# Patient Record
Sex: FEMALE | Race: WHITE | NOT HISPANIC OR LATINO | Employment: OTHER | URBAN - METROPOLITAN AREA
[De-identification: names, ages, dates, MRNs, and addresses within clinical notes are randomized per-mention and may not be internally consistent; named-entity substitution may affect disease eponyms.]

---

## 2017-04-06 ENCOUNTER — OFFICE VISIT (OUTPATIENT)
Dept: OBSTETRICS AND GYNECOLOGY | Age: 62
End: 2017-04-06

## 2017-04-06 VITALS
DIASTOLIC BLOOD PRESSURE: 80 MMHG | WEIGHT: 161 LBS | HEIGHT: 68 IN | BODY MASS INDEX: 24.4 KG/M2 | SYSTOLIC BLOOD PRESSURE: 118 MMHG

## 2017-04-06 DIAGNOSIS — R35.0 URINE FREQUENCY: ICD-10-CM

## 2017-04-06 DIAGNOSIS — N76.2 ACUTE VULVITIS: ICD-10-CM

## 2017-04-06 DIAGNOSIS — Z11.51 SCREENING FOR HUMAN PAPILLOMAVIRUS: ICD-10-CM

## 2017-04-06 DIAGNOSIS — Z01.419 WELL WOMAN EXAM WITH ROUTINE GYNECOLOGICAL EXAM: Primary | ICD-10-CM

## 2017-04-06 DIAGNOSIS — Z78.0 MENOPAUSE: ICD-10-CM

## 2017-04-06 DIAGNOSIS — Z79.890 POST-MENOPAUSE ON HRT (HORMONE REPLACEMENT THERAPY): ICD-10-CM

## 2017-04-06 PROBLEM — I10 HYPERTENSION: Status: ACTIVE | Noted: 2017-04-06

## 2017-04-06 PROBLEM — I42.9 CARDIOMYOPATHY (HCC): Status: ACTIVE | Noted: 2017-04-06

## 2017-04-06 LAB
BILIRUB BLD-MCNC: ABNORMAL MG/DL
GLUCOSE UR STRIP-MCNC: NEGATIVE MG/DL
KETONES UR QL: NEGATIVE
LEUKOCYTE EST, POC: NEGATIVE
NITRITE UR-MCNC: NEGATIVE MG/ML
PH UR: 5 [PH] (ref 5–8)
PROT UR STRIP-MCNC: NEGATIVE MG/DL
RBC # UR STRIP: NEGATIVE /UL
SP GR UR: 1.02 (ref 1–1.03)
UROBILINOGEN UR QL: NORMAL

## 2017-04-06 PROCEDURE — 81003 URINALYSIS AUTO W/O SCOPE: CPT | Performed by: OBSTETRICS & GYNECOLOGY

## 2017-04-06 PROCEDURE — 99396 PREV VISIT EST AGE 40-64: CPT | Performed by: OBSTETRICS & GYNECOLOGY

## 2017-04-06 RX ORDER — LIOTHYRONINE SODIUM 5 UG/1
TABLET ORAL
Refills: 1 | COMMUNITY
Start: 2017-03-15

## 2017-04-06 RX ORDER — ALBUTEROL SULFATE 90 UG/1
2 AEROSOL, METERED RESPIRATORY (INHALATION) EVERY 4 HOURS PRN
COMMUNITY
Start: 2016-10-07 | End: 2019-12-30

## 2017-04-06 RX ORDER — ATORVASTATIN CALCIUM 10 MG/1
TABLET, FILM COATED ORAL
Refills: 3 | COMMUNITY
Start: 2017-03-15 | End: 2019-01-11

## 2017-04-06 RX ORDER — HYDROCODONE BITARTRATE AND ACETAMINOPHEN 7.5; 325 MG/1; MG/1
1 TABLET ORAL EVERY 6 HOURS
COMMUNITY
End: 2017-04-06 | Stop reason: SDUPTHER

## 2017-04-06 RX ORDER — IRBESARTAN 300 MG/1
TABLET ORAL
Refills: 3 | COMMUNITY
Start: 2017-01-06 | End: 2017-04-06 | Stop reason: DRUGHIGH
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
## 2017-04-09 RX ORDER — ESTRADIOL 0.07 MG/D
1 FILM, EXTENDED RELEASE TRANSDERMAL 2 TIMES WEEKLY
Qty: 8 PATCH | Refills: 11 | Status: SHIPPED | OUTPATIENT
Start: 2017-04-10 | End: 2017-09-28 | Stop reason: DRUGHIGH

## 2017-04-10 NOTE — PROGRESS NOTES
Notify Allyn that her urinalysis is small amount of bilirubin, this normal should be negative.  With her alcohol abuse history we need to check her liver enzymes.  She should come in for a CBC and CMP.

## 2017-04-11 DIAGNOSIS — F10.10 ALCOHOL ABUSE: ICD-10-CM

## 2017-04-11 DIAGNOSIS — R17 ELEVATED BILIRUBIN: Primary | ICD-10-CM

## 2017-04-12 LAB
CYTOLOGIST CVX/VAG CYTO: NORMAL
CYTOLOGY CVX/VAG DOC THIN PREP: NORMAL
DX ICD CODE: NORMAL
HIV 1 & 2 AB SER-IMP: NORMAL
HPV I/H RISK 1 DNA CVX QL PROBE+SIG AMP: NEGATIVE
OTHER STN SPEC: NORMAL
PATH REPORT.FINAL DX SPEC: NORMAL
STAT OF ADQ CVX/VAG CYTO-IMP: NORMAL

## 2017-04-13 LAB
A VAGINAE DNA VAG QL NAA+PROBE: NORMAL SCORE
BVAB2 DNA VAG QL NAA+PROBE: NORMAL SCORE
C ALBICANS DNA VAG QL NAA+PROBE: NEGATIVE
C GLABRATA DNA VAG QL NAA+PROBE: NEGATIVE
MEGA1 DNA VAG QL NAA+PROBE: NORMAL SCORE
T VAGINALIS RRNA SPEC QL NAA+PROBE: NEGATIVE

## 2017-04-25 ENCOUNTER — TELEPHONE (OUTPATIENT)
Dept: OBSTETRICS AND GYNECOLOGY | Age: 62
End: 2017-04-25

## 2017-05-01 ENCOUNTER — RESULTS ENCOUNTER (OUTPATIENT)
Dept: OBSTETRICS AND GYNECOLOGY | Age: 62
End: 2017-05-01

## 2017-05-01 DIAGNOSIS — F10.10 ALCOHOL ABUSE: ICD-10-CM

## 2017-05-01 DIAGNOSIS — R17 ELEVATED BILIRUBIN: ICD-10-CM

## 2017-05-08 ENCOUNTER — TELEPHONE (OUTPATIENT)
Dept: OBSTETRICS AND GYNECOLOGY | Age: 62
End: 2017-05-08

## 2017-09-28 RX ORDER — ESTRADIOL 0.1 MG/D
FILM, EXTENDED RELEASE TRANSDERMAL
Qty: 8 PATCH | Refills: 10 | Status: SHIPPED | OUTPATIENT
Start: 2017-09-28 | End: 2019-01-11 | Stop reason: DRUGHIGH

## 2018-04-09 DIAGNOSIS — Z79.890 POST-MENOPAUSE ON HRT (HORMONE REPLACEMENT THERAPY): ICD-10-CM

## 2018-05-25 DIAGNOSIS — Z79.890 POST-MENOPAUSE ON HRT (HORMONE REPLACEMENT THERAPY): ICD-10-CM

## 2018-07-09 DIAGNOSIS — Z79.890 POST-MENOPAUSE ON HRT (HORMONE REPLACEMENT THERAPY): ICD-10-CM

## 2018-08-13 DIAGNOSIS — Z79.890 POST-MENOPAUSE ON HRT (HORMONE REPLACEMENT THERAPY): ICD-10-CM

## 2018-09-27 DIAGNOSIS — Z79.890 POST-MENOPAUSE ON HRT (HORMONE REPLACEMENT THERAPY): ICD-10-CM

## 2018-11-05 DIAGNOSIS — Z79.890 POST-MENOPAUSE ON HRT (HORMONE REPLACEMENT THERAPY): ICD-10-CM

## 2018-11-05 RX ORDER — ESTRADIOL 0.1 MG/D
FILM, EXTENDED RELEASE TRANSDERMAL
Qty: 8 PATCH | Refills: 0 | OUTPATIENT
Start: 2018-11-05

## 2018-11-05 NOTE — TELEPHONE ENCOUNTER
I will not refill these medications until patient is seen November 7.  She was due for an annual in April and has canceled multiple times.

## 2018-11-09 ENCOUNTER — TELEPHONE (OUTPATIENT)
Dept: OBSTETRICS AND GYNECOLOGY | Age: 63
End: 2018-11-09

## 2018-11-09 NOTE — TELEPHONE ENCOUNTER
I left Matilde message that she can go cold turkey off HRT.  She'll decide hot flashes and night sweats like crazy.  We can look to see how soon we can get her in at the end of the day.

## 2018-11-09 NOTE — TELEPHONE ENCOUNTER
Pt said she called her pharmacy to get a refill on her HRT and it was denied,  Can pt just go cold turkey until her next appt in a month? Or can we call in one more refill?    Pt has cancelled multiple appts (please see her appt history)

## 2018-12-11 ENCOUNTER — TELEPHONE (OUTPATIENT)
Dept: OBSTETRICS AND GYNECOLOGY | Age: 63
End: 2018-12-11

## 2019-01-11 ENCOUNTER — OFFICE VISIT (OUTPATIENT)
Dept: OBSTETRICS AND GYNECOLOGY | Age: 64
End: 2019-01-11

## 2019-01-11 VITALS
WEIGHT: 161 LBS | HEIGHT: 68 IN | BODY MASS INDEX: 24.4 KG/M2 | DIASTOLIC BLOOD PRESSURE: 72 MMHG | SYSTOLIC BLOOD PRESSURE: 112 MMHG

## 2019-01-11 DIAGNOSIS — N32.81 OAB (OVERACTIVE BLADDER): ICD-10-CM

## 2019-01-11 DIAGNOSIS — Z79.890 POST-MENOPAUSE ON HRT (HORMONE REPLACEMENT THERAPY): Primary | ICD-10-CM

## 2019-01-11 DIAGNOSIS — Z01.419 WELL WOMAN EXAM WITH ROUTINE GYNECOLOGICAL EXAM: ICD-10-CM

## 2019-01-11 DIAGNOSIS — N95.2 VAGINAL ATROPHY: ICD-10-CM

## 2019-01-11 DIAGNOSIS — Z78.0 MENOPAUSE: ICD-10-CM

## 2019-01-11 DIAGNOSIS — Z13.89 SCREENING FOR HEMATURIA OR PROTEINURIA: ICD-10-CM

## 2019-01-11 DIAGNOSIS — Z11.51 SCREENING FOR HUMAN PAPILLOMAVIRUS: ICD-10-CM

## 2019-01-11 LAB
BILIRUB BLD-MCNC: NEGATIVE MG/DL
CLARITY, POC: CLEAR
COLOR UR: YELLOW
GLUCOSE UR STRIP-MCNC: NEGATIVE MG/DL
KETONES UR QL: NEGATIVE
LEUKOCYTE EST, POC: NEGATIVE
NITRITE UR-MCNC: NEGATIVE MG/ML
PH UR: 5 [PH] (ref 5–8)
PROT UR STRIP-MCNC: NEGATIVE MG/DL
RBC # UR STRIP: NEGATIVE /UL
SP GR UR: 1.02 (ref 1–1.03)
UROBILINOGEN UR QL: NORMAL

## 2019-01-11 PROCEDURE — 81002 URINALYSIS NONAUTO W/O SCOPE: CPT | Performed by: OBSTETRICS & GYNECOLOGY

## 2019-01-11 PROCEDURE — 99396 PREV VISIT EST AGE 40-64: CPT | Performed by: OBSTETRICS & GYNECOLOGY

## 2019-01-11 RX ORDER — TRAMADOL HYDROCHLORIDE 50 MG/1
TABLET ORAL
COMMUNITY
End: 2019-01-11

## 2019-01-11 RX ORDER — OMEPRAZOLE 40 MG/1
CAPSULE, DELAYED RELEASE ORAL
Refills: 5 | COMMUNITY
Start: 2018-11-15 | End: 2019-01-11 | Stop reason: SDUPTHER

## 2019-01-11 RX ORDER — ESTRADIOL 0.07 MG/D
1 FILM, EXTENDED RELEASE TRANSDERMAL 2 TIMES WEEKLY
Qty: 8 PATCH | Refills: 12 | Status: SHIPPED | OUTPATIENT
Start: 2019-01-14 | End: 2019-02-11 | Stop reason: SDUPTHER

## 2019-01-11 RX ORDER — OMEPRAZOLE 40 MG/1
40 CAPSULE, DELAYED RELEASE ORAL DAILY
COMMUNITY

## 2019-01-11 NOTE — PROGRESS NOTES
Routine Annual Visit    2019    Patient: Victoria H Sturgeon          MR#:[id number]    Chief Complaint   Patient presents with   • Gynecologic Exam     AE today.  2017 pap neg, neg HPV.  PM with HRT.  MG due (family hx of breast cancer).  C-scope .        63 y.o. female  who presents for annual exam.     HISTORY OF PRESENT ILLNESS:    GYN Complaints: Incontinence, not SHANTA, mostly urgency, with occ leakage of a little urine. Will change underwear twice daily, but not damp, just changes. Much better with Myrbetriq, and even better when she doubled the dosage.     =Has noticed an odor recently, since off the estrogen. Will restart the patch.    Other Complaints: None.    =Has stopped drinking altogether. Seeing a cousellor. We talked about AA. Plans to go.    GYN HISTORY:  1.  Menstrual Hx:         HRT:  yes - Estradiol 0.1 mg twice weekly and progesterone 100 mg daily.         Current contraception: post menopausal status    2.  History of abnormal Pap smear: no.  Never had cervical dysplasia.      Pap smear Hx: 3525-6952, Normal Pap smear, Positive HR-HPV.  Did not test for type 16/18.    , Neg Pap, Neg HR-HPV.   , Neg Pap, Neg HR-HPV.   , Neg Pap, Neg HR-HPV.    NEW PAST MEDICAL HISTORY:    3.  New Medical Hx:  None.  Past Medical History:   Diagnosis Date   • Abnormal LFTs (liver function tests)     Improved with stopping ETOH.   • Abnormality on patient-activated cardiac event recorder     Underlying rhytm was sinus rhythm and sinus tachycardia.  Rates varied from 72 to 139 BPM.  Pt complains of fatigue and dizziness which occurred with sinus tachycardia at rate of 107.  Conclusion: There were no atrial or ventricular ectopic beats.  The patient's average heart rate was 93, which was elevated.  I have increased her Coreg to 25mg BID.    • Acute pain of left foot    • Aphthous ulcer of tongue    • Cardiomyopathy (CMS/HCC)     Viral: Follow up Dr Eden Rodriguez   • Cervical  radiculitis 2016    ACUTE  - DR. BECKWITH    • Chest discomfort    • Chondromalacia patellae, right knee 2017    injury 2016   • Colon cancer screening    • Contusion of right knee 2017    injury 2016   • Degeneration of cervical intervertebral disc    • Degenerative joint disease 2016    Dr. Beckwith - cervical spine    • Depression 2015   • Encounter for long-term (current) use of high-risk medication    • Encounter for screening colonoscopy    • Folate deficiency    • H/O cancer antigen 125 (CA-125) measurement    • H/O Doppler ultrasound    • History of cardiomyopathy    • History of Papanicolaou smear of cervix 2017    5029-3378, Normal Pap smear. ?? Year was Positive HR-HPV.  Did not test for type 16/18.   &  & , Neg Paps and Neg HR-HPV s.   • HPV test positive    • Hyperlipidemia    • Hypertension    • Hypothyroidism    • Internal hemorrhoid    • Macrocytic anemia    • Menopause    • Neck pain    • NICM (nonischemic cardiomyopathy) (CMS/HCC)    • Normal coronary arteries    • Osteopenia    • Pharyngeal abscess    • Sinusitis    • Spinal stenosis 2016    DR. BECKWITH    • Sprain of sacroiliac joint 2017    injury 2016   • Staphylococcal infection     Of throat   • Trigeminal neuralgia    • Urge incontinence    • Vitamin B 12 deficiency            4.  New Surgical Hx:  Colonoscopy in 2018: Normal, some diverticuli, no polyps.   Past Surgical History:   Procedure Laterality Date   • CATARACT EXTRACTION Bilateral 2016   •  SECTION      Baby Girl BERNA (Freshman @ [facility] )   • CHOLECYSTECTOMY  1963   • COLONOSCOPY  2014    Diverticulosis sigmoid, internal hemorrhoids   • HERNIA REPAIR      Age 19   • KNEE ARTHROSCOPY INCISION AND DRAINAGE OF KNEE Right  &     after fall   • SINUS SURGERY      X 2 in  and in            5.  New Family Medical Hx:  None.   Family History   Problem Relation Age of Onset   •  "Lupus Mother         Systemic Lupus Erythematosus (  in her sleep @ 73)   • Leukemia Father         CLL Had it for 14 years.   • Colon polyps Other         Family History   • Colon cancer Other         Family History   • Other Sister         breast lumpectomy   • No Known Problems Brother    • Scoliosis Daughter    • Heart disease Maternal Grandmother    • Anuerysm Maternal Grandmother 80   • No Known Problems Paternal Grandmother         90's   • Breast cancer Paternal Aunt 78   • No Known Problems Maternal Grandfather    • No Known Problems Paternal Grandfather         90's   • No Known Problems Sister    • Diabetes type II Brother         Diet controlled    • BRCA 1/2 Neg Hx    • Endometrial cancer Neg Hx    • Ovarian cancer Neg Hx          6.  SCREENINGS:  =Family history of breast cancer: yes - Pat Aunt @ 78.  Perform regular self breast exam: yes - Monthly mostly.  Breast self-examination technique  reviewed and the patient encouraged to perform self breast exams monthly.     =Family history of ovarian cancer: no  =Family history of uterine cancer: no  =Family History of colon cancer: no      Mammogram: up to date.  Colonoscopy: up to date.  DEXA: up to date.    7.  LIFESTYLE:  =Diet: Healthy.   = We discussed healthy lifestyle modifications.      =Exercise:  yes - Walking..   =I recommended 30 minutes of aerobic exercise 5 times a week.     =Calcium  no.  =Vitamin D:  no.   = We discussed calcium intake needs to help prevent osteoporosis:      Recommended 600 mg of calcium daily and 1000 IUs of vitamin D 3 daily.      Review of Systems   All other systems reviewed and are negative.      Objective     /72   Ht 172.7 cm (68\")   Wt 73 kg (161 lb)   Breastfeeding? No   BMI 24.48 kg/m²     PHYSICAL EXAM    Constitutional: Well-developed, well-nourished, thin  female, in no distress, alert and well oriented ×3.    Skin is warm, dry, without rash.       Neck appears normal, trachea in the " midline.  Thyroid exam normal. No carotid bruits bilaterally.         No cervical lymphadenopathy.    Lungs clear to auscultation.  Chest wall appears normal.    Heart with regular rhythm without murmur or gallop.    Breasts: Rec Regular SBE.        Right breast nontender, without dominant mass.  No nipple discharge.            No superficial skin changes.  No axillary adenopathy.        Left breast nontender, without dominant mass.  No nipple discharge.            No superficial skin changes.  No axillary adenopathy.      Abdomen is flat, soft and nontender.No palpable mass.           No hepatosplenomegaly.  Flanks are negative.          Bowel sounds normal.  No abdominal bruit.          No inguinal lymphadenopathy bilaterally.     Pelvic exam :  Vulva normal.           External genitalia normal.  BUS negative.             Introitus a little atrophic.  Vaginal mucosa atrophic            Cervix normal, Pap with HPV.  No cervical motion tenderness.          Uterus Midplane, normal size, normal shape, and position.          Adnexa are negative bilaterally.  No tenderness.  No palpable mass.          Rectovaginal exam Negative. .      Musc /Skel: Lower extremities without edema.     Neuro: Coordination is grossly normal.  Gait is normal.    Psych: Mood and affect is normal.  Behavior normal.  Thought content normal.            Judgment normal.       =All questions were answered.      Assessment/Plan   Matilde was seen today for gynecologic exam.    Diagnoses and all orders for this visit:    Post-menopause on HRT (hormone replacement therapy)  -     progesterone (PROMETRIUM) 100 MG capsule; Take 1 capsule by mouth Daily.  -     estradiol (MINIVELLE, VIVELLE-DOT) 0.075 MG/24HR patch; Place 1 patch on the skin as directed by provider 2 (Two) Times a Week.  -     PapIG, HPV, Rfx 16 / 18  -     Mammo Screening Bilateral With CAD; Future    Well woman exam with routine gynecological exam  -     PapIG, HPV, Rfx 16 /  18    Menopause  -     progesterone (PROMETRIUM) 100 MG capsule; Take 1 capsule by mouth Daily.  -     estradiol (MINIVELLE, VIVELLE-DOT) 0.075 MG/24HR patch; Place 1 patch on the skin as directed by provider 2 (Two) Times a Week.  -     Mammo Screening Bilateral With CAD; Future    Vaginal atrophy  -     estradiol (MINIVELLE, VIVELLE-DOT) 0.075 MG/24HR patch; Place 1 patch on the skin as directed by provider 2 (Two) Times a Week.    OAB (overactive bladder)    Screening for hematuria or proteinuria  -     POC Urinalysis Dipstick    Screening for human papillomavirus  -     PapIG, HPV, Rfx 16 / 18    Other orders  -     Mirabegron ER (MYRBETRIQ) 50 MG tablet sustained-release 24 hour 24 hr tablet; Take 50 mg by mouth Daily.        COMMENTS:Normal gyn exam. Has stopped drinking, excellent. Will restart her HRT with a decrease in her estradiol to 0.075 mg.    Fortino Garay MD  1/12/2019

## 2019-01-15 LAB
CYTOLOGIST CVX/VAG CYTO: NORMAL
CYTOLOGY CVX/VAG DOC THIN PREP: NORMAL
DX ICD CODE: NORMAL
HIV 1 & 2 AB SER-IMP: NORMAL
HPV I/H RISK 1 DNA CVX QL PROBE+SIG AMP: NEGATIVE
Lab: NORMAL
OTHER STN SPEC: NORMAL
PATH REPORT.FINAL DX SPEC: NORMAL
STAT OF ADQ CVX/VAG CYTO-IMP: NORMAL

## 2019-01-16 NOTE — PROGRESS NOTES
Notify that the Pap smear was negative and the high risk HPV testing was negative.  The Pap smear also showed changes associated with inflammation, THIS IS BECAUSE SHE HAS BEEN OFF THE ESTROGEN.  Also why she has noticed some discharge and odor.  This will resolve as she is back on the patch.

## 2019-02-11 DIAGNOSIS — Z79.890 POST-MENOPAUSE ON HRT (HORMONE REPLACEMENT THERAPY): ICD-10-CM

## 2019-02-11 DIAGNOSIS — N95.2 VAGINAL ATROPHY: ICD-10-CM

## 2019-02-11 DIAGNOSIS — Z78.0 MENOPAUSE: ICD-10-CM

## 2019-02-11 RX ORDER — ESTRADIOL 0.07 MG/D
FILM, EXTENDED RELEASE TRANSDERMAL
Qty: 8 PATCH | Refills: 0 | Status: SHIPPED | OUTPATIENT
Start: 2019-02-11 | End: 2019-05-06 | Stop reason: DRUGHIGH

## 2019-04-03 ENCOUNTER — TELEPHONE (OUTPATIENT)
Dept: OBSTETRICS AND GYNECOLOGY | Age: 64
End: 2019-04-03

## 2019-04-05 NOTE — TELEPHONE ENCOUNTER
Pt already had it done several months ago at [facility], do we need those records? She had a bone density as well.

## 2019-04-08 NOTE — TELEPHONE ENCOUNTER
I do not see these in care everywhere?  Would you please get mammogram and bone density if you don't see them in [facility]?

## 2019-05-06 ENCOUNTER — TELEPHONE (OUTPATIENT)
Dept: OBSTETRICS AND GYNECOLOGY | Age: 64
End: 2019-05-06

## 2019-05-06 DIAGNOSIS — Z79.890 POST-MENOPAUSE ON HRT (HORMONE REPLACEMENT THERAPY): Primary | ICD-10-CM

## 2019-05-06 RX ORDER — ESTRADIOL 0.1 MG/D
1 FILM, EXTENDED RELEASE TRANSDERMAL 2 TIMES WEEKLY
Qty: 24 PATCH | Refills: 4 | Status: SHIPPED | OUTPATIENT
Start: 2019-05-06 | End: 2019-07-05

## 2019-05-06 NOTE — TELEPHONE ENCOUNTER
Pt says she had discussed her estradiol patch dosage at last visit and she was going to try the lower dose. She tried the lower dose this weekend and had terrible night sweats. She ended up putting on 2 patches. She would like to increase the dosage to the next available amount. Pt requested to wait for Dr Garay to return to the office.

## 2019-05-07 NOTE — TELEPHONE ENCOUNTER
May 6, 2019.  8:19 PM  I left a message for Matilde that I E-scripted in her estradiol patch, 0.1 mg, twice weekly to her pharmacy.

## 2019-06-18 RX ORDER — CARVEDILOL 25 MG/1
25 TABLET ORAL 2 TIMES DAILY
Qty: 60 TABLET | Refills: 0 | Status: SHIPPED | OUTPATIENT
Start: 2019-06-18 | End: 2019-12-30 | Stop reason: DRUGHIGH

## 2019-07-03 DIAGNOSIS — N95.2 VAGINAL ATROPHY: ICD-10-CM

## 2019-07-03 DIAGNOSIS — Z79.890 POST-MENOPAUSE ON HRT (HORMONE REPLACEMENT THERAPY): ICD-10-CM

## 2019-07-03 DIAGNOSIS — Z78.0 MENOPAUSE: ICD-10-CM

## 2019-07-05 ENCOUNTER — TELEPHONE (OUTPATIENT)
Dept: OBSTETRICS AND GYNECOLOGY | Age: 64
End: 2019-07-05

## 2019-07-05 RX ORDER — ESTRADIOL 0.07 MG/D
FILM, EXTENDED RELEASE TRANSDERMAL
Qty: 8 PATCH | Refills: 6 | Status: SHIPPED | OUTPATIENT
Start: 2019-07-05 | End: 2019-07-19

## 2019-07-05 NOTE — TELEPHONE ENCOUNTER
Dr Phan pt, Tanisha transfer, pt seen that her Estradiol patch was called in at 0.075 MG/24HR Patch. Pt states the dosage was lowered and then increased back to 0.1MG please. Pt asked if the 0.1 MG could be sent to her [facility] on file. Please Advise

## 2019-07-05 NOTE — TELEPHONE ENCOUNTER
Per Liang at [facility], patient's  picked up the rx for the lower dose and he is not sure that insurance will cover another rx immediately.  He will run through and might be that patient has to pay out of pocket for it.  Called patient and left message to advise her to call pharmacy tomorrow to inquire as to what she can do for the new rx.  Apologized for the inconvenience.

## 2019-07-16 RX ORDER — FLUCONAZOLE 150 MG/1
150 TABLET ORAL DAILY
Qty: 2 TABLET | Refills: 0 | Status: SHIPPED | OUTPATIENT
Start: 2019-07-16 | End: 2019-07-19

## 2019-07-16 NOTE — TELEPHONE ENCOUNTER
Heather called experiencing vaginal itching  She started OTC yesterday monistat   Her symptoms began three days ago, vaginal itching and progressively worsened.  She said that the OTC,  two doses so far have offered no relief.  Would we please call in something stronger to her pharmacy on file?      PT # [phone]

## 2019-07-17 NOTE — TELEPHONE ENCOUNTER
Dr Phan pt has called in again because of all the itching and rawness. Pt states she took the 1st dose of Diflucan yesterday and the 2nd dose this morning. Pt said that it is still early for the meds to work, but wanted to see if there is another topical cream she may be able to use. Pt states this is the worst yeast infection she has ever had. Please Advise

## 2019-07-18 RX ORDER — NYSTATIN AND TRIAMCINOLONE ACETONIDE 100000; 1 [USP'U]/G; MG/G
OINTMENT TOPICAL 2 TIMES DAILY
Qty: 15 G | Refills: 0 | Status: SHIPPED | OUTPATIENT
Start: 2019-07-18 | End: 2021-01-10 | Stop reason: HOSPADM

## 2019-07-18 NOTE — TELEPHONE ENCOUNTER
May be using too many things- can actually get a chemical irritation if doing so. Can send in a different antifungal and steroid ointment to try but would recommend working in this afternoon if so miserable

## 2019-07-18 NOTE — TELEPHONE ENCOUNTER
Pt would like the medication called in.     __________      Sent in but if sx not better will need to come in    Zeynep Phan MD  7/18/2019  4:57 PM

## 2019-07-18 NOTE — TELEPHONE ENCOUNTER
Pt is calling again. Pt bought Cortizone feminine creme, Vagisil extreme along with monistat cream yesterday.

## 2019-07-19 ENCOUNTER — OFFICE VISIT (OUTPATIENT)
Dept: OBSTETRICS AND GYNECOLOGY | Age: 64
End: 2019-07-19

## 2019-07-19 VITALS
BODY MASS INDEX: 23.34 KG/M2 | SYSTOLIC BLOOD PRESSURE: 110 MMHG | HEIGHT: 68 IN | DIASTOLIC BLOOD PRESSURE: 70 MMHG | WEIGHT: 154 LBS

## 2019-07-19 DIAGNOSIS — B37.31 YEAST INFECTION INVOLVING THE VAGINA AND SURROUNDING AREA: Primary | ICD-10-CM

## 2019-07-19 PROCEDURE — 99214 OFFICE O/P EST MOD 30 MIN: CPT | Performed by: NURSE PRACTITIONER

## 2019-07-19 RX ORDER — ESTRADIOL 0.1 MG/D
FILM, EXTENDED RELEASE TRANSDERMAL
Refills: 6 | COMMUNITY
Start: 2019-07-17 | End: 2020-07-15

## 2019-07-19 RX ORDER — ZOLPIDEM TARTRATE 10 MG/1
TABLET ORAL
Refills: 3 | COMMUNITY
Start: 2019-06-24 | End: 2022-08-09

## 2019-07-19 RX ORDER — ROSUVASTATIN CALCIUM 5 MG/1
5 TABLET, COATED ORAL DAILY
Refills: 5 | COMMUNITY
Start: 2019-07-17

## 2019-07-19 RX ORDER — LIDOCAINE 50 MG/G
OINTMENT TOPICAL 2 TIMES DAILY
Qty: 30 G | Refills: 0 | Status: SHIPPED | OUTPATIENT
Start: 2019-07-19 | End: 2021-01-10 | Stop reason: HOSPADM

## 2019-07-19 NOTE — PROGRESS NOTES
"Subjective   Victoria H Sturgeon is a 64 y.o. female is being seen today for   Chief Complaint   Patient presents with   • Vaginitis     Pt c/o severe itching and has tried Diflucan (7/16/19) and several OTC medicines and nothing has helped. Pt says she is not itching much anymore but is very raw.    .    History of Present Illness     Patient here with vaginal itching that started this week  She noticed it while swimming this week, she has been in a wet bathing suit a lot   She used monistat 3 day Mon-Wed  She has had a decrease in itching but is still having some irritation   States she has scratched it a lot and caused raw areas  She has used monistat, vagisel, vaseline, neosporin, summers erick wipes and diflucan this week  She just took Diflucan 2 days ago and then again yesterday that was called in  She has not used the cream Dr Phan called in yet (Mycolog)  She is not sexually active      The following portions of the patient's history were reviewed and updated as appropriate: allergies, current medications, past family history, past medical history, past social history, past surgical history and problem list.    /70   Ht 172.7 cm (68\")   Wt 69.9 kg (154 lb)   BMI 23.42 kg/m²         Review of Systems   Constitutional: Negative.    HENT: Negative.    Eyes: Negative.    Respiratory: Negative.    Cardiovascular: Negative.    Gastrointestinal: Negative.    Endocrine: Negative.    Genitourinary: Positive for vaginal pain.        Vaginal itching   Musculoskeletal: Negative.    Skin: Negative.    Allergic/Immunologic: Negative.    Neurological: Negative.    Hematological: Negative.    Psychiatric/Behavioral: Negative.        Objective   Physical Exam   Constitutional: She is oriented to person, place, and time. She appears well-developed and well-nourished.   Genitourinary: There is rash on the right labia. There is no lesion on the right labia. There is rash on the left labia. There is no tenderness or " lesion on the left labia. No erythema in the vagina.   Genitourinary Comments: Patient requested no internal exam or speculum   External vulva red and swollen, some areas of breakdown on labia and in creases of legs, no lesions  Erythema extends to bikini line   Neurological: She is alert and oriented to person, place, and time.   Psychiatric: She has a normal mood and affect. Her behavior is normal.         Assessment/Plan   Matilde was seen today for vaginitis.    Diagnoses and all orders for this visit:    Yeast infection involving the vagina and surrounding area    Other orders  -     lidocaine (XYLOCAINE) 5 % ointment; Apply  topically to the appropriate area as directed 2 (Two) Times a Day.        Symptoms and exam consistent with external yeast.  She has not used the external cream that was called in to the pharmacy yesterday so I suggested she start that and use it twice a day.  Keep the area clean and dry and discontinue all other products and OTC treatments to avoid further irritation. Patient requests lidocaine cream as well to help avoid further damage by itching.  She will call back next week if her symptoms are not improved or worsen and may need to return for further evaluation.

## 2019-07-22 LAB
C ALBICANS DNA VAG QL NAA+PROBE: NEGATIVE
C GLABRATA DNA VAG QL NAA+PROBE: NEGATIVE
C KRUSEI DNA VAG QL NAA+PROBE: NEGATIVE
C LUSITANIAE DNA VAG QL NAA+PROBE: NEGATIVE
C PARAP DNA VAG QL NAA+PROBE: NEGATIVE
C TROPICLS DNA VAG QL NAA+PROBE: NEGATIVE

## 2019-07-23 ENCOUNTER — TELEPHONE (OUTPATIENT)
Dept: OBSTETRICS AND GYNECOLOGY | Age: 64
End: 2019-07-23

## 2019-07-23 NOTE — TELEPHONE ENCOUNTER
Sometimes swab can be negative for yeast if is a more external infection than internal vagina infection. The fact that the mycolog helped makes me think this is the case- but if sx return then would have her make appt w me, could be contact irritant vs chronic dermatologic condition, lots of different things

## 2019-07-23 NOTE — TELEPHONE ENCOUNTER
----- Message from SHERLYN Hamilton sent at 7/22/2019  4:59 PM EDT -----  Notify pt that her vaginal swab is neg for yeast

## 2019-12-30 ENCOUNTER — OFFICE VISIT (OUTPATIENT)
Dept: CARDIOLOGY | Facility: CLINIC | Age: 64
End: 2019-12-30

## 2019-12-30 VITALS
HEIGHT: 68 IN | BODY MASS INDEX: 23.64 KG/M2 | DIASTOLIC BLOOD PRESSURE: 70 MMHG | WEIGHT: 156 LBS | SYSTOLIC BLOOD PRESSURE: 110 MMHG | HEART RATE: 76 BPM

## 2019-12-30 DIAGNOSIS — I10 ESSENTIAL HYPERTENSION: ICD-10-CM

## 2019-12-30 DIAGNOSIS — I42.8 NICM (NONISCHEMIC CARDIOMYOPATHY) (HCC): Primary | ICD-10-CM

## 2019-12-30 DIAGNOSIS — E78.5 DYSLIPIDEMIA: ICD-10-CM

## 2019-12-30 PROCEDURE — 99213 OFFICE O/P EST LOW 20 MIN: CPT | Performed by: INTERNAL MEDICINE

## 2019-12-30 PROCEDURE — 93000 ELECTROCARDIOGRAM COMPLETE: CPT | Performed by: INTERNAL MEDICINE

## 2019-12-30 RX ORDER — CARVEDILOL 12.5 MG/1
1 TABLET ORAL 2 TIMES DAILY
COMMUNITY
Start: 2019-12-08 | End: 2022-06-28

## 2019-12-30 RX ORDER — IRBESARTAN 150 MG/1
75 TABLET ORAL NIGHTLY
Status: ON HOLD | COMMUNITY
Start: 2019-12-30 | End: 2021-01-08
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
## 2020-01-07 PROBLEM — R92.30 DENSE BREAST TISSUE ON MAMMOGRAM: Status: ACTIVE | Noted: 2020-01-07

## 2020-01-07 PROBLEM — R92.2 DENSE BREAST TISSUE ON MAMMOGRAM: Status: ACTIVE | Noted: 2020-01-07

## 2020-01-09 ENCOUNTER — HOSPITAL ENCOUNTER (OUTPATIENT)
Dept: CARDIOLOGY | Facility: HOSPITAL | Age: 65
Discharge: HOME | End: 2020-01-09
Admitting: INTERNAL MEDICINE

## 2020-01-09 ENCOUNTER — TELEPHONE (OUTPATIENT)
Dept: CARDIOLOGY | Facility: CLINIC | Age: 65
End: 2020-01-09

## 2020-01-09 ENCOUNTER — TELEPHONE (OUTPATIENT)
Dept: OBSTETRICS AND GYNECOLOGY | Age: 65
End: 2020-01-09

## 2020-01-09 VITALS
BODY MASS INDEX: 23.64 KG/M2 | HEART RATE: 78 BPM | HEIGHT: 68 IN | SYSTOLIC BLOOD PRESSURE: 128 MMHG | WEIGHT: 156 LBS | DIASTOLIC BLOOD PRESSURE: 81 MMHG

## 2020-01-09 DIAGNOSIS — I42.8 NICM (NONISCHEMIC CARDIOMYOPATHY) (HCC): ICD-10-CM

## 2020-01-09 PROCEDURE — 93306 TTE W/DOPPLER COMPLETE: CPT

## 2020-01-09 PROCEDURE — 93306 TTE W/DOPPLER COMPLETE: CPT | Performed by: INTERNAL MEDICINE

## 2020-01-09 NOTE — TELEPHONE ENCOUNTER
Her mammogram was normal/benign and recommended repeating next yr    Zeynep Phan MD  1/9/2020  4:30 PM

## 2020-01-09 NOTE — TELEPHONE ENCOUNTER
Pt had echo today and when she sat up she got light headed, she has medication questions regarding irbesartan (AVAPRO) 150 MG tablet and carvedilol (COREG) 12.5 MG tablet      Pt # [phone]

## 2020-01-11 LAB
AORTIC DIMENSIONLESS INDEX: 0.6 (DI)
BH CV ECHO MEAS - ACS: 1.9 CM
BH CV ECHO MEAS - AO MAX PG: 6.6 MMHG
BH CV ECHO MEAS - AO MEAN PG (FULL): 3 MMHG
BH CV ECHO MEAS - AO MEAN PG: 4 MMHG
BH CV ECHO MEAS - AO V2 MAX: 128 CM/SEC
BH CV ECHO MEAS - AO V2 MEAN: 92 CM/SEC
BH CV ECHO MEAS - AO V2 VTI: 27.4 CM
BH CV ECHO MEAS - AVA(I,A): 2 CM^2
BH CV ECHO MEAS - AVA(I,D): 2 CM^2
BH CV ECHO MEAS - BSA(HAYCOCK): 1.8 M^2
BH CV ECHO MEAS - BSA: 1.8 M^2
BH CV ECHO MEAS - BZI_BMI: 23.7 KILOGRAMS/M^2
BH CV ECHO MEAS - BZI_METRIC_HEIGHT: 172.7 CM
BH CV ECHO MEAS - BZI_METRIC_WEIGHT: 70.8 KG
BH CV ECHO MEAS - EDV(CUBED): 68.9 ML
BH CV ECHO MEAS - EDV(MOD-SP2): 66 ML
BH CV ECHO MEAS - EDV(MOD-SP4): 71 ML
BH CV ECHO MEAS - EDV(TEICH): 74.2 ML
BH CV ECHO MEAS - EF(CUBED): 77.3 %
BH CV ECHO MEAS - EF(MOD-BP): 63 %
BH CV ECHO MEAS - EF(MOD-SP2): 65.2 %
BH CV ECHO MEAS - EF(MOD-SP4): 60.6 %
BH CV ECHO MEAS - EF(TEICH): 69.9 %
BH CV ECHO MEAS - ESV(CUBED): 15.6 ML
BH CV ECHO MEAS - ESV(MOD-SP2): 23 ML
BH CV ECHO MEAS - ESV(MOD-SP4): 28 ML
BH CV ECHO MEAS - ESV(TEICH): 22.3 ML
BH CV ECHO MEAS - FS: 39 %
BH CV ECHO MEAS - IVS/LVPW: 1.3
BH CV ECHO MEAS - IVSD: 1 CM
BH CV ECHO MEAS - LAT PEAK E' VEL: 11.2 CM/SEC
BH CV ECHO MEAS - LV DIASTOLIC VOL/BSA (35-75): 38.6 ML/M^2
BH CV ECHO MEAS - LV MASS(C)D: 114.1 GRAMS
BH CV ECHO MEAS - LV MASS(C)DI: 62.1 GRAMS/M^2
BH CV ECHO MEAS - LV MAX PG: 2.5 MMHG
BH CV ECHO MEAS - LV MEAN PG: 1 MMHG
BH CV ECHO MEAS - LV SYSTOLIC VOL/BSA (12-30): 15.2 ML/M^2
BH CV ECHO MEAS - LV V1 MAX: 79 CM/SEC
BH CV ECHO MEAS - LV V1 MEAN: 52.3 CM/SEC
BH CV ECHO MEAS - LV V1 VTI: 17.8 CM
BH CV ECHO MEAS - LVIDD: 4.1 CM
BH CV ECHO MEAS - LVIDS: 2.5 CM
BH CV ECHO MEAS - LVLD AP2: 6.7 CM
BH CV ECHO MEAS - LVLD AP4: 7.2 CM
BH CV ECHO MEAS - LVLS AP2: 5.4 CM
BH CV ECHO MEAS - LVLS AP4: 5.8 CM
BH CV ECHO MEAS - LVOT AREA (M): 3.1 CM^2
BH CV ECHO MEAS - LVOT AREA: 3.1 CM^2
BH CV ECHO MEAS - LVOT DIAM: 2 CM
BH CV ECHO MEAS - LVPWD: 0.8 CM
BH CV ECHO MEAS - MED PEAK E' VEL: 10.7 CM/SEC
BH CV ECHO MEAS - MV A DUR: 0.11 SEC
BH CV ECHO MEAS - MV A MAX VEL: 60.2 CM/SEC
BH CV ECHO MEAS - MV DEC SLOPE: 419 CM/SEC^2
BH CV ECHO MEAS - MV DEC TIME: 153 SEC
BH CV ECHO MEAS - MV E MAX VEL: 75 CM/SEC
BH CV ECHO MEAS - MV E/A: 1.2
BH CV ECHO MEAS - MV MEAN PG: 1 MMHG
BH CV ECHO MEAS - MV P1/2T MAX VEL: 93.8 CM/SEC
BH CV ECHO MEAS - MV P1/2T: 65.6 MSEC
BH CV ECHO MEAS - MV V2 MEAN: 53.9 CM/SEC
BH CV ECHO MEAS - MV V2 VTI: 17.9 CM
BH CV ECHO MEAS - MVA P1/2T LCG: 2.3 CM^2
BH CV ECHO MEAS - MVA(P1/2T): 3.4 CM^2
BH CV ECHO MEAS - MVA(VTI): 3.1 CM^2
BH CV ECHO MEAS - PA ACC SLOPE: 1674 CM/SEC^2
BH CV ECHO MEAS - PA ACC TIME: 0.05 SEC
BH CV ECHO MEAS - PA MAX PG: 3 MMHG
BH CV ECHO MEAS - PA PR(ACCEL): 57 MMHG
BH CV ECHO MEAS - PA V2 MAX: 86 CM/SEC
BH CV ECHO MEAS - PULM A REVS DUR: 0.11 SEC
BH CV ECHO MEAS - PULM A REVS VEL: 34.2 CM/SEC
BH CV ECHO MEAS - PULM DIAS VEL: 31.6 CM/SEC
BH CV ECHO MEAS - PULM S/D: 1.4
BH CV ECHO MEAS - PULM SYS VEL: 44.6 CM/SEC
BH CV ECHO MEAS - QP/QS: 0.87
BH CV ECHO MEAS - RV MEAN PG: 2 MMHG
BH CV ECHO MEAS - RV V1 MEAN: 72.8 CM/SEC
BH CV ECHO MEAS - RV V1 VTI: 19.2 CM
BH CV ECHO MEAS - RVOT AREA: 2.5 CM^2
BH CV ECHO MEAS - RVOT DIAM: 1.8 CM
BH CV ECHO MEAS - SI(CUBED): 29 ML/M^2
BH CV ECHO MEAS - SI(LVOT): 30.4 ML/M^2
BH CV ECHO MEAS - SI(MOD-SP2): 23.4 ML/M^2
BH CV ECHO MEAS - SI(MOD-SP4): 23.4 ML/M^2
BH CV ECHO MEAS - SI(TEICH): 28.2 ML/M^2
BH CV ECHO MEAS - SV(CUBED): 53.3 ML
BH CV ECHO MEAS - SV(LVOT): 55.9 ML
BH CV ECHO MEAS - SV(MOD-SP2): 43 ML
BH CV ECHO MEAS - SV(MOD-SP4): 43 ML
BH CV ECHO MEAS - SV(RVOT): 48.9 ML
BH CV ECHO MEAS - SV(TEICH): 51.9 ML
BH CV ECHO MEAS - TAPSE (>1.6): 2.3 CM2
BH CV ECHO MEASUREMENTS AVERAGE E/E' RATIO: 6.85
BH CV XLRA - RV BASE: 3.5 CM
BH CV XLRA - TDI S': 11 CM/SEC
LEFT ATRIUM VOLUME INDEX: 19.6 ML/M2
MAXIMAL PREDICTED HEART RATE: 156 BPM
STRESS TARGET HR: 133 BPM

## 2020-03-17 DIAGNOSIS — Z79.890 POST-MENOPAUSE ON HRT (HORMONE REPLACEMENT THERAPY): ICD-10-CM

## 2020-03-17 DIAGNOSIS — Z78.0 MENOPAUSE: ICD-10-CM

## 2020-03-17 NOTE — TELEPHONE ENCOUNTER
Pt called requesting refills on two of her prescriptions.    Prometrium 100 MG  Myrbetriq 50 MG    Pharmacy verified.    [phone]

## 2020-07-06 RX ORDER — ESTRADIOL 0.1 MG/D
FILM, EXTENDED RELEASE TRANSDERMAL
Qty: 8 PATCH | Refills: 0 | OUTPATIENT
Start: 2020-07-06

## 2020-07-15 ENCOUNTER — OFFICE VISIT (OUTPATIENT)
Dept: OBSTETRICS AND GYNECOLOGY | Age: 65
End: 2020-07-15

## 2020-07-15 VITALS
HEIGHT: 68 IN | SYSTOLIC BLOOD PRESSURE: 142 MMHG | DIASTOLIC BLOOD PRESSURE: 88 MMHG | BODY MASS INDEX: 23.34 KG/M2 | WEIGHT: 154 LBS

## 2020-07-15 DIAGNOSIS — N90.4 LICHEN SCLEROSUS OF FEMALE GENITALIA: ICD-10-CM

## 2020-07-15 DIAGNOSIS — Z12.31 SCREENING MAMMOGRAM, ENCOUNTER FOR: ICD-10-CM

## 2020-07-15 DIAGNOSIS — Z12.4 SCREENING FOR MALIGNANT NEOPLASM OF CERVIX: Primary | ICD-10-CM

## 2020-07-15 PROCEDURE — G0101 CA SCREEN;PELVIC/BREAST EXAM: HCPCS | Performed by: OBSTETRICS & GYNECOLOGY

## 2020-07-15 RX ORDER — DULOXETIN HYDROCHLORIDE 20 MG/1
20 CAPSULE, DELAYED RELEASE ORAL DAILY
COMMUNITY
End: 2022-06-28 | Stop reason: DRUGHIGH

## 2020-07-15 RX ORDER — PREDNISONE 1 MG/1
1 TABLET ORAL DAILY
COMMUNITY
End: 2021-01-10 | Stop reason: HOSPADM
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
## 2020-07-17 LAB
CONV .: NORMAL
CYTOLOGIST CVX/VAG CYTO: NORMAL
CYTOLOGY CVX/VAG DOC CYTO: NORMAL
CYTOLOGY CVX/VAG DOC THIN PREP: NORMAL
DX ICD CODE: NORMAL
HIV 1 & 2 AB SER-IMP: NORMAL
OTHER STN SPEC: NORMAL
STAT OF ADQ CVX/VAG CYTO-IMP: NORMAL

## 2020-08-11 ENCOUNTER — TELEPHONE (OUTPATIENT)
Dept: OBSTETRICS AND GYNECOLOGY | Age: 65
End: 2020-08-11

## 2020-08-11 DIAGNOSIS — Z79.890 POST-MENOPAUSE ON HRT (HORMONE REPLACEMENT THERAPY): ICD-10-CM

## 2020-08-11 RX ORDER — ESTRADIOL 0.07 MG/D
1 FILM, EXTENDED RELEASE TRANSDERMAL 2 TIMES WEEKLY
Qty: 8 PATCH | Refills: 11 | Status: SHIPPED | OUTPATIENT
Start: 2020-08-13 | End: 2021-09-23 | Stop reason: SDUPTHER

## 2020-08-11 NOTE — TELEPHONE ENCOUNTER
Sent in. We had discussed risks of HRT at her last visit and she is aware    Zeynep Phan MD  8/11/2020  12:57

## 2020-08-11 NOTE — TELEPHONE ENCOUNTER
Ms. Sturgeon would like to be placed back on the hormone patch due to hot flashes.  Wants to know if you will put an order in for her.  Please advise.

## 2020-11-03 ENCOUNTER — TELEPHONE (OUTPATIENT)
Dept: CARDIOLOGY | Facility: CLINIC | Age: 65
End: 2020-11-03

## 2020-11-03 NOTE — TELEPHONE ENCOUNTER
CPA PT    PT needs cardiac cl for ankle SX for 11/6/20. DR Brown @[place]. PH: [phone] ext [phone] sx  [place] fax: [phone]  Gen anesthia.

## 2021-01-08 ENCOUNTER — HOSPITAL ENCOUNTER (INPATIENT)
Facility: HOSPITAL | Age: 66
LOS: 2 days | Discharge: HOME | End: 2021-01-10
Attending: EMERGENCY MEDICINE | Admitting: HOSPITALIST

## 2021-01-08 ENCOUNTER — APPOINTMENT (OUTPATIENT)
Dept: CT IMAGING | Facility: HOSPITAL | Age: 66
End: 2021-01-08

## 2021-01-08 DIAGNOSIS — E87.1 HYPONATREMIA: Primary | ICD-10-CM

## 2021-01-08 DIAGNOSIS — E87.6 HYPOKALEMIA: ICD-10-CM

## 2021-01-08 DIAGNOSIS — F10.929 ALCOHOLIC INTOXICATION WITH COMPLICATION (HCC): ICD-10-CM

## 2021-01-08 DIAGNOSIS — F11.10 OPIATE ABUSE, CONTINUOUS (HCC): ICD-10-CM

## 2021-01-08 PROBLEM — F10.10 ALCOHOL ABUSE: Status: ACTIVE | Noted: 2021-01-08

## 2021-01-08 PROBLEM — I42.8 NICM (NONISCHEMIC CARDIOMYOPATHY): Chronic | Status: ACTIVE | Noted: 2019-12-30

## 2021-01-08 PROBLEM — I10 HYPERTENSION: Chronic | Status: ACTIVE | Noted: 2017-04-06

## 2021-01-08 PROBLEM — E78.5 DYSLIPIDEMIA: Chronic | Status: ACTIVE | Noted: 2019-12-30

## 2021-01-08 PROBLEM — G93.41 METABOLIC ENCEPHALOPATHY: Status: ACTIVE | Noted: 2021-01-08

## 2021-01-08 PROBLEM — E86.0 DEHYDRATION: Status: ACTIVE | Noted: 2021-01-08

## 2021-01-08 LAB
ALBUMIN SERPL-MCNC: 4 G/DL (ref 3.5–5.2)
ALBUMIN/GLOB SERPL: 2.1 G/DL
ALP SERPL-CCNC: 243 U/L (ref 39–117)
ALT SERPL W P-5'-P-CCNC: 86 U/L (ref 1–33)
AMPHET+METHAMPHET UR QL: NEGATIVE
ANION GAP SERPL CALCULATED.3IONS-SCNC: 15.4 MMOL/L (ref 5–15)
APAP SERPL-MCNC: <5 MCG/ML (ref 0–30)
AST SERPL-CCNC: 183 U/L (ref 1–32)
BARBITURATES UR QL SCN: NEGATIVE
BASOPHILS # BLD AUTO: 0.02 10*3/MM3 (ref 0–0.2)
BASOPHILS NFR BLD AUTO: 0.3 % (ref 0–1.5)
BENZODIAZ UR QL SCN: NEGATIVE
BILIRUB SERPL-MCNC: 0.7 MG/DL (ref 0–1.2)
BUN SERPL-MCNC: 6 MG/DL (ref 8–23)
BUN/CREAT SERPL: 11.1 (ref 7–25)
CALCIUM SPEC-SCNC: 7.6 MG/DL (ref 8.6–10.5)
CANNABINOIDS SERPL QL: NEGATIVE
CHLORIDE SERPL-SCNC: 83 MMOL/L (ref 98–107)
CO2 SERPL-SCNC: 17.6 MMOL/L (ref 22–29)
COCAINE UR QL: NEGATIVE
CREAT SERPL-MCNC: 0.54 MG/DL (ref 0.57–1)
DEPRECATED RDW RBC AUTO: 41.9 FL (ref 37–54)
EOSINOPHIL # BLD AUTO: 0.1 10*3/MM3 (ref 0–0.4)
EOSINOPHIL NFR BLD AUTO: 1.6 % (ref 0.3–6.2)
ERYTHROCYTE [DISTWIDTH] IN BLOOD BY AUTOMATED COUNT: 12.1 % (ref 12.3–15.4)
ETHANOL BLD-MCNC: 143 MG/DL (ref 0–10)
ETHANOL UR QL: 0.14 %
GFR SERPL CREATININE-BSD FRML MDRD: 113 ML/MIN/1.73
GLOBULIN UR ELPH-MCNC: 1.9 GM/DL
GLUCOSE SERPL-MCNC: 85 MG/DL (ref 65–99)
HCT VFR BLD AUTO: 34.3 % (ref 34–46.6)
HGB BLD-MCNC: 12.2 G/DL (ref 12–15.9)
IMM GRANULOCYTES # BLD AUTO: 0.04 10*3/MM3 (ref 0–0.05)
IMM GRANULOCYTES NFR BLD AUTO: 0.6 % (ref 0–0.5)
LYMPHOCYTES # BLD AUTO: 1.68 10*3/MM3 (ref 0.7–3.1)
LYMPHOCYTES NFR BLD AUTO: 26.3 % (ref 19.6–45.3)
MAGNESIUM SERPL-MCNC: 1.7 MG/DL (ref 1.6–2.4)
MCH RBC QN AUTO: 33.4 PG (ref 26.6–33)
MCHC RBC AUTO-ENTMCNC: 35.6 G/DL (ref 31.5–35.7)
MCV RBC AUTO: 94 FL (ref 79–97)
METHADONE UR QL SCN: NEGATIVE
MONOCYTES # BLD AUTO: 0.71 10*3/MM3 (ref 0.1–0.9)
MONOCYTES NFR BLD AUTO: 11.1 % (ref 5–12)
NEUTROPHILS NFR BLD AUTO: 3.83 10*3/MM3 (ref 1.7–7)
NEUTROPHILS NFR BLD AUTO: 60.1 % (ref 42.7–76)
NRBC BLD AUTO-RTO: 0 /100 WBC (ref 0–0.2)
OPIATES UR QL: POSITIVE
OSMOLALITY SERPL: 267 MOSM/KG (ref 280–301)
OXYCODONE UR QL SCN: NEGATIVE
PLATELET # BLD AUTO: 281 10*3/MM3 (ref 140–450)
PMV BLD AUTO: 10.1 FL (ref 6–12)
POTASSIUM SERPL-SCNC: 3 MMOL/L (ref 3.5–5.2)
PROT SERPL-MCNC: 5.9 G/DL (ref 6–8.5)
RBC # BLD AUTO: 3.65 10*6/MM3 (ref 3.77–5.28)
SALICYLATES SERPL-MCNC: 1.8 MG/DL
SARS-COV-2 ORF1AB RESP QL NAA+PROBE: NOT DETECTED
SODIUM SERPL-SCNC: 116 MMOL/L (ref 136–145)
SODIUM SERPL-SCNC: 125 MMOL/L (ref 136–145)
SODIUM SERPL-SCNC: 126 MMOL/L (ref 136–145)
SODIUM SERPL-SCNC: 126 MMOL/L (ref 136–145)
T4 FREE SERPL-MCNC: 0.85 NG/DL (ref 0.93–1.7)
TSH SERPL DL<=0.05 MIU/L-ACNC: 0.52 UIU/ML (ref 0.27–4.2)
URATE SERPL-MCNC: 6.7 MG/DL (ref 2.4–5.7)
WBC # BLD AUTO: 6.38 10*3/MM3 (ref 3.4–10.8)

## 2021-01-08 PROCEDURE — 94799 UNLISTED PULMONARY SVC/PX: CPT

## 2021-01-08 PROCEDURE — 80307 DRUG TEST PRSMV CHEM ANLYZR: CPT | Performed by: EMERGENCY MEDICINE

## 2021-01-08 PROCEDURE — 84100 ASSAY OF PHOSPHORUS: CPT | Performed by: INTERNAL MEDICINE

## 2021-01-08 PROCEDURE — 82077 ASSAY SPEC XCP UR&BREATH IA: CPT | Performed by: EMERGENCY MEDICINE

## 2021-01-08 PROCEDURE — 84550 ASSAY OF BLOOD/URIC ACID: CPT | Performed by: NURSE PRACTITIONER

## 2021-01-08 PROCEDURE — 84295 ASSAY OF SERUM SODIUM: CPT | Performed by: NURSE PRACTITIONER

## 2021-01-08 PROCEDURE — 83735 ASSAY OF MAGNESIUM: CPT | Performed by: INTERNAL MEDICINE

## 2021-01-08 PROCEDURE — U0004 COV-19 TEST NON-CDC HGH THRU: HCPCS | Performed by: EMERGENCY MEDICINE

## 2021-01-08 PROCEDURE — 25010000002 THIAMINE PER 100 MG: Performed by: HOSPITALIST

## 2021-01-08 PROCEDURE — 80053 COMPREHEN METABOLIC PANEL: CPT | Performed by: EMERGENCY MEDICINE

## 2021-01-08 PROCEDURE — 84295 ASSAY OF SERUM SODIUM: CPT | Performed by: INTERNAL MEDICINE

## 2021-01-08 PROCEDURE — 25010000002 CALCIUM GLUCONATE PER 10 ML: Performed by: NURSE PRACTITIONER

## 2021-01-08 PROCEDURE — 94640 AIRWAY INHALATION TREATMENT: CPT

## 2021-01-08 PROCEDURE — 84443 ASSAY THYROID STIM HORMONE: CPT | Performed by: NURSE PRACTITIONER

## 2021-01-08 PROCEDURE — 80179 DRUG ASSAY SALICYLATE: CPT | Performed by: EMERGENCY MEDICINE

## 2021-01-08 PROCEDURE — 84439 ASSAY OF FREE THYROXINE: CPT | Performed by: HOSPITALIST

## 2021-01-08 PROCEDURE — 80143 DRUG ASSAY ACETAMINOPHEN: CPT | Performed by: EMERGENCY MEDICINE

## 2021-01-08 PROCEDURE — 82157 ASSAY OF ANDROSTENEDIONE: CPT | Performed by: NURSE PRACTITIONER

## 2021-01-08 PROCEDURE — 70450 CT HEAD/BRAIN W/O DYE: CPT

## 2021-01-08 PROCEDURE — 99285 EMERGENCY DEPT VISIT HI MDM: CPT

## 2021-01-08 PROCEDURE — 83930 ASSAY OF BLOOD OSMOLALITY: CPT | Performed by: NURSE PRACTITIONER

## 2021-01-08 PROCEDURE — 90791 PSYCH DIAGNOSTIC EVALUATION: CPT | Performed by: SOCIAL WORKER

## 2021-01-08 PROCEDURE — 85025 COMPLETE CBC W/AUTO DIFF WBC: CPT | Performed by: EMERGENCY MEDICINE

## 2021-01-08 RX ORDER — ACETAMINOPHEN 325 MG/1
650 TABLET ORAL EVERY 4 HOURS PRN
Status: DISCONTINUED | OUTPATIENT
Start: 2021-01-08 | End: 2021-01-10

## 2021-01-08 RX ORDER — MAGNESIUM SULFATE HEPTAHYDRATE 40 MG/ML
2 INJECTION, SOLUTION INTRAVENOUS AS NEEDED
Status: DISCONTINUED | OUTPATIENT
Start: 2021-01-08 | End: 2021-01-10

## 2021-01-08 RX ORDER — SODIUM CHLORIDE 0.9 % (FLUSH) 0.9 %
10 SYRINGE (ML) INJECTION AS NEEDED
Status: DISCONTINUED | OUTPATIENT
Start: 2021-01-08 | End: 2021-01-10 | Stop reason: HOSPADM

## 2021-01-08 RX ORDER — DIPHENOXYLATE HYDROCHLORIDE AND ATROPINE SULFATE 2.5; .025 MG/1; MG/1
1 TABLET ORAL DAILY
Status: DISCONTINUED | OUTPATIENT
Start: 2021-01-09 | End: 2021-01-08

## 2021-01-08 RX ORDER — LIOTHYRONINE SODIUM 5 UG/1
5 TABLET ORAL DAILY
Status: DISCONTINUED | OUTPATIENT
Start: 2021-01-08 | End: 2021-01-10 | Stop reason: HOSPADM

## 2021-01-08 RX ORDER — PANTOPRAZOLE SODIUM 40 MG/1
40 TABLET, DELAYED RELEASE ORAL EVERY MORNING
Status: DISCONTINUED | OUTPATIENT
Start: 2021-01-08 | End: 2021-01-10 | Stop reason: HOSPADM

## 2021-01-08 RX ORDER — LORAZEPAM 2 MG/ML
1 INJECTION INTRAMUSCULAR
Status: DISCONTINUED | OUTPATIENT
Start: 2021-01-08 | End: 2021-01-10

## 2021-01-08 RX ORDER — CARVEDILOL 12.5 MG/1
12.5 TABLET ORAL 2 TIMES DAILY
Status: DISCONTINUED | OUTPATIENT
Start: 2021-01-08 | End: 2021-01-10 | Stop reason: HOSPADM

## 2021-01-08 RX ORDER — BUDESONIDE AND FORMOTEROL FUMARATE DIHYDRATE 80; 4.5 UG/1; UG/1
2 AEROSOL RESPIRATORY (INHALATION)
Status: DISCONTINUED | OUTPATIENT
Start: 2021-01-08 | End: 2021-01-10 | Stop reason: HOSPADM

## 2021-01-08 RX ORDER — POTASSIUM CHLORIDE 750 MG/1
40 TABLET, FILM COATED, EXTENDED RELEASE ORAL AS NEEDED
Status: DISCONTINUED | OUTPATIENT
Start: 2021-01-08 | End: 2021-01-10

## 2021-01-08 RX ORDER — ONDANSETRON 2 MG/ML
4 INJECTION INTRAMUSCULAR; INTRAVENOUS EVERY 6 HOURS PRN
Status: DISCONTINUED | OUTPATIENT
Start: 2021-01-08 | End: 2021-01-10

## 2021-01-08 RX ORDER — FOLIC ACID 1 MG/1
1 TABLET ORAL DAILY
Status: DISCONTINUED | OUTPATIENT
Start: 2021-01-09 | End: 2021-01-08

## 2021-01-08 RX ORDER — LORAZEPAM 1 MG/1
1 TABLET ORAL
Status: DISCONTINUED | OUTPATIENT
Start: 2021-01-08 | End: 2021-01-10

## 2021-01-08 RX ORDER — POTASSIUM CHLORIDE 1.5 G/1.77G
40 POWDER, FOR SOLUTION ORAL AS NEEDED
Status: DISCONTINUED | OUTPATIENT
Start: 2021-01-08 | End: 2021-01-10

## 2021-01-08 RX ORDER — CHOLECALCIFEROL (VITAMIN D3) 125 MCG
1000 CAPSULE ORAL NIGHTLY
Status: DISCONTINUED | OUTPATIENT
Start: 2021-01-08 | End: 2021-01-10 | Stop reason: HOSPADM

## 2021-01-08 RX ORDER — POTASSIUM CHLORIDE 750 MG/1
40 TABLET, FILM COATED, EXTENDED RELEASE ORAL ONCE
Status: COMPLETED | OUTPATIENT
Start: 2021-01-08 | End: 2021-01-08

## 2021-01-08 RX ORDER — MAGNESIUM SULFATE HEPTAHYDRATE 40 MG/ML
4 INJECTION, SOLUTION INTRAVENOUS AS NEEDED
Status: DISCONTINUED | OUTPATIENT
Start: 2021-01-08 | End: 2021-01-10

## 2021-01-08 RX ORDER — LORAZEPAM 2 MG/ML
0.5 INJECTION INTRAMUSCULAR
Status: DISCONTINUED | OUTPATIENT
Start: 2021-01-08 | End: 2021-01-10

## 2021-01-08 RX ORDER — HYDROCODONE BITARTRATE AND ACETAMINOPHEN 7.5; 325 MG/1; MG/1
1 TABLET ORAL EVERY 4 HOURS PRN
COMMUNITY
End: 2022-06-28

## 2021-01-08 RX ORDER — LEVOFLOXACIN 500 MG/1
500 TABLET, FILM COATED ORAL DAILY
COMMUNITY
End: 2021-01-10 | Stop reason: HOSPADM

## 2021-01-08 RX ORDER — SODIUM CHLORIDE 450 MG/100ML
50 INJECTION, SOLUTION INTRAVENOUS CONTINUOUS
Status: DISCONTINUED | OUTPATIENT
Start: 2021-01-08 | End: 2021-01-08

## 2021-01-08 RX ORDER — HYDROCODONE BITARTRATE AND ACETAMINOPHEN 5; 325 MG/1; MG/1
1 TABLET ORAL EVERY 6 HOURS PRN
Status: DISCONTINUED | OUTPATIENT
Start: 2021-01-08 | End: 2021-01-09

## 2021-01-08 RX ORDER — ROSUVASTATIN CALCIUM 5 MG/1
5 TABLET, COATED ORAL NIGHTLY
Status: DISCONTINUED | OUTPATIENT
Start: 2021-01-08 | End: 2021-01-10 | Stop reason: HOSPADM

## 2021-01-08 RX ORDER — SODIUM CHLORIDE 9 MG/ML
75 INJECTION, SOLUTION INTRAVENOUS CONTINUOUS
Status: DISCONTINUED | OUTPATIENT
Start: 2021-01-08 | End: 2021-01-08

## 2021-01-08 RX ORDER — ESTRADIOL 0.07 MG/D
1 FILM, EXTENDED RELEASE TRANSDERMAL 2 TIMES WEEKLY
Status: DISCONTINUED | OUTPATIENT
Start: 2021-01-11 | End: 2021-01-10 | Stop reason: HOSPADM

## 2021-01-08 RX ORDER — DEXTROSE MONOHYDRATE 50 MG/ML
50 INJECTION, SOLUTION INTRAVENOUS CONTINUOUS
Status: DISCONTINUED | OUTPATIENT
Start: 2021-01-08 | End: 2021-01-09

## 2021-01-08 RX ORDER — ONDANSETRON 4 MG/1
4 TABLET, FILM COATED ORAL EVERY 6 HOURS PRN
Status: DISCONTINUED | OUTPATIENT
Start: 2021-01-08 | End: 2021-01-10

## 2021-01-08 RX ORDER — ZOLPIDEM TARTRATE 5 MG/1
5 TABLET ORAL NIGHTLY PRN
Status: DISCONTINUED | OUTPATIENT
Start: 2021-01-08 | End: 2021-01-10

## 2021-01-08 RX ORDER — POTASSIUM CHLORIDE 750 MG/1
40 TABLET, FILM COATED, EXTENDED RELEASE ORAL ONCE
Status: DISCONTINUED | OUTPATIENT
Start: 2021-01-08 | End: 2021-01-08

## 2021-01-08 RX ORDER — DIPHENOXYLATE HYDROCHLORIDE AND ATROPINE SULFATE 2.5; .025 MG/1; MG/1
1 TABLET ORAL DAILY
Status: COMPLETED | OUTPATIENT
Start: 2021-01-08 | End: 2021-01-10

## 2021-01-08 RX ORDER — LORAZEPAM 0.5 MG/1
0.5 TABLET ORAL
Status: DISCONTINUED | OUTPATIENT
Start: 2021-01-08 | End: 2021-01-10

## 2021-01-08 RX ORDER — FOLIC ACID 1 MG/1
1 TABLET ORAL DAILY
Status: COMPLETED | OUTPATIENT
Start: 2021-01-08 | End: 2021-01-10

## 2021-01-08 RX ADMIN — POTASSIUM CHLORIDE 40 MEQ: 750 TABLET, EXTENDED RELEASE ORAL at 12:45

## 2021-01-08 RX ADMIN — POTASSIUM CHLORIDE 40 MEQ: 750 TABLET, EXTENDED RELEASE ORAL at 22:01

## 2021-01-08 RX ADMIN — SODIUM CHLORIDE 75 ML/HR: 9 INJECTION, SOLUTION INTRAVENOUS at 02:51

## 2021-01-08 RX ADMIN — Medication 100 MG: at 16:22

## 2021-01-08 RX ADMIN — THIAMINE HYDROCHLORIDE 100 ML/HR: 100 INJECTION, SOLUTION INTRAMUSCULAR; INTRAVENOUS at 10:57

## 2021-01-08 RX ADMIN — HYDROCODONE BITARTRATE AND ACETAMINOPHEN 1 TABLET: 5; 325 TABLET ORAL at 22:48

## 2021-01-08 RX ADMIN — POTASSIUM CHLORIDE 40 MEQ: 750 TABLET, EXTENDED RELEASE ORAL at 02:50

## 2021-01-08 RX ADMIN — HYDROCODONE BITARTRATE AND ACETAMINOPHEN 1 TABLET: 5; 325 TABLET ORAL at 16:25

## 2021-01-08 RX ADMIN — SODIUM CHLORIDE 50 ML/HR: 4.5 INJECTION, SOLUTION INTRAVENOUS at 12:45

## 2021-01-08 RX ADMIN — CARVEDILOL 12.5 MG: 12.5 TABLET, FILM COATED ORAL at 16:23

## 2021-01-08 RX ADMIN — FOLIC ACID 1 MG: 1 TABLET ORAL at 16:22

## 2021-01-08 RX ADMIN — LIOTHYRONINE SODIUM 5 MCG: 5 TABLET ORAL at 16:23

## 2021-01-08 RX ADMIN — POTASSIUM CHLORIDE 40 MEQ: 750 TABLET, EXTENDED RELEASE ORAL at 17:40

## 2021-01-08 RX ADMIN — PROGESTERONE 100 MG: 100 CAPSULE ORAL at 16:22

## 2021-01-08 RX ADMIN — DEXTROSE MONOHYDRATE 50 ML/HR: 50 INJECTION, SOLUTION INTRAVENOUS at 20:19

## 2021-01-08 RX ADMIN — ZOLPIDEM TARTRATE 5 MG: 5 TABLET ORAL at 22:48

## 2021-01-08 RX ADMIN — ROSUVASTATIN CALCIUM 5 MG: 5 TABLET, FILM COATED ORAL at 20:19

## 2021-01-08 RX ADMIN — Medication 1000 MCG: at 20:19

## 2021-01-08 RX ADMIN — Medication 1 TABLET: at 16:22

## 2021-01-08 RX ADMIN — ACETAMINOPHEN 650 MG: 325 TABLET, FILM COATED ORAL at 13:38

## 2021-01-08 RX ADMIN — PANTOPRAZOLE SODIUM 40 MG: 40 TABLET, DELAYED RELEASE ORAL at 12:45

## 2021-01-08 RX ADMIN — BUDESONIDE AND FORMOTEROL FUMARATE DIHYDRATE 2 PUFF: 80; 4.5 AEROSOL RESPIRATORY (INHALATION) at 15:32

## 2021-01-08 RX ADMIN — CALCIUM GLUCONATE 2 G: 98 INJECTION, SOLUTION INTRAVENOUS at 14:06

## 2021-01-08 NOTE — ED NOTES
Report called.  Receiving RN informed this RN there is currently no bed in pt room.  Will call when bed arrives.     Delicia Villalpando, RN  01/08/21 5892

## 2021-01-08 NOTE — ED NOTES
"Pt to ED from home per [facility] with reports of drinking full bottle of tussin liquid, alcohol, and multiple pills from several bottles.      Pt has hx of alcoholism, drug use.  Pt's daughter found pt unconscious at bottom of stairs.   reports he thinks pt overdosed on hydrocodone as well.      Pt received 2mg narcan IV for pinpoint pupils.  Pt pupils returned to normal per EMS prior to arrival to ED.      Pt arrives to ED asleep, difficult to arouse, with slurred speech.      Pt denies taking medications, and states \"I only took some of my cough syrup because I was coughing\".  Pt asked about alcohol intake, and states \"I only drank two white claws\".  Pt denies SI/HI.    All triage performed with this RN wearing appropriate PPE.  Pt placed in mask upon arrival to ED.     Guera Garcia, RN  01/08/21 0108    "

## 2021-01-08 NOTE — H&P
History and physical    Primary care physician  Dr. PAINTING    Chief complaint   Altered mental status    History of present illness  65-year-old white female with very complex past medical history including hypertension hyperlipidemia nonischemic cardiomyopathy who also has history of alcohol and tobacco abuse quit for a while and then restarted few days ago presented to [facility] emergency room when she found at the bottom of the stairs.  Patient has been taking narcotics secondary to recent surgery and chronic pain syndrome.  Patient work-up in ER revealed severe hyponatremia and dehydration admit for management.  Patient also found to be alcohol intoxicated.  At the time of interview she is awake and alert asking for pain medication but no other complaints.    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   • Colon cancer screening     • Contusion of 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/20/2017   • Degeneration of cervical intervertebral disc     • Degenerative joint disease 11/16/2016   • Depression 07/22/2015   • Encounter for long-term (current) use of high-risk medication     • Encounter for screening colonoscopy     • Folate deficiency     • H/O cancer antigen 125 (CA-125) measurement     • H/O Doppler ultrasound     • History of alcohol abuse 12/2018   • History of bone density study 11/08/2018   • History of cardiomyopathy     • History of Papanicolaou smear of cervix 2017   • HPV test positive     • Hyperlipidemia     • Hypothyroidism     • Internal hemorrhoid     • Menopause     • Neck pain     • Normal coronary arteries     • Osteopenia 11/08/2018   • Pharyngeal abscess     • Sinusitis     • Spinal stenosis 11/16/2016   • Sprain of  sacroiliac joint 2017   • Staphylococcal infection     • Trigeminal neuralgia     • Urge incontinence     • Vitamin B 12 deficiency        PAST SURGICAL HISTORY              Procedure Laterality Date   • CATARACT EXTRACTION Bilateral 2016   •  SECTION        Baby Girl BERNA (Freshman [facility] )   • CHOLECYSTECTOMY   1963   • COLONOSCOPY   2014     Diverticulosis sigmoid, internal hemorrhoids   • HERNIA REPAIR         Age 19   • KNEE ARTHROSCOPY INCISION AND DRAINAGE OF KNEE Right  & 2019     after fall   • SINUS SURGERY         X 2 in  and in         FAMILY HISTORY           Problem Relation Age of Onset   • Lupus Mother           Systemic Lupus Erythematosus (  in her sleep @ 73)   • Leukemia Father           CLL Had it for 14 years.   • Colon polyps Other           Family History   • Colon cancer Other           Family History   • Other Sister           breast lumpectomy   • No Known Problems Brother     • Scoliosis Daughter     • Heart disease Maternal Grandmother     • Anuerysm Maternal Grandmother 80   • No Known Problems Paternal Grandmother           90's   • Breast cancer Paternal Aunt 78   • No Known Problems Maternal Grandfather     • No Known Problems Paternal Grandfather           90's   • No Known Problems Sister     • Diabetes type II Brother           Diet controlled    • BRCA 1/2 Neg Hx     • Endometrial cancer Neg Hx     • Ovarian cancer Neg Hx       SOCIAL HISTORY                 Socioeconomic History   • Marital status:        Spouse name: CRISTINO   • Number of children: 1   • Years of education: Not on file   • Highest education level: Not on file   Occupational History       Employer: RETIRED   Tobacco Use   • Smoking status: Never Smoker   • Smokeless tobacco: Never Used   Substance and Sexual Activity   • Alcohol use: Yes       Comment: no more than 2-3 drinks in a setting. hx of excessive alc use   • Drug use: No   • Sexual activity: Yes        Partners: Male       Comment: spouse = CRISTINO, with ED.        ALLERGIES  Cefdinir, Latex, and Amoxicillin-pot clavulanate  Home medications reviewed     REVIEW OF SYSTEMS  Constitutional: Negative for fever.   HENT: Negative for sore throat.    Eyes: Negative.    Respiratory: Negative for cough and shortness of breath.    Cardiovascular: Negative for chest pain.   Gastrointestinal: Negative for abdominal pain, diarrhea and vomiting.   Genitourinary: Negative for dysuria.   Musculoskeletal: Negative for neck pain. Chronic right lower extremity pain   Skin: Negative for rash.   Allergic/Immunologic: Negative.    Neurological: Negative for weakness, numbness and headaches.   Hematological: Negative.    Psychiatric/Behavioral: Negative.    All other systems reviewed and are negative.     PHYSICAL EXAM  Blood pressure 131/77, pulse 100, temperature 98.1 °F (36.7 °C), temperature source Oral, resp. rate 16, SpO2 96 %, not currently breastfeeding.    Constitutional: She is oriented to person, place, and time. No distress.   Head: Normocephalic and atraumatic.   Eyes: Pupils are equal, round, and reactive to light. EOM are normal.   Neck: Normal range of motion. Neck supple.   Cardiovascular: Normal rate, regular rhythm and normal heart sounds.   Pulmonary/Chest: Effort normal and breath sounds normal. No respiratory distress.   Abdominal: Soft. There is no abdominal tenderness. There is no rebound and no guarding.   Musculoskeletal: Normal range of motion.    No edema.   Neurological: She is alert and oriented to person, place, and time. She has normal sensation and normal strength.   Skin: Skin is warm and dry. No rash noted.   Psychiatric: Mood and affect normal.     LAB RESULTS   Lab Results (last 24 hours)     Procedure Component Value Units Date/Time    Sodium [[id number]]  (Abnormal) Collected: 01/08/21 1112    Specimen: Blood Updated: 01/08/21 1211     Sodium 125 mmol/L     Osmolality, Serum [[id number]]  (Abnormal)  Collected: 01/08/21 1112    Specimen: Blood Updated: 01/08/21 1202     Osmolality 267 mOsm/kg     Uric Acid [[id number]]  (Abnormal) Collected: 01/08/21 1112    Specimen: Blood Updated: 01/08/21 1155     Uric Acid 6.7 mg/dL     COVID PRE-OP / PRE-PROCEDURE SCREENING ORDER (NO ISOLATION) - Swab, Nasopharynx [[id number]]  (Normal) Collected: 01/08/21 0404    Specimen: Swab from Nasopharynx Updated: 01/08/21 1154    Narrative:      The following orders were created for panel order COVID PRE-OP / PRE-PROCEDURE SCREENING ORDER (NO ISOLATION) - Swab, Nasopharynx.  Procedure                               Abnormality         Status                     ---------                               -----------         ------                     COVID-19,APTIMA PANTHER,...[[id number]]  Normal              Final result                 Please view results for these tests on the individual orders.    COVID-19,APTIMA PANTHER,[facility] IN-HOUSE, NP/OP SWAB IN UTM/VTM/SALINE TRANSPORT MEDIA,24 HR TAT - Swab, Nasopharynx [[id number]]  (Normal) Collected: 01/08/21 0404    Specimen: Swab from Nasopharynx Updated: 01/08/21 1154     COVID19 Not Detected    Narrative:      Fact sheet for providers: https://www.fda.gov/media/758217/download     Fact sheet for patients: https://www.fda.gov/media/129210/download    Test performed by PCR.    TSH [[id number]] Collected: 01/08/21 1112    Specimen: Blood Updated: 01/08/21 1119    Androstenedione, ACTH Stimul. [[id number]] Collected: 01/08/21 1112    Specimen: Blood Updated: 01/08/21 1117    Urine Drug Screen - Urine, Catheter [[id number]]  (Abnormal) Collected: 01/08/21 0134    Specimen: Urine, Catheter Updated: 01/08/21 0235     Amphet/Methamphet, Screen Negative     Barbiturates Screen, Urine Negative     Benzodiazepine Screen, Urine Negative     Cocaine Screen, Urine Negative     Opiate Screen Positive     THC, Screen, Urine Negative     Methadone Screen, Urine Negative     Oxycodone Screen, Urine Negative     Narrative:      Negative Thresholds For Drugs Screened:     Amphetamines               500 ng/ml   Barbiturates               200 ng/ml   Benzodiazepines            100 ng/ml   Cocaine                    300 ng/ml   Methadone                  300 ng/ml   Opiates                    300 ng/ml   Oxycodone                  100 ng/ml   THC                        50 ng/ml    The Normal Value for all drugs tested is negative. This report includes final unconfirmed screening results to be used for medical treatment purposes only. Unconfirmed results must not be used for non-medical purposes such as employment or legal testing. Clinical consideration should be applied to any drug of abuse test, particulary when unconfirmed results are used.    Comprehensive Metabolic Panel [[id number]]  (Abnormal) Collected: 01/08/21 0134    Specimen: Blood Updated: 01/08/21 0233     Glucose 85 mg/dL      BUN 6 mg/dL      Creatinine 0.54 mg/dL      Sodium 116 mmol/L      Potassium 3.0 mmol/L      Chloride 83 mmol/L      CO2 17.6 mmol/L      Calcium 7.6 mg/dL      Total Protein 5.9 g/dL      Albumin 4.00 g/dL      ALT (SGPT) 86 U/L      AST (SGOT) 183 U/L      Alkaline Phosphatase 243 U/L      Total Bilirubin 0.7 mg/dL      eGFR Non African Amer 113 mL/min/1.73      Globulin 1.9 gm/dL      A/G Ratio 2.1 g/dL      BUN/Creatinine Ratio 11.1     Anion Gap 15.4 mmol/L     Narrative:      GFR Normal >60  Chronic Kidney Disease <60  Kidney Failure <15      Salicylate Level [[id number]]  (Normal) Collected: 01/08/21 0134    Specimen: Blood Updated: 01/08/21 0219     Salicylate 1.8 mg/dL     Narrative:      Therapeutic range for Salicylates:  3.0 - 10.0 mg/dL for antipyretic/analgesic conditions  15.0 - 30.0 mg/dL for anti-inflammatory conditions    Acetaminophen Level [[id number]]  (Normal) Collected: 01/08/21 0134    Specimen: Blood Updated: 01/08/21 0217     Acetaminophen <5.0 mcg/mL     Ethanol [[id number]]  (Abnormal) Collected: 01/08/21 0134     Specimen: Blood Updated: 01/08/21 0217     Ethanol 143 mg/dL      Ethanol % 0.143 %     CBC & Differential [[id number]]  (Abnormal) Collected: 01/08/21 0134    Specimen: Blood Updated: 01/08/21 0150    Narrative:      The following orders were created for panel order CBC & Differential.  Procedure                               Abnormality         Status                     ---------                               -----------         ------                     CBC Auto Differential[[id number]]        Abnormal            Final result                 Please view results for these tests on the individual orders.    CBC Auto Differential [[id number]]  (Abnormal) Collected: 01/08/21 0134    Specimen: Blood Updated: 01/08/21 0150     WBC 6.38 10*3/mm3      RBC 3.65 10*6/mm3      Hemoglobin 12.2 g/dL      Hematocrit 34.3 %      MCV 94.0 fL      MCH 33.4 pg      MCHC 35.6 g/dL      RDW 12.1 %      RDW-SD 41.9 fl      MPV 10.1 fL      Platelets 281 10*3/mm3      Neutrophil % 60.1 %      Lymphocyte % 26.3 %      Monocyte % 11.1 %      Eosinophil % 1.6 %      Basophil % 0.3 %      Immature Grans % 0.6 %      Neutrophils, Absolute 3.83 10*3/mm3      Lymphocytes, Absolute 1.68 10*3/mm3      Monocytes, Absolute 0.71 10*3/mm3      Eosinophils, Absolute 0.10 10*3/mm3      Basophils, Absolute 0.02 10*3/mm3      Immature Grans, Absolute 0.04 10*3/mm3      nRBC 0.0 /100 WBC         Imaging Results (Last 24 Hours)     Procedure Component Value Units Date/Time    CT Head Without Contrast [[id number]] Collected: 01/08/21 0231     Updated: 01/08/21 0231    Narrative:        Patient: STURGEON, VICTORIA  Time Out: 02:31  Exam(s): CT HEAD Without Contrast     EXAM:    CT Head Without Intravenous Contrast    CLINICAL HISTORY:     Delirium  Reason for exam: Delirium. STEREOTACTIC GUIDANCE PROTOCOL?-  >No. Will fiducial markers be needed for this procedure?->No.. Additional   notes: # OF IMAGES: CTDI 54.8 .8    TECHNIQUE:    Axial computed  tomography images of the head brain without intravenous   contrast.  CTDI is 54.8 mGy and DLP is 990.8 mGy-cm.  This CT exam was   performed according to the principle of ALARA (As Low As Reasonably   Achievable) by using one or more of the following dose reduction   techniques: automated exposure control, adjustment of the mA and or kV   according to patient size, and or use of iterative reconstruction   technique.    COMPARISON:      2007    FINDINGS:    Brain:  No hemorrhage or mass effect.    Ventricles:  No hydrocephalus.    Bones joints:  Unremarkable.    Soft tissues:  Unremarkable.    Sinuses:  Unremarkable.    Mastoid air cells:  Clear.      Impression:        No acute hemorrhage, hydrocephalus, or mass effect.        Electronically signed by Padmaja Yuen MD on 01-08-21 at 0231          Current Facility-Administered Medications:   •  acetaminophen (TYLENOL) tablet 650 mg, 650 mg, Oral, Q4H PRN, Daniel Tyler MD  •  budesonide-formoterol (SYMBICORT) 80-4.5 MCG/ACT inhaler 2 puff, 2 puff, Inhalation, BID - RT, Daniel Tyler MD  •  calcium gluconate 2 g in sodium chloride 0.9 % 100 mL IVPB, 2 g, Intravenous, Once, Luis Yee APRN  •  carvedilol (COREG) tablet 12.5 mg, 12.5 mg, Oral, BID, Daniel Tyler MD  •  [START ON 1/11/2021] estradiol (MINIVELLE, VIVELLE-DOT) 0.075 MG/24HR patch 1 patch, 1 patch, Transdermal, Once per day on Mon Thu, Daniel Tyler MD  •  [START ON 1/9/2021] thiamine (VITAMIN B-1) tablet 100 mg, 100 mg, Oral, Daily **AND** [START ON 1/9/2021] multivitamin (THERAGRAN) tablet 1 tablet, 1 tablet, Oral, Daily **AND** [START ON 1/9/2021] folic acid (FOLVITE) tablet 1 mg, 1 mg, Oral, Daily, Daniel Tyler MD  •  liothyronine (CYTOMEL) tablet 5 mcg, 5 mcg, Oral, Daily, Daniel Tyler MD  •  LORazepam (ATIVAN) tablet 0.5 mg, 0.5 mg, Oral, Q2H PRN **OR** LORazepam (ATIVAN) injection 0.5 mg, 0.5 mg, Intravenous, Q2H PRN **OR** LORazepam (ATIVAN) tablet 1 mg, 1 mg, Oral, Q1H PRN **OR**  LORazepam (ATIVAN) injection 1 mg, 1 mg, Intravenous, Q1H PRN **OR** LORazepam (ATIVAN) injection 1 mg, 1 mg, Intravenous, Q15 Min PRN **OR** LORazepam (ATIVAN) injection 1 mg, 1 mg, Intramuscular, Q15 Min PRN, Daniel Tyler MD  •  Magnesium Sulfate 2 gram Bolus, followed by 8 gram infusion (total Mg dose 10 grams)- Mg less than or equal to 1mg/dL, 2 g, Intravenous, PRN **OR** Magnesium Sulfate 2 gram / 50mL Infusion (GIVE X 3 BAGS TO EQUAL 6GM TOTAL DOSE) - Mg 1.1 - 1.5 mg/dl, 2 g, Intravenous, PRN **OR** Magnesium Sulfate 4 gram infusion- Mg 1.6-1.9 mg/dL, 4 g, Intravenous, PRN, Luis Yee, APRN  •  ondansetron (ZOFRAN) tablet 4 mg, 4 mg, Oral, Q6H PRN **OR** ondansetron (ZOFRAN) injection 4 mg, 4 mg, Intravenous, Q6H PRN, Daniel Tyler MD  •  pantoprazole (PROTONIX) EC tablet 40 mg, 40 mg, Oral, QAM, Daniel Tyler MD  •  potassium chloride (K-DUR,KLOR-CON) ER tablet 40 mEq, 40 mEq, Oral, PRN, Luis eYe, APRN  •  potassium chloride (KLOR-CON) packet 40 mEq, 40 mEq, Oral, PRN, Luis Yee, APRN  •  progesterone (PROMETRIUM) capsule 100 mg, 100 mg, Oral, Daily, Daniel Tyler MD  •  rosuvastatin (CRESTOR) tablet 5 mg, 5 mg, Oral, Nightly, Daniel Tyler MD  •  [COMPLETED] Insert peripheral IV, , , Once **AND** sodium chloride 0.9 % flush 10 mL, 10 mL, Intravenous, PRN, Ld Rodríguez MD  •  sodium chloride 0.9 % infusion, 75 mL/hr, Intravenous, Continuous, Luis Yee, JURGEN, Last Rate: 75 mL/hr at 01/08/21 1157, 75 mL/hr at 01/08/21 1157  •  vitamin B-12 (CYANOCOBALAMIN) tablet 1,000 mcg, 1,000 mcg, Oral, Nightly, Daniel Tyler MD  •  zolpidem (AMBIEN) tablet 5 mg, 5 mg, Oral, Nightly PRN, Daniel Tyler MD    ASSESSMENT  Severe hyponatremia  Alcohol intoxication  Chronic pain syndrome  Hypertension  Hyperlipidemia  Osteoarthritis  Gastroesophageal reflux disease    PLAN  Admit  IV fluids  Detox   [facility] protocol  Hold pain medications and Cymbalta  Nephrology consult  Adjust  home medications  Stress ulcer DVT prophylaxis  Supportive care  Patient is full code  Discussed with nursing staff  Follow closely further recommendation current hospital course    HUMBERTO JACOBSEN MD

## 2021-01-08 NOTE — ED NOTES
Christina Sturgeon (daughter) [phone] call with questions.     Jacklyn Whitfield RN  01/08/21 0053
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
## 2021-01-08 NOTE — ED NOTES
"Pt presents to ED via EMS w/cc unresponsive.  Per family, pt was found unconscious on stairs by daughter.  Pt rousable to verbal stimuli, disoriented to time, place and situation at this time.  Pt speech slurred, admits to \"2 [place] and some cough syrup\".  Pt denies any dizziness, headache, cp, soa, n/v/d, recent trauma or known exposure to illness.  Family states they have concerns r/t pt Hx ETOH and opiate abuse.     Delicia Villalpando, RN  01/08/21 4912    "

## 2021-01-08 NOTE — ED NOTES
Nursing report ED to floor  Matlide H Sturgeon  65 y.o.  female    HPI (triage note):   Chief Complaint   Patient presents with   • Alcohol Intoxication   • Drug Overdose       Admitting doctor:   Daniel Tyler MD    Admitting diagnosis:   The primary encounter diagnosis was Hyponatremia. Diagnoses of Hypokalemia, Alcoholic intoxication with complication (CMS/HCC), and Opiate abuse, continuous (CMS/[facility]) were also pertinent to this visit.    Code status:   Current Code Status     Date Active Code Status Order ID Comments User Context       Not on file    Advance Care Planning Activity          Allergies:   Cefdinir, Latex, and Amoxicillin-pot clavulanate    Weight:   There were no vitals filed for this visit.    Most recent vitals:   Vitals:    01/08/21 0230 01/08/21 0330 01/08/21 0400 01/08/21 0430   BP: 122/71 116/68 132/80 109/67   Patient Position:       Pulse: 75 79 82 82   Resp:       Temp:       TempSrc:       SpO2: 94% 93% 98% 95%       Active LDAs/IV Access:   Lines, Drains & Airways    Active LDAs     Name:   Placement date:   Placement time:   Site:   Days:    Peripheral IV 01/08/21 0053 Right Antecubital   01/08/21 0053    Antecubital   less than 1    Peripheral IV 01/08/21 0054 Left Antecubital   01/08/21 0054    Antecubital   less than 1                Labs (abnormal labs have a star):   Labs Reviewed   COMPREHENSIVE METABOLIC PANEL - Abnormal; Notable for the following components:       Result Value    BUN 6 (*)     Creatinine 0.54 (*)     Sodium 116 (*)     Potassium 3.0 (*)     Chloride 83 (*)     CO2 17.6 (*)     Calcium 7.6 (*)     Total Protein 5.9 (*)     ALT (SGPT) 86 (*)     AST (SGOT) 183 (*)     Alkaline Phosphatase 243 (*)     Anion Gap 15.4 (*)     All other components within normal limits    Narrative:     GFR Normal >60  Chronic Kidney Disease <60  Kidney Failure <15     ETHANOL - Abnormal; Notable for the following components:    Ethanol 143 (*)     All other components within  normal limits   URINE DRUG SCREEN - Abnormal; Notable for the following components:    Opiate Screen Positive (*)     All other components within normal limits    Narrative:     Negative Thresholds For Drugs Screened:     Amphetamines               500 ng/ml   Barbiturates               200 ng/ml   Benzodiazepines            100 ng/ml   Cocaine                    300 ng/ml   Methadone                  300 ng/ml   Opiates                    300 ng/ml   Oxycodone                  100 ng/ml   THC                        50 ng/ml    The Normal Value for all drugs tested is negative. This report includes final unconfirmed screening results to be used for medical treatment purposes only. Unconfirmed results must not be used for non-medical purposes such as employment or legal testing. Clinical consideration should be applied to any drug of abuse test, particulary when unconfirmed results are used.   CBC WITH AUTO DIFFERENTIAL - Abnormal; Notable for the following components:    RBC 3.65 (*)     MCH 33.4 (*)     RDW 12.1 (*)     Immature Grans % 0.6 (*)     All other components within normal limits   ACETAMINOPHEN LEVEL - Normal   SALICYLATE LEVEL - Normal    Narrative:     Therapeutic range for Salicylates:  3.0 - 10.0 mg/dL for antipyretic/analgesic conditions  15.0 - 30.0 mg/dL for anti-inflammatory conditions   COVID PRE-OP / PRE-PROCEDURE SCREENING ORDER (NO ISOLATION)    Narrative:     The following orders were created for panel order COVID PRE-OP / PRE-PROCEDURE SCREENING ORDER (NO ISOLATION) - Swab, Nasopharynx.  Procedure                               Abnormality         Status                     ---------                               -----------         ------                     COVID-19,APTIMA PANTHER,...[[id number]]                      In process                   Please view results for these tests on the individual orders.   COVID-19,APTIMA MADDI NICHOLS IN-HOUSE,NP/OP SWAB IN UTM/VTM/SALINE TRANSPORT  MEDIA,24 HR TAT   CBC AND DIFFERENTIAL    Narrative:     The following orders were created for panel order CBC & Differential.  Procedure                               Abnormality         Status                     ---------                               -----------         ------                     CBC Auto Differential[[id number]]        Abnormal            Final result                 Please view results for these tests on the individual orders.       EKG:   No orders to display       Meds given in ED:   Medications   sodium chloride 0.9 % flush 10 mL (has no administration in time range)   sodium chloride 0.9 % infusion (75 mL/hr Intravenous Currently Infusing 1/8/21 0459)   potassium chloride (K-DUR,KLOR-CON) ER tablet 40 mEq (40 mEq Oral Given 1/8/21 0250)       Imaging results:  Ct Head Without Contrast    Result Date: 1/8/2021    No acute hemorrhage, hydrocephalus, or mass effect. Electronically signed by Padmaja Yuen MD on 01-08-21 at 0231      Ambulatory status:   - with assist    Social issues:   Social History     Socioeconomic History   • Marital status:      Spouse name: CRISTINO   • Number of children: 1   • Years of education: Not on file   • Highest education level: Not on file   Occupational History     Employer: RETIRED   Tobacco Use   • Smoking status: Never Smoker   • Smokeless tobacco: Never Used   Substance and Sexual Activity   • Alcohol use: Yes     Comment: no more than 2-3 drinks in a setting. hx of excessive alc use   • Drug use: No   • Sexual activity: Yes     Partners: Male     Comment: spouse = CRISTINO, with ED.    Nursing report ED to floor     Delicia Villalpando RN  01/08/21 8127

## 2021-01-08 NOTE — CONSULTS
Patient is a 65-year-old  mother of 1 biological child and 2 history of children.  Her adult daughter found her at the foot of the stairs passed out, EMS was called and patient was brought to the ED.  There was some positive response to Narcan in the ED.  Per chart daughter reported the patient has a history of alcoholism and drug use.  Thus Access center was consulted.  Her alcohol level at 134 this morning was 143.  She reported to the ED staff that she had only taken some cough syrup.  She repeated the same to this clinician.  She also said that she had drank approximately 2 white claws.  Pt was confronted that she had a 134 ETOH level in the ED. Pt states that she had not been consuming ETOH since surgery on her ankle.  She denies any hx of wishing she was dead or attempting suicide. She also denied SI/HI in ED. patient states that she had an accident in the last year or so and has had to have surgery as a stressor.  She also reports that her brother has been in a coma approximately a week and they are not sure if he will come out.  She denies a history of past blackouts from alcohol use, and denies a history of alcohol DTs or withdrawal.  He states she tried marijuana once in college.  She denies abusing her pain medications.  There is no RUBA on the chart at this time.  She denies sleep or appetite issues.    Patient states that she has seen a counselor in the past but denies other prior psychiatric treatment.  Then she states that when she started having menopausal issues she was eventually placed on Cymbalta.  He has no history of ANNABEL treatment.    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  She has a bachelor's degree.  Her daughter is staying with patient and her  at this time due to opiate issues out in [place] where the daughter lives.    Patient was alert and oriented x4.  She was able to provide the current  president 3 most recent past presidents.  She was able to provide the correct floor that she is on in the hospital.  She denies any SI or HI.  She denies a history of either.  She does not feel that she has an alcoholic or drug abuse problem.  She denied abusing her pain medications.  There are concerns as patient is actually minimizing her use of alcohol as well as substances.  [facility] will follow patient.  [facility] Center will provide appropriate resources as patient's admission progresses.

## 2021-01-08 NOTE — ED NOTES
Tommy Sturgeon (stepson)  [phone]    Deanna (daughter)  [phone]    Geovanna (DIL)  [phone]     Delicia Villalpando, RN  01/08/21 0255

## 2021-01-09 LAB
25(OH)D3 SERPL-MCNC: 16.9 NG/ML (ref 30–100)
ALBUMIN SERPL-MCNC: 3.9 G/DL (ref 3.5–5.2)
ALBUMIN/GLOB SERPL: 1.9 G/DL
ALP SERPL-CCNC: 190 U/L (ref 39–117)
ALT SERPL W P-5'-P-CCNC: 45 U/L (ref 1–33)
ANION GAP SERPL CALCULATED.3IONS-SCNC: 6.9 MMOL/L (ref 5–15)
AST SERPL-CCNC: 43 U/L (ref 1–32)
BASOPHILS # BLD AUTO: 0.02 10*3/MM3 (ref 0–0.2)
BASOPHILS NFR BLD AUTO: 0.3 % (ref 0–1.5)
BILIRUB SERPL-MCNC: 0.6 MG/DL (ref 0–1.2)
BUN SERPL-MCNC: 4 MG/DL (ref 8–23)
BUN/CREAT SERPL: 7.1 (ref 7–25)
CALCIUM SPEC-SCNC: 9.1 MG/DL (ref 8.6–10.5)
CHLORIDE SERPL-SCNC: 100 MMOL/L (ref 98–107)
CHLORIDE UR-SCNC: 108 MMOL/L
CHOLEST SERPL-MCNC: 142 MG/DL (ref 0–200)
CO2 SERPL-SCNC: 22.1 MMOL/L (ref 22–29)
CORTIS SERPL-MCNC: 8.29 MCG/DL
CREAT SERPL-MCNC: 0.56 MG/DL (ref 0.57–1)
CREAT UR-MCNC: 43.8 MG/DL
DEPRECATED RDW RBC AUTO: 42.6 FL (ref 37–54)
EOSINOPHIL # BLD AUTO: 0.08 10*3/MM3 (ref 0–0.4)
EOSINOPHIL NFR BLD AUTO: 1.2 % (ref 0.3–6.2)
ERYTHROCYTE [DISTWIDTH] IN BLOOD BY AUTOMATED COUNT: 12.4 % (ref 12.3–15.4)
GFR SERPL CREATININE-BSD FRML MDRD: 109 ML/MIN/1.73
GLOBULIN UR ELPH-MCNC: 2.1 GM/DL
GLUCOSE SERPL-MCNC: 158 MG/DL (ref 65–99)
HBA1C MFR BLD: 4.7 % (ref 4.8–5.6)
HCT VFR BLD AUTO: 35.3 % (ref 34–46.6)
HDLC SERPL-MCNC: 71 MG/DL (ref 40–60)
HGB BLD-MCNC: 12.1 G/DL (ref 12–15.9)
IMM GRANULOCYTES # BLD AUTO: 0.02 10*3/MM3 (ref 0–0.05)
IMM GRANULOCYTES NFR BLD AUTO: 0.3 % (ref 0–0.5)
LDLC SERPL CALC-MCNC: 52 MG/DL (ref 0–100)
LDLC/HDLC SERPL: 0.7 {RATIO}
LYMPHOCYTES # BLD AUTO: 1.9 10*3/MM3 (ref 0.7–3.1)
LYMPHOCYTES NFR BLD AUTO: 29 % (ref 19.6–45.3)
MAGNESIUM SERPL-MCNC: 1.7 MG/DL (ref 1.6–2.4)
MCH RBC QN AUTO: 32.8 PG (ref 26.6–33)
MCHC RBC AUTO-ENTMCNC: 34.3 G/DL (ref 31.5–35.7)
MCV RBC AUTO: 95.7 FL (ref 79–97)
MONOCYTES # BLD AUTO: 1.1 10*3/MM3 (ref 0.1–0.9)
MONOCYTES NFR BLD AUTO: 16.8 % (ref 5–12)
NEUTROPHILS NFR BLD AUTO: 3.44 10*3/MM3 (ref 1.7–7)
NEUTROPHILS NFR BLD AUTO: 52.4 % (ref 42.7–76)
NRBC BLD AUTO-RTO: 0 /100 WBC (ref 0–0.2)
NT-PROBNP SERPL-MCNC: 152.9 PG/ML (ref 0–900)
OSMOLALITY UR: 309 MOSM/KG
PHOSPHATE SERPL-MCNC: 2 MG/DL (ref 2.5–4.5)
PLATELET # BLD AUTO: 303 10*3/MM3 (ref 140–450)
PMV BLD AUTO: 10.5 FL (ref 6–12)
POTASSIUM SERPL-SCNC: 4.6 MMOL/L (ref 3.5–5.2)
PROT SERPL-MCNC: 6 G/DL (ref 6–8.5)
RBC # BLD AUTO: 3.69 10*6/MM3 (ref 3.77–5.28)
SODIUM SERPL-SCNC: 109 MMOL/L (ref 136–145)
SODIUM SERPL-SCNC: 127 MMOL/L (ref 136–145)
SODIUM SERPL-SCNC: 128 MMOL/L (ref 136–145)
SODIUM SERPL-SCNC: 129 MMOL/L (ref 136–145)
SODIUM SERPL-SCNC: 129 MMOL/L (ref 136–145)
SODIUM SERPL-SCNC: 130 MMOL/L (ref 136–145)
SODIUM SERPL-SCNC: 132 MMOL/L (ref 136–145)
SODIUM UR-SCNC: 83 MMOL/L
TRIGL SERPL-MCNC: 106 MG/DL (ref 0–150)
TSH SERPL DL<=0.05 MIU/L-ACNC: 1.99 UIU/ML (ref 0.27–4.2)
VLDLC SERPL-MCNC: 19 MG/DL (ref 5–40)
WBC # BLD AUTO: 6.56 10*3/MM3 (ref 3.4–10.8)

## 2021-01-09 PROCEDURE — 80061 LIPID PANEL: CPT | Performed by: HOSPITALIST

## 2021-01-09 PROCEDURE — 85025 COMPLETE CBC W/AUTO DIFF WBC: CPT | Performed by: HOSPITALIST

## 2021-01-09 PROCEDURE — 84295 ASSAY OF SERUM SODIUM: CPT | Performed by: INTERNAL MEDICINE

## 2021-01-09 PROCEDURE — 83735 ASSAY OF MAGNESIUM: CPT | Performed by: INTERNAL MEDICINE

## 2021-01-09 PROCEDURE — 84443 ASSAY THYROID STIM HORMONE: CPT | Performed by: HOSPITALIST

## 2021-01-09 PROCEDURE — 84300 ASSAY OF URINE SODIUM: CPT | Performed by: NURSE PRACTITIONER

## 2021-01-09 PROCEDURE — 25010000002 DESMOPRESSIN PER 1 MCG: Performed by: INTERNAL MEDICINE

## 2021-01-09 PROCEDURE — 94799 UNLISTED PULMONARY SVC/PX: CPT

## 2021-01-09 PROCEDURE — 82533 TOTAL CORTISOL: CPT | Performed by: NURSE PRACTITIONER

## 2021-01-09 PROCEDURE — 82570 ASSAY OF URINE CREATININE: CPT | Performed by: NURSE PRACTITIONER

## 2021-01-09 PROCEDURE — 82436 ASSAY OF URINE CHLORIDE: CPT | Performed by: NURSE PRACTITIONER

## 2021-01-09 PROCEDURE — 80053 COMPREHEN METABOLIC PANEL: CPT | Performed by: HOSPITALIST

## 2021-01-09 PROCEDURE — 83880 ASSAY OF NATRIURETIC PEPTIDE: CPT | Performed by: HOSPITALIST

## 2021-01-09 PROCEDURE — 82306 VITAMIN D 25 HYDROXY: CPT | Performed by: INTERNAL MEDICINE

## 2021-01-09 PROCEDURE — 83036 HEMOGLOBIN GLYCOSYLATED A1C: CPT | Performed by: HOSPITALIST

## 2021-01-09 PROCEDURE — 83935 ASSAY OF URINE OSMOLALITY: CPT | Performed by: NURSE PRACTITIONER

## 2021-01-09 RX ORDER — MELATONIN
1000 DAILY
Status: DISCONTINUED | OUTPATIENT
Start: 2021-01-09 | End: 2021-01-10 | Stop reason: HOSPADM

## 2021-01-09 RX ORDER — DESMOPRESSIN ACETATE 4 UG/ML
1 INJECTION, SOLUTION INTRAVENOUS; SUBCUTANEOUS ONCE
Status: COMPLETED | OUTPATIENT
Start: 2021-01-09 | End: 2021-01-09

## 2021-01-09 RX ORDER — HYDROCODONE BITARTRATE AND ACETAMINOPHEN 5; 325 MG/1; MG/1
1 TABLET ORAL EVERY 4 HOURS PRN
Status: DISCONTINUED | OUTPATIENT
Start: 2021-01-09 | End: 2021-01-10

## 2021-01-09 RX ORDER — DULOXETIN HYDROCHLORIDE 20 MG/1
20 CAPSULE, DELAYED RELEASE ORAL DAILY
Status: DISCONTINUED | OUTPATIENT
Start: 2021-01-09 | End: 2021-01-10 | Stop reason: HOSPADM

## 2021-01-09 RX ADMIN — ZOLPIDEM TARTRATE 5 MG: 5 TABLET ORAL at 23:17

## 2021-01-09 RX ADMIN — HYDROCODONE BITARTRATE AND ACETAMINOPHEN 1 TABLET: 5; 325 TABLET ORAL at 23:17

## 2021-01-09 RX ADMIN — DULOXETINE HYDROCHLORIDE 20 MG: 20 CAPSULE, DELAYED RELEASE ORAL at 17:50

## 2021-01-09 RX ADMIN — Medication 1000 UNITS: at 17:50

## 2021-01-09 RX ADMIN — CARVEDILOL 12.5 MG: 12.5 TABLET, FILM COATED ORAL at 05:20

## 2021-01-09 RX ADMIN — Medication 100 MG: at 09:12

## 2021-01-09 RX ADMIN — FOLIC ACID 1 MG: 1 TABLET ORAL at 09:12

## 2021-01-09 RX ADMIN — CARVEDILOL 12.5 MG: 12.5 TABLET, FILM COATED ORAL at 20:18

## 2021-01-09 RX ADMIN — DESMOPRESSIN ACETATE 1 MCG: 4 SOLUTION INTRAVENOUS at 17:50

## 2021-01-09 RX ADMIN — LIOTHYRONINE SODIUM 5 MCG: 5 TABLET ORAL at 09:12

## 2021-01-09 RX ADMIN — Medication 1000 MCG: at 20:18

## 2021-01-09 RX ADMIN — PROGESTERONE 100 MG: 100 CAPSULE ORAL at 09:12

## 2021-01-09 RX ADMIN — HYDROCODONE BITARTRATE AND ACETAMINOPHEN 1 TABLET: 5; 325 TABLET ORAL at 18:41

## 2021-01-09 RX ADMIN — HYDROCODONE BITARTRATE AND ACETAMINOPHEN 1 TABLET: 5; 325 TABLET ORAL at 06:25

## 2021-01-09 RX ADMIN — HYDROCODONE BITARTRATE AND ACETAMINOPHEN 1 TABLET: 5; 325 TABLET ORAL at 12:49

## 2021-01-09 RX ADMIN — Medication 1 TABLET: at 09:12

## 2021-01-09 RX ADMIN — ROSUVASTATIN CALCIUM 5 MG: 5 TABLET, FILM COATED ORAL at 20:18

## 2021-01-09 RX ADMIN — BUDESONIDE AND FORMOTEROL FUMARATE DIHYDRATE 2 PUFF: 80; 4.5 AEROSOL RESPIRATORY (INHALATION) at 09:58

## 2021-01-09 RX ADMIN — PANTOPRAZOLE SODIUM 40 MG: 40 TABLET, DELAYED RELEASE ORAL at 06:24

## 2021-01-09 RX ADMIN — BUDESONIDE AND FORMOTEROL FUMARATE DIHYDRATE 2 PUFF: 80; 4.5 AEROSOL RESPIRATORY (INHALATION) at 00:13

## 2021-01-09 NOTE — NURSING NOTE
"[facility] follow-up.  Patient awake and alert, RIB, stated she is feeling \"better\".  Denies anxiety/depression.  Denies SI/HI.  Stated she just needs to recover physically is anxious to return home, is concerned about husbands health.  She denies alcohol withdrawal symptoms, CIWA scores are low.  She reports drinking recently to cope with stressors of life.  May be interested in receiving outpatient resources for therapists.      AC following.    "

## 2021-01-09 NOTE — PROGRESS NOTES
"Daily progress note    Chief complaint   Doing much better   No specific complaints except pain  Tolerating diet    History of present illness  65-year-old white female with very complex past medical history including hypertension hyperlipidemia nonischemic cardiomyopathy who also has history of alcohol and tobacco abuse quit for a while and then restarted few days ago presented to [facility] emergency room when she found at the bottom of the stairs.  Patient has been taking narcotics secondary to recent surgery and chronic pain syndrome.  Patient work-up in ER revealed severe hyponatremia and dehydration admit for management.  Patient also found to be alcohol intoxicated.  At the time of interview she is awake and alert asking for pain medication but no other complaints.     REVIEW OF SYSTEMS  Constitutional: Negative for fever.   HENT: Negative for sore throat.    Eyes: Negative.    Respiratory: Negative for cough and shortness of breath.    Cardiovascular: Negative for chest pain.   Gastrointestinal: Negative for abdominal pain, diarrhea and vomiting.   Genitourinary: Negative for dysuria.   Musculoskeletal: Negative for neck pain. Chronic right lower extremity pain   Skin: Negative for rash.   Allergic/Immunologic: Negative.    Neurological: Negative for weakness, numbness and headaches.   Hematological: Negative.    Psychiatric/Behavioral: Negative.    All other systems reviewed and are negative.     PHYSICAL EXAM  Blood pressure 123/70, pulse 85, temperature 97.7 °F (36.5 °C), temperature source Oral, resp. rate 17, height 172.7 cm (67.99\"), weight 73.2 kg (161 lb 6 oz), SpO2 97 %, not currently breastfeeding.    Constitutional: She is oriented to person, place, and time. No distress.   Head: Normocephalic and atraumatic.   Eyes: Pupils are equal, round, and reactive to light. EOM are normal.   Neck: Normal range of motion. Neck supple.   Cardiovascular: Normal rate, regular rhythm and normal heart " sounds.   Pulmonary/Chest: Effort normal and breath sounds normal. No respiratory distress.   Abdominal: Soft. There is no abdominal tenderness. There is no rebound and no guarding.   Musculoskeletal: Normal range of motion.    No edema.   Neurological: She is alert and oriented to person, place, and time. She has normal sensation and normal strength.   Skin: Skin is warm and dry. No rash noted.   Psychiatric: Mood and affect normal.     LAB RESULTS   Lab Results (last 24 hours)     Procedure Component Value Units Date/Time    Sodium [[id number]]  (Abnormal) Collected: 01/09/21 1204    Specimen: Blood Updated: 01/09/21 1328     Sodium 132 mmol/L     Cortisol [[id number]] Collected: 01/09/21 0525    Specimen: Blood Updated: 01/09/21 0908     Cortisol 8.29 mcg/dL     Narrative:      Cortisol Reference Ranges:    Cortisol 6AM - 10AM Range: 6.02-18.40 mcg/dl  Cortisol 4PM - 8PM Range: 2.68-10.50 mcg/dl      Results may be falsely increased if patient taking Biotin.      Osmolality, Urine - [[id number]] Collected: 01/09/21 0806    Specimen: Urine Updated: 01/09/21 0851     Osmolality, Urine 309 mOsm/kg     Narrative:      Osmo Normal Reference Ranges:    Random:  mOsm/kg H2O, depending on fluid intake.  Random: >850 mOsm/kg H20, after 12 hour fluid restriction.    24 Hour: 300-900 mOsm/kg H2O.    Chloride, Urine, Random - [[id number]] Collected: 01/09/21 0806    Specimen: Urine Updated: 01/09/21 0850     Chloride, Urine 108 mmol/L     Narrative:      Reference intervals for random urine have not been established.  Clinical usage is dependent upon physician's interpretation in combination with other laboratory tests.       Creatinine, Urine, Random - [[id number]] Collected: 01/09/21 0806    Specimen: Urine Updated: 01/09/21 0850     Creatinine, Urine 43.8 mg/dL     Narrative:      Reference intervals for random urine have not been established.  Clinical usage is dependent upon physician's interpretation in combination  with other laboratory tests.       Sodium, Urine, Random - Urine, Random Void [[id number]] Collected: 01/09/21 0807    Specimen: Urine, Random Void Updated: 01/09/21 0839     Sodium, Urine 83 mmol/L     Narrative:      Reference intervals for random urine have not been established.  Clinical usage is dependent upon physician's interpretation in combination with other laboratory tests.       Sodium [[id number]]  (Abnormal) Collected: 01/09/21 0749    Specimen: Blood Updated: 01/09/21 0830     Sodium 129 mmol/L     Sodium [[id number]]  (Abnormal) Collected: 01/09/21 0525    Specimen: Blood Updated: 01/09/21 0819     Sodium 130 mmol/L     BNP [[id number]]  (Normal) Collected: 01/09/21 0525    Specimen: Blood Updated: 01/09/21 0708     proBNP 152.9 pg/mL     Narrative:      Among patients with dyspnea, NT-proBNP is highly sensitive for the detection of acute congestive heart failure. In addition NT-proBNP of <300 pg/ml effectively rules out acute congestive heart failure with 99% negative predictive value.    Results may be falsely decreased if patient taking Biotin.      TSH [[id number]]  (Normal) Collected: 01/09/21 0525    Specimen: Blood Updated: 01/09/21 0708     TSH 1.990 uIU/mL     Comprehensive Metabolic Panel [[id number]]  (Abnormal) Collected: 01/09/21 0525    Specimen: Blood Updated: 01/09/21 0702     Glucose 158 mg/dL      BUN 4 mg/dL      Creatinine 0.56 mg/dL      Sodium 129 mmol/L      Potassium 4.6 mmol/L      Chloride 100 mmol/L      CO2 22.1 mmol/L      Calcium 9.1 mg/dL      Total Protein 6.0 g/dL      Albumin 3.90 g/dL      ALT (SGPT) 45 U/L      AST (SGOT) 43 U/L      Alkaline Phosphatase 190 U/L      Total Bilirubin 0.6 mg/dL      eGFR Non African Amer 109 mL/min/1.73      Globulin 2.1 gm/dL      A/G Ratio 1.9 g/dL      BUN/Creatinine Ratio 7.1     Anion Gap 6.9 mmol/L     Narrative:      GFR Normal >60  Chronic Kidney Disease <60  Kidney Failure <15      Vitamin D 25 Hydroxy [[id number]]  (Abnormal)  Collected: 01/09/21 0525    Specimen: Blood Updated: 01/09/21 0657     25 Hydroxy, Vitamin D 16.9 ng/ml     Narrative:      Reference Range for Total Vitamin D 25(OH)     Deficiency <20.0 ng/mL   Insufficiency 21-29 ng/mL   Sufficiency  ng/mL  Toxicity >100 ng/ml    Results may be falsely increased if patient taking Biotin.      Lipid Panel [[id number]]  (Abnormal) Collected: 01/09/21 0525    Specimen: Blood Updated: 01/09/21 0654     Total Cholesterol 142 mg/dL      Triglycerides 106 mg/dL      HDL Cholesterol 71 mg/dL      LDL Cholesterol  52 mg/dL      VLDL Cholesterol 19 mg/dL      LDL/HDL Ratio 0.70    Narrative:      Cholesterol Reference Ranges  (U.S. Department of Health and Human Services ATP III Classifications)    Desirable          <200 mg/dL  Borderline High    200-239 mg/dL  High Risk          >240 mg/dL      Triglyceride Reference Ranges  (U.S. Department of Health and Human Services ATP III Classifications)    Normal           <150 mg/dL  Borderline High  150-199 mg/dL  High             200-499 mg/dL  Very High        >500 mg/dL    HDL Reference Ranges  (U.S. Department of Health and Human Services ATP III Classifcations)    Low     <40 mg/dl (major risk factor for CHD)  High    >60 mg/dl ('negative' risk factor for CHD)        LDL Reference Ranges  (U.S. Department of Health and Human Services ATP III Classifcations)    Optimal          <100 mg/dL  Near Optimal     100-129 mg/dL  Borderline High  130-159 mg/dL  High             160-189 mg/dL  Very High        >189 mg/dL    Magnesium [[id number]]  (Normal) Collected: 01/09/21 0525    Specimen: Blood Updated: 01/09/21 0654     Magnesium 1.7 mg/dL     Hemoglobin A1c [[id number]]  (Abnormal) Collected: 01/09/21 0525    Specimen: Blood Updated: 01/09/21 0633     Hemoglobin A1C 4.70 %     Narrative:      Hemoglobin A1C Ranges:    Increased Risk for Diabetes  5.7% to 6.4%  Diabetes                     >= 6.5%  Diabetic Goal                < 7.0%    CBC  & Differential [[id number]]  (Abnormal) Collected: 01/09/21 0525    Specimen: Blood Updated: 01/09/21 0625    Narrative:      The following orders were created for panel order CBC & Differential.  Procedure                               Abnormality         Status                     ---------                               -----------         ------                     CBC Auto Differential[[id number]]        Abnormal            Final result                 Please view results for these tests on the individual orders.    CBC Auto Differential [[id number]]  (Abnormal) Collected: 01/09/21 0525    Specimen: Blood Updated: 01/09/21 0625     WBC 6.56 10*3/mm3      RBC 3.69 10*6/mm3      Hemoglobin 12.1 g/dL      Hematocrit 35.3 %      MCV 95.7 fL      MCH 32.8 pg      MCHC 34.3 g/dL      RDW 12.4 %      RDW-SD 42.6 fl      MPV 10.5 fL      Platelets 303 10*3/mm3      Neutrophil % 52.4 %      Lymphocyte % 29.0 %      Monocyte % 16.8 %      Eosinophil % 1.2 %      Basophil % 0.3 %      Immature Grans % 0.3 %      Neutrophils, Absolute 3.44 10*3/mm3      Lymphocytes, Absolute 1.90 10*3/mm3      Monocytes, Absolute 1.10 10*3/mm3      Eosinophils, Absolute 0.08 10*3/mm3      Basophils, Absolute 0.02 10*3/mm3      Immature Grans, Absolute 0.02 10*3/mm3      nRBC 0.0 /100 WBC     Phosphorus [[id number]]  (Abnormal) Collected: 01/08/21 2003    Specimen: Blood Updated: 01/09/21 0346     Phosphorus 2.0 mg/dL     Sodium [[id number]]  (Abnormal) Collected: 01/09/21 0008    Specimen: Blood Updated: 01/09/21 0142     Sodium 128 mmol/L     Sodium [[id number]]  (Abnormal) Collected: 01/08/21 2003    Specimen: Blood Updated: 01/08/21 2315     Sodium 126 mmol/L     Sodium [[id number]]  (Abnormal) Collected: 01/08/21 1600    Specimen: Blood Updated: 01/08/21 1712     Sodium 126 mmol/L         Imaging Results (Last 24 Hours)     ** No results found for the last 24 hours. **          Current Facility-Administered Medications:   •  acetaminophen  (TYLENOL) tablet 650 mg, 650 mg, Oral, Q4H PRN, Daniel Tyler MD, 650 mg at 01/08/21 1338  •  budesonide-formoterol (SYMBICORT) 80-4.5 MCG/ACT inhaler 2 puff, 2 puff, Inhalation, BID - RT, Daniel Tyler MD, 2 puff at 01/09/21 0958  •  carvedilol (COREG) tablet 12.5 mg, 12.5 mg, Oral, BID, Daniel Tyler MD, Stopped at 01/09/21 0911  •  desmopressin (DDAVP) injection 1 mcg, 1 mcg, Intravenous, Once, Chris Brock MD  •  dextrose (D5W) 5 % infusion, 100 mL/hr, Intravenous, Continuous, Chris Brock MD, Last Rate: 100 mL/hr at 01/09/21 0911, 100 mL/hr at 01/09/21 0911  •  [START ON 1/11/2021] estradiol (MINIVELLE, VIVELLE-DOT) 0.075 MG/24HR patch 1 patch, 1 patch, Transdermal, Once per day on Mon Thu, Daniel Tyler MD  •  thiamine (VITAMIN B-1) tablet 100 mg, 100 mg, Oral, Daily, 100 mg at 01/09/21 0912 **AND** multivitamin (THERAGRAN) tablet 1 tablet, 1 tablet, Oral, Daily, 1 tablet at 01/09/21 0912 **AND** folic acid (FOLVITE) tablet 1 mg, 1 mg, Oral, Daily, Jasmeet Montiel MD, 1 mg at 01/09/21 0912  •  HYDROcodone-acetaminophen (NORCO) 5-325 MG per tablet 1 tablet, 1 tablet, Oral, Q6H PRN, Daniel Tyler MD, 1 tablet at 01/09/21 1249  •  liothyronine (CYTOMEL) tablet 5 mcg, 5 mcg, Oral, Daily, Daniel Tyler MD, 5 mcg at 01/09/21 0912  •  LORazepam (ATIVAN) tablet 0.5 mg, 0.5 mg, Oral, Q2H PRN **OR** LORazepam (ATIVAN) injection 0.5 mg, 0.5 mg, Intravenous, Q2H PRN **OR** LORazepam (ATIVAN) tablet 1 mg, 1 mg, Oral, Q1H PRN **OR** LORazepam (ATIVAN) injection 1 mg, 1 mg, Intravenous, Q1H PRN **OR** LORazepam (ATIVAN) injection 1 mg, 1 mg, Intravenous, Q15 Min PRN **OR** LORazepam (ATIVAN) injection 1 mg, 1 mg, Intramuscular, Q15 Min PRN, Daniel Tyler MD  •  Magnesium Sulfate 2 gram Bolus, followed by 8 gram infusion (total Mg dose 10 grams)- Mg less than or equal to 1mg/dL, 2 g, Intravenous, PRN **OR** Magnesium Sulfate 2 gram / 50mL Infusion (GIVE X 3 BAGS TO EQUAL 6GM TOTAL DOSE) - Mg  1.1 - 1.5 mg/dl, 2 g, Intravenous, PRN **OR** Magnesium Sulfate 4 gram infusion- Mg 1.6-1.9 mg/dL, 4 g, Intravenous, PRN, Luis Yee, APRN  •  ondansetron (ZOFRAN) tablet 4 mg, 4 mg, Oral, Q6H PRN **OR** ondansetron (ZOFRAN) injection 4 mg, 4 mg, Intravenous, Q6H PRN, Humberto Tyler MD  •  pantoprazole (PROTONIX) EC tablet 40 mg, 40 mg, Oral, QAM, Humberto Tyler MD, 40 mg at 01/09/21 0624  •  potassium chloride (K-DUR,KLOR-CON) ER tablet 40 mEq, 40 mEq, Oral, PRN, Luis Yee, APRN, 40 mEq at 01/08/21 2201  •  potassium chloride (KLOR-CON) packet 40 mEq, 40 mEq, Oral, PRN, Luis Yee, APRN  •  progesterone (PROMETRIUM) capsule 100 mg, 100 mg, Oral, Daily, Humberto Tyler MD, 100 mg at 01/09/21 0912  •  rosuvastatin (CRESTOR) tablet 5 mg, 5 mg, Oral, Nightly, Humberto Tyler MD, 5 mg at 01/08/21 2019  •  [COMPLETED] Insert peripheral IV, , , Once **AND** sodium chloride 0.9 % flush 10 mL, 10 mL, Intravenous, PRN, Ld Rodríguez MD  •  vitamin B-12 (CYANOCOBALAMIN) tablet 1,000 mcg, 1,000 mcg, Oral, Nightly, Humberto Tyler MD, 1,000 mcg at 01/08/21 2019  •  zolpidem (AMBIEN) tablet 5 mg, 5 mg, Oral, Nightly PRN, Humberto Tyler MD, 5 mg at 01/08/21 8608    ASSESSMENT  Severe hyponatremia  Alcohol intoxication  Chronic pain syndrome  Hypertension  Hyperlipidemia  Osteoarthritis  Gastroesophageal reflux disease    PLAN  CPM  IV fluids  Detox   CIWA protocol  Resume Cymbalta and pain medications  Nephrology consult appreciated  Adjust home medications  Stress ulcer DVT prophylaxis  Supportive care  PT/OT  Discussed with nursing staff  Follow closely further recommendation current hospital course    HUMBERTO TYLER MD

## 2021-01-09 NOTE — PLAN OF CARE
Patient A&O x4, VSS, RA, denies chest pain or SOB. Sodium level 128, nephrology on call  notified, no new orders.  Right ankle pain management w/ PRN medication tolerate well. Urine sample still pending. Will continue t monitor.  Problem: Adult Inpatient Plan of Care  Goal: Plan of Care Review  Outcome: Ongoing, Progressing  Flowsheets (Taken 1/9/2021 0456)  Plan of Care Reviewed With: patient  Goal: Patient-Specific Goal (Individualized)  Outcome: Ongoing, Progressing  Goal: Absence of Hospital-Acquired Illness or Injury  Outcome: Ongoing, Progressing  Intervention: Identify and Manage Fall Risk  Flowsheets  Taken 1/9/2021 0455  Safety Promotion/Fall Prevention:  • activity supervised  • assistive device/personal items within reach  • clutter free environment maintained  Taken 1/9/2021 0239  Safety Promotion/Fall Prevention:  • activity supervised  • assistive device/personal items within reach  • clutter free environment maintained  Taken 1/9/2021 0013  Safety Promotion/Fall Prevention:  • activity supervised  • assistive device/personal items within reach  • clutter free environment maintained  Taken 1/8/2021 2248  Safety Promotion/Fall Prevention:  • activity supervised  • assistive device/personal items within reach  • clutter free environment maintained  Taken 1/8/2021 2019  Safety Promotion/Fall Prevention:  • activity supervised  • assistive device/personal items within reach  • clutter free environment maintained  Intervention: Prevent Skin Injury  Flowsheets  Taken 1/9/2021 0455  Body Position: position changed independently  Taken 1/9/2021 0239  Body Position: position changed independently  Taken 1/9/2021 0013  Body Position: position changed independently  Taken 1/8/2021 2248  Body Position: position changed independently  Taken 1/8/2021 2019  Body Position: position changed independently  Intervention: Prevent and Manage VTE (venous thromboembolism) Risk  Flowsheets (Taken 1/8/2021 2019)  VTE  Prevention/Management:  • bilateral  • dorsiflexion/plantar flexion performed  Intervention: Prevent Infection  Flowsheets  Taken 1/9/2021 0455  Infection Prevention: rest/sleep promoted  Taken 1/9/2021 0239  Infection Prevention: rest/sleep promoted  Taken 1/9/2021 0013  Infection Prevention: rest/sleep promoted  Taken 1/8/2021 2248  Infection Prevention: environmental surveillance performed  Taken 1/8/2021 2019  Infection Prevention: environmental surveillance performed  Goal: Optimal Comfort and Wellbeing  Outcome: Ongoing, Progressing  Intervention: Provide Person-Centered Care  Flowsheets (Taken 1/8/2021 2019)  Trust Relationship/Rapport: care explained  Goal: Readiness for Transition of Care  Outcome: Ongoing, Progressing     Problem: Fall Injury Risk  Goal: Absence of Fall and Fall-Related Injury  Outcome: Ongoing, Progressing  Intervention: Identify and Manage Contributors to Fall Injury Risk  Flowsheets  Taken 1/9/2021 0455  Medication Review/Management: medications reviewed  Taken 1/9/2021 0239  Medication Review/Management: medications reviewed  Taken 1/9/2021 0013  Medication Review/Management: medications reviewed  Taken 1/8/2021 2248  Medication Review/Management: medications reviewed  Taken 1/8/2021 2019  Medication Review/Management: medications reviewed  Intervention: Promote Injury-Free Environment  Flowsheets  Taken 1/9/2021 0455  Safety Promotion/Fall Prevention:  • activity supervised  • assistive device/personal items within reach  • clutter free environment maintained  Taken 1/9/2021 0239  Safety Promotion/Fall Prevention:  • activity supervised  • assistive device/personal items within reach  • clutter free environment maintained  Taken 1/9/2021 0013  Safety Promotion/Fall Prevention:  • activity supervised  • assistive device/personal items within reach  • clutter free environment maintained  Taken 1/8/2021 2248  Safety Promotion/Fall Prevention:  • activity supervised  • assistive  device/personal items within reach  • clutter free environment maintained  Taken 1/8/2021 2019  Safety Promotion/Fall Prevention:  • activity supervised  • assistive device/personal items within reach  • clutter free environment maintained     Problem: Pain Chronic (Persistent) (Comorbidity Management)  Goal: Acceptable Pain Control and Functional Ability  Outcome: Ongoing, Progressing  Intervention: Develop Pain Management Plan  Flowsheets  Taken 1/8/2021 2248  Pain Management Interventions: see MAR  Taken 1/8/2021 2019  Pain Management Interventions: quiet environment facilitated  Intervention: Manage Persistent Pain  Flowsheets  Taken 1/9/2021 0458  Bowel Elimination Promotion: commode/bedpan at bedside  Taken 1/9/2021 0455  Medication Review/Management: medications reviewed  Taken 1/9/2021 0239  Medication Review/Management: medications reviewed  Taken 1/9/2021 0013  Medication Review/Management: medications reviewed  Taken 1/8/2021 2248  Medication Review/Management: medications reviewed  Taken 1/8/2021 2019  Sleep/Rest Enhancement: consistent schedule promoted  Medication Review/Management: medications reviewed  Intervention: Optimize Psychosocial Wellbeing  Flowsheets  Taken 1/9/2021 0458  Supportive Measures: active listening utilized  Taken 1/8/2021 2019  Diversional Activities: television  Family/Support System Care: support provided

## 2021-01-09 NOTE — PLAN OF CARE
Continue to monitor sodium levels Q4, adjust IVF accordingly. Pt c/o of pain in R ankle from recent surgery as well as back pain, Norco Q6 given. CIWA score remained at a 0 throughout shift, no withdrawal symptoms noted. Up to BSC w/ standby assist. A&Ox4. VSS. Will continue to monitor.      Problem: Adult Inpatient Plan of Care  Goal: Plan of Care Review  Outcome: Ongoing, Progressing  Flowsheets (Taken 1/9/2021 1827)  Progress: improving  Plan of Care Reviewed With: patient  Goal: Patient-Specific Goal (Individualized)  Outcome: Ongoing, Progressing  Goal: Absence of Hospital-Acquired Illness or Injury  Outcome: Ongoing, Progressing  Intervention: Identify and Manage Fall Risk  Recent Flowsheet Documentation  Taken 1/9/2021 1800 by Devorah Landa, RN  Safety Promotion/Fall Prevention:   safety round/check completed   room organization consistent   nonskid shoes/slippers when out of bed   fall prevention program maintained   clutter free environment maintained   assistive device/personal items within reach   activity supervised  Taken 1/9/2021 1600 by Devorah Landa, RN  Safety Promotion/Fall Prevention:   safety round/check completed   room organization consistent   nonskid shoes/slippers when out of bed   fall prevention program maintained   clutter free environment maintained   assistive device/personal items within reach   activity supervised  Taken 1/9/2021 1435 by Devorah Landa, RN  Safety Promotion/Fall Prevention:   safety round/check completed   room organization consistent   nonskid shoes/slippers when out of bed   fall prevention program maintained   assistive device/personal items within reach   clutter free environment maintained   activity supervised  Taken 1/9/2021 1249 by Devorah Landa, RN  Safety Promotion/Fall Prevention:   safety round/check completed   room organization consistent   nonskid shoes/slippers when out of bed   fall prevention program maintained   clutter free environment  maintained   assistive device/personal items within reach   activity supervised  Taken 1/9/2021 1000 by Devorah Landa RN  Safety Promotion/Fall Prevention:   room organization consistent   safety round/check completed   nonskid shoes/slippers when out of bed   fall prevention program maintained   clutter free environment maintained   assistive device/personal items within reach   activity supervised  Taken 1/9/2021 0910 by Devorah Landa RN  Safety Promotion/Fall Prevention:   safety round/check completed   room organization consistent   nonskid shoes/slippers when out of bed   fall prevention program maintained   clutter free environment maintained   assistive device/personal items within reach   activity supervised  Taken 1/9/2021 0800 by Devorah Landa RN  Safety Promotion/Fall Prevention:   safety round/check completed   room organization consistent   nonskid shoes/slippers when out of bed   fall prevention program maintained   clutter free environment maintained   assistive device/personal items within reach   activity supervised  Intervention: Prevent Skin Injury  Recent Flowsheet Documentation  Taken 1/9/2021 1800 by Devorah Landa RN  Body Position: position changed independently  Taken 1/9/2021 1249 by Devorah Landa RN  Body Position: position changed independently  Taken 1/9/2021 0910 by Devorah Landa RN  Body Position:   position changed independently   sitting up in bed  Intervention: Prevent and Manage VTE (venous thromboembolism) Risk  Recent Flowsheet Documentation  Taken 1/9/2021 0910 by Devorah Landa RN  VTE Prevention/Management:   bilateral   dorsiflexion/plantar flexion performed  Intervention: Prevent Infection  Recent Flowsheet Documentation  Taken 1/9/2021 1600 by Devorah Landa RN  Infection Prevention:   visitors restricted/screened   single patient room provided   rest/sleep promoted   personal protective equipment utilized   hand hygiene promoted   equipment surfaces  disinfected  Taken 1/9/2021 0910 by Devorah Landa RN  Infection Prevention:   visitors restricted/screened   single patient room provided   rest/sleep promoted   personal protective equipment utilized   hand hygiene promoted  Goal: Optimal Comfort and Wellbeing  Outcome: Ongoing, Progressing  Goal: Readiness for Transition of Care  Outcome: Ongoing, Progressing     Problem: Fall Injury Risk  Goal: Absence of Fall and Fall-Related Injury  Outcome: Ongoing, Progressing  Intervention: Identify and Manage Contributors to Fall Injury Risk  Recent Flowsheet Documentation  Taken 1/9/2021 1800 by Devorah Landa RN  Medication Review/Management: medications reviewed  Taken 1/9/2021 1600 by Devorah Landa RN  Medication Review/Management: medications reviewed  Taken 1/9/2021 1435 by Devorah Landa RN  Medication Review/Management: medications reviewed  Taken 1/9/2021 1249 by Devorah Landa RN  Medication Review/Management: medications reviewed  Taken 1/9/2021 1000 by Devorah Landa RN  Medication Review/Management: medications reviewed  Taken 1/9/2021 0910 by Devorah Landa RN  Medication Review/Management: medications reviewed  Taken 1/9/2021 0800 by Devorah Landa RN  Medication Review/Management: medications reviewed  Intervention: Promote Injury-Free Environment  Recent Flowsheet Documentation  Taken 1/9/2021 1800 by Devorah Landa RN  Safety Promotion/Fall Prevention:   safety round/check completed   room organization consistent   nonskid shoes/slippers when out of bed   fall prevention program maintained   clutter free environment maintained   assistive device/personal items within reach   activity supervised  Taken 1/9/2021 1600 by Devorah Landa RN  Safety Promotion/Fall Prevention:   safety round/check completed   room organization consistent   nonskid shoes/slippers when out of bed   fall prevention program maintained   clutter free environment maintained   assistive device/personal items within  reach   activity supervised  Taken 1/9/2021 1435 by Devorah Landa, RN  Safety Promotion/Fall Prevention:   safety round/check completed   room organization consistent   nonskid shoes/slippers when out of bed   fall prevention program maintained   assistive device/personal items within reach   clutter free environment maintained   activity supervised  Taken 1/9/2021 1249 by Devorah Landa, RN  Safety Promotion/Fall Prevention:   safety round/check completed   room organization consistent   nonskid shoes/slippers when out of bed   fall prevention program maintained   clutter free environment maintained   assistive device/personal items within reach   activity supervised  Taken 1/9/2021 1000 by Devorah Landa, RN  Safety Promotion/Fall Prevention:   room organization consistent   safety round/check completed   nonskid shoes/slippers when out of bed   fall prevention program maintained   clutter free environment maintained   assistive device/personal items within reach   activity supervised  Taken 1/9/2021 0910 by Devorah Landa, RN  Safety Promotion/Fall Prevention:   safety round/check completed   room organization consistent   nonskid shoes/slippers when out of bed   fall prevention program maintained   clutter free environment maintained   assistive device/personal items within reach   activity supervised  Taken 1/9/2021 0800 by Devorah Landa, RN  Safety Promotion/Fall Prevention:   safety round/check completed   room organization consistent   nonskid shoes/slippers when out of bed   fall prevention program maintained   clutter free environment maintained   assistive device/personal items within reach   activity supervised   Goal Outcome Evaluation:  Plan of Care Reviewed With: patient  Progress: improving

## 2021-01-09 NOTE — PROGRESS NOTES
"RENAL/[facility]:     LOS: 1 day    Patient Care Team:  Elizabeth Puente MD as PCP - General  Eden Booker MD as Consulting Physician (Cardiology)  Zeynep Phan MD as Consulting Physician (Obstetrics and Gynecology)    Chief Complaint:  Hyponatremia    Subjective     Interval History:   Awake and alert with no complaints.    Objective     Vital Sign Min/Max for last 24 hours  Temp  Min: 97.1 °F (36.2 °C)  Max: 98.9 °F (37.2 °C)   BP  Min: 104/54  Max: 129/77   Pulse  Min: 78  Max: 100   Resp  Min: 16  Max: 18   SpO2  Min: 95 %  Max: 98 %   No data recorded   Weight  Min: 73.2 kg (161 lb 6 oz)  Max: 73.2 kg (161 lb 6 oz)     Flowsheet Rows      First Filed Value   Admission Height  172.7 cm (67.99\") Documented at 01/08/2021 1304   Admission Weight  73.2 kg (161 lb 6 oz) Documented at 01/08/2021 1304          No intake/output data recorded.  I/O last 3 completed shifts:  In: 150 [I.V.:150]  Out: -     Physical Exam:  GEN: Awake, NAD  ENT: PERRL, EOMI, MMM  NECK: Supple, no JVD  CHEST: CTAB, no W/R/C  CV: RRR, no M/G/R  ABD: Soft, NT, +BS  SKIN: Warm and Dry  NEURO: CN's intact      WBC WBC   Date Value Ref Range Status   01/09/2021 6.56 3.40 - 10.80 10*3/mm3 Final   01/08/2021 6.38 3.40 - 10.80 10*3/mm3 Final      HGB Hemoglobin   Date Value Ref Range Status   01/09/2021 12.1 12.0 - 15.9 g/dL Final   01/08/2021 12.2 12.0 - 15.9 g/dL Final      HCT Hematocrit   Date Value Ref Range Status   01/09/2021 35.3 34.0 - 46.6 % Final   01/08/2021 34.3 34.0 - 46.6 % Final      Platlets No results found for: LABPLAT   MCV MCV   Date Value Ref Range Status   01/09/2021 95.7 79.0 - 97.0 fL Final   01/08/2021 94.0 79.0 - 97.0 fL Final          Sodium Sodium   Date Value Ref Range Status   01/09/2021 129 (L) 136 - 145 mmol/L Final   01/09/2021 130 (L) 136 - 145 mmol/L Final   01/09/2021 129 (L) 136 - 145 mmol/L Final   01/09/2021 128 (L) 136 - 145 mmol/L Final   01/08/2021 126 (L) 136 - 145 mmol/L Final   01/08/2021 126 " (L) 136 - 145 mmol/L Final   01/08/2021 125 (L) 136 - 145 mmol/L Final   01/08/2021 116 (C) 136 - 145 mmol/L Final      Potassium Potassium   Date Value Ref Range Status   01/09/2021 4.6 3.5 - 5.2 mmol/L Final   01/08/2021 3.0 (L) 3.5 - 5.2 mmol/L Final      Chloride Chloride   Date Value Ref Range Status   01/09/2021 100 98 - 107 mmol/L Final   01/08/2021 83 (L) 98 - 107 mmol/L Final      CO2 CO2   Date Value Ref Range Status   01/09/2021 22.1 22.0 - 29.0 mmol/L Final   01/08/2021 17.6 (L) 22.0 - 29.0 mmol/L Final      BUN BUN   Date Value Ref Range Status   01/09/2021 4 (L) 8 - 23 mg/dL Final   01/08/2021 6 (L) 8 - 23 mg/dL Final      Creatinine Creatinine   Date Value Ref Range Status   01/09/2021 0.56 (L) 0.57 - 1.00 mg/dL Final   01/08/2021 0.54 (L) 0.57 - 1.00 mg/dL Final      Calcium Calcium   Date Value Ref Range Status   01/09/2021 9.1 8.6 - 10.5 mg/dL Final   01/08/2021 7.6 (L) 8.6 - 10.5 mg/dL Final      PO4 No results found for: CAPO4   Albumin Albumin   Date Value Ref Range Status   01/09/2021 3.90 3.50 - 5.20 g/dL Final   01/08/2021 4.00 3.50 - 5.20 g/dL Final      Magnesium Magnesium   Date Value Ref Range Status   01/09/2021 1.7 1.6 - 2.4 mg/dL Final   01/08/2021 1.7 1.6 - 2.4 mg/dL Final      Uric Acid Uric Acid   Date Value Ref Range Status   01/08/2021 6.7 (H) 2.4 - 5.7 mg/dL Final           Results Review:     I reviewed the patient's new clinical results.    budesonide-formoterol, 2 puff, Inhalation, BID - RT  carvedilol, 12.5 mg, Oral, BID  [START ON 1/11/2021] estradiol, 1 patch, Transdermal, Once per day on Mon Thu  folic acid, 1 mg, Oral, Daily    And  vitamin B-1, 100 mg, Oral, Daily    And  multivitamin, 1 tablet, Oral, Daily  liothyronine, 5 mcg, Oral, Daily  pantoprazole, 40 mg, Oral, QAM  progesterone, 100 mg, Oral, Daily  rosuvastatin, 5 mg, Oral, Nightly  vitamin B-12, 1,000 mcg, Oral, Nightly      dextrose, 100 mL/hr, Last Rate: 100 mL/hr (01/09/21 0911)        Medication Review:  Reviewed    Assessment/Plan       Hyponatremia    Alcohol abuse    Metabolic encephalopathy    Dehydration      Plan: Sodium has risen 13 points in 28 hours.  Rate has slowed on D5W - will increase to 100 cc/hr and follow up recheck level.  Goal to keep sodium in mid 120's today.  Will follow.    Chris Brock MD   [facility]  01/09/21  09:16 EST

## 2021-01-10 VITALS
HEIGHT: 68 IN | TEMPERATURE: 97.5 F | HEART RATE: 72 BPM | SYSTOLIC BLOOD PRESSURE: 125 MMHG | OXYGEN SATURATION: 99 % | DIASTOLIC BLOOD PRESSURE: 76 MMHG | WEIGHT: 161.38 LBS | BODY MASS INDEX: 24.46 KG/M2 | RESPIRATION RATE: 16 BRPM

## 2021-01-10 LAB
ALBUMIN SERPL-MCNC: 4.1 G/DL (ref 3.5–5.2)
ALBUMIN/GLOB SERPL: 2.2 G/DL
ALP SERPL-CCNC: 159 U/L (ref 39–117)
ALT SERPL W P-5'-P-CCNC: 34 U/L (ref 1–33)
ANION GAP SERPL CALCULATED.3IONS-SCNC: 9.1 MMOL/L (ref 5–15)
AST SERPL-CCNC: 29 U/L (ref 1–32)
BASOPHILS # BLD AUTO: 0.05 10*3/MM3 (ref 0–0.2)
BASOPHILS NFR BLD AUTO: 0.8 % (ref 0–1.5)
BILIRUB SERPL-MCNC: 0.4 MG/DL (ref 0–1.2)
BUN SERPL-MCNC: 6 MG/DL (ref 8–23)
BUN/CREAT SERPL: 13 (ref 7–25)
CALCIUM SPEC-SCNC: 8.9 MG/DL (ref 8.6–10.5)
CHLORIDE SERPL-SCNC: 98 MMOL/L (ref 98–107)
CO2 SERPL-SCNC: 22.9 MMOL/L (ref 22–29)
CREAT SERPL-MCNC: 0.46 MG/DL (ref 0.57–1)
DEPRECATED RDW RBC AUTO: 45.3 FL (ref 37–54)
EOSINOPHIL # BLD AUTO: 0.23 10*3/MM3 (ref 0–0.4)
EOSINOPHIL NFR BLD AUTO: 3.5 % (ref 0.3–6.2)
ERYTHROCYTE [DISTWIDTH] IN BLOOD BY AUTOMATED COUNT: 12.5 % (ref 12.3–15.4)
GFR SERPL CREATININE-BSD FRML MDRD: 136 ML/MIN/1.73
GLOBULIN UR ELPH-MCNC: 1.9 GM/DL
GLUCOSE SERPL-MCNC: 102 MG/DL (ref 65–99)
HCT VFR BLD AUTO: 35.8 % (ref 34–46.6)
HGB BLD-MCNC: 12.1 G/DL (ref 12–15.9)
IMM GRANULOCYTES # BLD AUTO: 0.02 10*3/MM3 (ref 0–0.05)
IMM GRANULOCYTES NFR BLD AUTO: 0.3 % (ref 0–0.5)
LYMPHOCYTES # BLD AUTO: 2.5 10*3/MM3 (ref 0.7–3.1)
LYMPHOCYTES NFR BLD AUTO: 37.8 % (ref 19.6–45.3)
MAGNESIUM SERPL-MCNC: 1.4 MG/DL (ref 1.6–2.4)
MCH RBC QN AUTO: 33.2 PG (ref 26.6–33)
MCHC RBC AUTO-ENTMCNC: 33.8 G/DL (ref 31.5–35.7)
MCV RBC AUTO: 98.4 FL (ref 79–97)
MONOCYTES # BLD AUTO: 1.03 10*3/MM3 (ref 0.1–0.9)
MONOCYTES NFR BLD AUTO: 15.6 % (ref 5–12)
NEUTROPHILS NFR BLD AUTO: 2.79 10*3/MM3 (ref 1.7–7)
NEUTROPHILS NFR BLD AUTO: 42 % (ref 42.7–76)
NRBC BLD AUTO-RTO: 0 /100 WBC (ref 0–0.2)
PHOSPHATE SERPL-MCNC: 2.9 MG/DL (ref 2.5–4.5)
PLATELET # BLD AUTO: 286 10*3/MM3 (ref 140–450)
PMV BLD AUTO: 10.6 FL (ref 6–12)
POTASSIUM SERPL-SCNC: 3.8 MMOL/L (ref 3.5–5.2)
PROT SERPL-MCNC: 6 G/DL (ref 6–8.5)
RBC # BLD AUTO: 3.64 10*6/MM3 (ref 3.77–5.28)
SODIUM SERPL-SCNC: 130 MMOL/L (ref 136–145)
SODIUM SERPL-SCNC: 131 MMOL/L (ref 136–145)
VIT B12 BLD-MCNC: 658 PG/ML (ref 211–946)
WBC # BLD AUTO: 6.62 10*3/MM3 (ref 3.4–10.8)

## 2021-01-10 PROCEDURE — 25010000002 MAGNESIUM SULFATE 2 GM/50ML SOLUTION: Performed by: INTERNAL MEDICINE

## 2021-01-10 PROCEDURE — 85025 COMPLETE CBC W/AUTO DIFF WBC: CPT | Performed by: HOSPITALIST

## 2021-01-10 PROCEDURE — 84295 ASSAY OF SERUM SODIUM: CPT | Performed by: INTERNAL MEDICINE

## 2021-01-10 PROCEDURE — 94799 UNLISTED PULMONARY SVC/PX: CPT

## 2021-01-10 PROCEDURE — 82607 VITAMIN B-12: CPT | Performed by: HOSPITALIST

## 2021-01-10 PROCEDURE — 84100 ASSAY OF PHOSPHORUS: CPT | Performed by: INTERNAL MEDICINE

## 2021-01-10 PROCEDURE — 83735 ASSAY OF MAGNESIUM: CPT | Performed by: INTERNAL MEDICINE

## 2021-01-10 PROCEDURE — 80053 COMPREHEN METABOLIC PANEL: CPT | Performed by: HOSPITALIST

## 2021-01-10 PROCEDURE — 97110 THERAPEUTIC EXERCISES: CPT

## 2021-01-10 PROCEDURE — 97162 PT EVAL MOD COMPLEX 30 MIN: CPT

## 2021-01-10 RX ORDER — MAGNESIUM SULFATE HEPTAHYDRATE 40 MG/ML
2 INJECTION, SOLUTION INTRAVENOUS ONCE
Status: COMPLETED | OUTPATIENT
Start: 2021-01-10 | End: 2021-01-10

## 2021-01-10 RX ORDER — MELATONIN
1000 DAILY
Qty: 30 TABLET | Refills: 0 | Status: SHIPPED | OUTPATIENT
Start: 2021-01-11 | End: 2021-02-10

## 2021-01-10 RX ADMIN — DULOXETINE HYDROCHLORIDE 20 MG: 20 CAPSULE, DELAYED RELEASE ORAL at 08:04

## 2021-01-10 RX ADMIN — HYDROCODONE BITARTRATE AND ACETAMINOPHEN 1 TABLET: 5; 325 TABLET ORAL at 13:30

## 2021-01-10 RX ADMIN — MAGNESIUM SULFATE HEPTAHYDRATE 2 G: 40 INJECTION, SOLUTION INTRAVENOUS at 08:04

## 2021-01-10 RX ADMIN — PANTOPRAZOLE SODIUM 40 MG: 40 TABLET, DELAYED RELEASE ORAL at 06:24

## 2021-01-10 RX ADMIN — FOLIC ACID 1 MG: 1 TABLET ORAL at 08:04

## 2021-01-10 RX ADMIN — Medication 1000 UNITS: at 08:04

## 2021-01-10 RX ADMIN — CARVEDILOL 12.5 MG: 12.5 TABLET, FILM COATED ORAL at 08:04

## 2021-01-10 RX ADMIN — LIOTHYRONINE SODIUM 5 MCG: 5 TABLET ORAL at 08:06

## 2021-01-10 RX ADMIN — BUDESONIDE AND FORMOTEROL FUMARATE DIHYDRATE 2 PUFF: 80; 4.5 AEROSOL RESPIRATORY (INHALATION) at 08:32

## 2021-01-10 RX ADMIN — Medication 100 MG: at 08:04

## 2021-01-10 RX ADMIN — PROGESTERONE 100 MG: 100 CAPSULE ORAL at 08:04

## 2021-01-10 RX ADMIN — Medication 1 TABLET: at 08:04

## 2021-01-10 RX ADMIN — HYDROCODONE BITARTRATE AND ACETAMINOPHEN 1 TABLET: 5; 325 TABLET ORAL at 06:27

## 2021-01-10 NOTE — PROGRESS NOTES
Access did follow up with pt. Pt was sleeping but able to wake. Access gave pt list of outpt therapy resources. Access will follow briefly.
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
## 2021-01-10 NOTE — PLAN OF CARE
Problem: Adult Inpatient Plan of Care  Goal: Plan of Care Review  Outcome: Ongoing, Progressing  Flowsheets (Taken 1/10/2021 1534)  Plan of Care Reviewed With: patient  Outcome Summary: VSS.  Norco for pain x1.  A&Ox4.  Ambulated around unit. Home meds retrieved from pharmacy.  Discharge to home pending transportation.  Goal: Patient-Specific Goal (Individualized)  Outcome: Ongoing, Progressing  Goal: Absence of Hospital-Acquired Illness or Injury  Outcome: Ongoing, Progressing  Intervention: Identify and Manage Fall Risk  Recent Flowsheet Documentation  Taken 1/10/2021 1400 by Aquiles Anderson RN  Safety Promotion/Fall Prevention:   activity supervised   assistive device/personal items within reach   clutter free environment maintained   elopement precautions   fall prevention program maintained   gait belt   lighting adjusted   mobility aid in reach   muscle strengthening facilitated   nonskid shoes/slippers when out of bed   room organization consistent   safety round/check completed  Taken 1/10/2021 1200 by Aquiles Anderson RN  Safety Promotion/Fall Prevention:   activity supervised   assistive device/personal items within reach   clutter free environment maintained   elopement precautions   fall prevention program maintained   gait belt   lighting adjusted   mobility aid in reach   muscle strengthening facilitated   nonskid shoes/slippers when out of bed   room organization consistent   safety round/check completed  Taken 1/10/2021 1000 by Aquiles Anderson RN  Safety Promotion/Fall Prevention:   assistive device/personal items within reach   clutter free environment maintained   fall prevention program maintained  Taken 1/10/2021 0810 by Aquiles Anderson RN  Safety Promotion/Fall Prevention:   activity supervised   assistive device/personal items within reach   clutter free environment maintained   fall prevention program maintained   gait belt   lighting adjusted   mobility aid in reach   muscle  strengthening facilitated   nonskid shoes/slippers when out of bed   room organization consistent   safety round/check completed  Intervention: Prevent Skin Injury  Recent Flowsheet Documentation  Taken 1/10/2021 0810 by Aquiles Anderson RN  Body Position: supine  Goal: Optimal Comfort and Wellbeing  Outcome: Ongoing, Progressing  Intervention: Provide Person-Centered Care  Recent Flowsheet Documentation  Taken 1/10/2021 0810 by Aquiles Anderson RN  Trust Relationship/Rapport:   care explained   choices provided   questions answered   questions encouraged  Goal: Readiness for Transition of Care  Outcome: Ongoing, Progressing     Problem: Fall Injury Risk  Goal: Absence of Fall and Fall-Related Injury  Outcome: Ongoing, Progressing  Intervention: Identify and Manage Contributors to Fall Injury Risk  Recent Flowsheet Documentation  Taken 1/10/2021 1200 by Aquiles Anderson RN  Medication Review/Management: medications reviewed  Taken 1/10/2021 1000 by Aquiles Anderson RN  Medication Review/Management: medications reviewed  Taken 1/10/2021 0810 by Aquiles Anderson RN  Medication Review/Management: medications reviewed  Self-Care Promotion: independence encouraged  Intervention: Promote Injury-Free Environment  Recent Flowsheet Documentation  Taken 1/10/2021 1400 by Aquiles Anderson RN  Safety Promotion/Fall Prevention:   activity supervised   assistive device/personal items within reach   clutter free environment maintained   elopement precautions   fall prevention program maintained   gait belt   lighting adjusted   mobility aid in reach   muscle strengthening facilitated   nonskid shoes/slippers when out of bed   room organization consistent   safety round/check completed  Taken 1/10/2021 1200 by Aquiles Anderson RN  Safety Promotion/Fall Prevention:   activity supervised   assistive device/personal items within reach   clutter free environment maintained   elopement precautions   fall prevention program  maintained   gait belt   lighting adjusted   mobility aid in reach   muscle strengthening facilitated   nonskid shoes/slippers when out of bed   room organization consistent   safety round/check completed  Taken 1/10/2021 1000 by Aquiles Anderson RN  Safety Promotion/Fall Prevention:   assistive device/personal items within reach   clutter free environment maintained   fall prevention program maintained  Taken 1/10/2021 0810 by Aquiles Anderson RN  Safety Promotion/Fall Prevention:   activity supervised   assistive device/personal items within reach   clutter free environment maintained   fall prevention program maintained   gait belt   lighting adjusted   mobility aid in reach   muscle strengthening facilitated   nonskid shoes/slippers when out of bed   room organization consistent   safety round/check completed     Problem: Pain Chronic (Persistent) (Comorbidity Management)  Goal: Acceptable Pain Control and Functional Ability  Outcome: Ongoing, Progressing  Intervention: Manage Persistent Pain  Recent Flowsheet Documentation  Taken 1/10/2021 1200 by Aquiles Anderson RN  Medication Review/Management: medications reviewed  Taken 1/10/2021 1000 by Aquiles Anderson RN  Medication Review/Management: medications reviewed  Taken 1/10/2021 0810 by Aquiles Anderson RN  Sleep/Rest Enhancement: consistent schedule promoted  Medication Review/Management: medications reviewed  Intervention: Optimize Psychosocial Wellbeing  Recent Flowsheet Documentation  Taken 1/10/2021 0810 by Aquiles Anderson RN  Supportive Measures:   active listening utilized   self-care encouraged   Goal Outcome Evaluation:  Plan of Care Reviewed With: patient  Progress: improving  Outcome Summary: RADHAS.  Norco for pain x1.  A&Ox4.  Ambulated around unit. Home meds retrieved from pharmacy.  Discharge to home pending transportation.

## 2021-01-10 NOTE — PLAN OF CARE
Problem: Adult Inpatient Plan of Care  Goal: Plan of Care Review  1/10/2021 1536 by Aquiles Anderson RN  Outcome: Met  1/10/2021 1534 by Aquiles Anderson RN  Outcome: Ongoing, Progressing  Flowsheets (Taken 1/10/2021 1534)  Plan of Care Reviewed With: patient  Outcome Summary: VSS.  Norco for pain x1.  A&Ox4.  Ambulated around unit. Home meds retrieved from pharmacy.  Discharge to home pending transportation.  Goal: Patient-Specific Goal (Individualized)  1/10/2021 1536 by Aquiles Anderson RN  Outcome: Met  1/10/2021 1534 by Aquiles Anderson RN  Outcome: Ongoing, Progressing  Goal: Absence of Hospital-Acquired Illness or Injury  1/10/2021 1536 by Aquiles Anderson RN  Outcome: Met  1/10/2021 1534 by Aquiles Anderson RN  Outcome: Ongoing, Progressing  Intervention: Identify and Manage Fall Risk  Recent Flowsheet Documentation  Taken 1/10/2021 1400 by Aquiles Anderson RN  Safety Promotion/Fall Prevention:   activity supervised   assistive device/personal items within reach   clutter free environment maintained   elopement precautions   fall prevention program maintained   gait belt   lighting adjusted   mobility aid in reach   muscle strengthening facilitated   nonskid shoes/slippers when out of bed   room organization consistent   safety round/check completed  Taken 1/10/2021 1200 by Aquiles Anderson RN  Safety Promotion/Fall Prevention:   activity supervised   assistive device/personal items within reach   clutter free environment maintained   elopement precautions   fall prevention program maintained   gait belt   lighting adjusted   mobility aid in reach   muscle strengthening facilitated   nonskid shoes/slippers when out of bed   room organization consistent   safety round/check completed  Taken 1/10/2021 1000 by Aquiles Anderson RN  Safety Promotion/Fall Prevention:   assistive device/personal items within reach   clutter free environment maintained   fall prevention program maintained  Taken 1/10/2021  0810 by Anderson, Aquiles, RN  Safety Promotion/Fall Prevention:   activity supervised   assistive device/personal items within reach   clutter free environment maintained   fall prevention program maintained   gait belt   lighting adjusted   mobility aid in reach   muscle strengthening facilitated   nonskid shoes/slippers when out of bed   room organization consistent   safety round/check completed  Intervention: Prevent Skin Injury  Recent Flowsheet Documentation  Taken 1/10/2021 0810 by Aquiles Anderson RN  Body Position: supine  Goal: Optimal Comfort and Wellbeing  1/10/2021 1536 by Aquiles Anderson RN  Outcome: Met  1/10/2021 1534 by Aquiles Anderson RN  Outcome: Ongoing, Progressing  Intervention: Provide Person-Centered Care  Recent Flowsheet Documentation  Taken 1/10/2021 0810 by Aquiles Anderson RN  Trust Relationship/Rapport:   care explained   choices provided   questions answered   questions encouraged  Goal: Readiness for Transition of Care  1/10/2021 1536 by Aquiles Anderson RN  Outcome: Met  1/10/2021 1534 by Aquiles Anderson RN  Outcome: Ongoing, Progressing     Problem: Fall Injury Risk  Goal: Absence of Fall and Fall-Related Injury  1/10/2021 1536 by Aquiles Anderson RN  Outcome: Met  1/10/2021 1534 by Aquiles Anderson RN  Outcome: Ongoing, Progressing  Intervention: Identify and Manage Contributors to Fall Injury Risk  Recent Flowsheet Documentation  Taken 1/10/2021 1200 by Aquiles Anderson RN  Medication Review/Management: medications reviewed  Taken 1/10/2021 1000 by Aquiles Anderson RN  Medication Review/Management: medications reviewed  Taken 1/10/2021 0810 by Aquiles Anderson RN  Medication Review/Management: medications reviewed  Self-Care Promotion: independence encouraged  Intervention: Promote Injury-Free Environment  Recent Flowsheet Documentation  Taken 1/10/2021 1400 by Aquiles Anderson RN  Safety Promotion/Fall Prevention:   activity supervised   assistive device/personal  items within reach   clutter free environment maintained   elopement precautions   fall prevention program maintained   gait belt   lighting adjusted   mobility aid in reach   muscle strengthening facilitated   nonskid shoes/slippers when out of bed   room organization consistent   safety round/check completed  Taken 1/10/2021 1200 by Aquiles Anderson RN  Safety Promotion/Fall Prevention:   activity supervised   assistive device/personal items within reach   clutter free environment maintained   elopement precautions   fall prevention program maintained   gait belt   lighting adjusted   mobility aid in reach   muscle strengthening facilitated   nonskid shoes/slippers when out of bed   room organization consistent   safety round/check completed  Taken 1/10/2021 1000 by Aquiles Anderson RN  Safety Promotion/Fall Prevention:   assistive device/personal items within reach   clutter free environment maintained   fall prevention program maintained  Taken 1/10/2021 0810 by Aquiles Anderson RN  Safety Promotion/Fall Prevention:   activity supervised   assistive device/personal items within reach   clutter free environment maintained   fall prevention program maintained   gait belt   lighting adjusted   mobility aid in reach   muscle strengthening facilitated   nonskid shoes/slippers when out of bed   room organization consistent   safety round/check completed     Problem: Pain Chronic (Persistent) (Comorbidity Management)  Goal: Acceptable Pain Control and Functional Ability  1/10/2021 1536 by Aquiles Anderson RN  Outcome: Met  1/10/2021 1534 by Aquiles Anderson RN  Outcome: Ongoing, Progressing  Intervention: Manage Persistent Pain  Recent Flowsheet Documentation  Taken 1/10/2021 1200 by Aquiles Anderson RN  Medication Review/Management: medications reviewed  Taken 1/10/2021 1000 by Aquiles Anderson RN  Medication Review/Management: medications reviewed  Taken 1/10/2021 0810 by Aquiles Anderson RN  Sleep/Rest  Enhancement: consistent schedule promoted  Medication Review/Management: medications reviewed  Intervention: Optimize Psychosocial Wellbeing  Recent Flowsheet Documentation  Taken 1/10/2021 0810 by Aquiles Anderson RN  Supportive Measures:   active listening utilized   self-care encouraged   Goal Outcome Evaluation:  Plan of Care Reviewed With: patient  Progress: improving  Outcome Summary: VSS.  Norco for pain x1.  A&Ox4.  Ambulated around unit. Home meds retrieved from pharmacy.  Discharge to home pending transportation.

## 2021-01-10 NOTE — DISCHARGE SUMMARY
Discharge summary    Date of admission 1/8/2021  Date of discharge 1/10/2021    Final diagnosis  Severe hyponatremia resolved  Alcohol intoxication  Hypomagnesemia corrected  Hypertension  Hyperlipidemia  Osteoarthritis  Chronic pain syndrome  Gastroesophageal reflux disease    Discharge medications    Current Facility-Administered Medications:   •  budesonide-formoterol (SYMBICORT) 80-4.5 MCG/ACT inhaler 2 puff, 2 puff, Inhalation, BID - RT, Daniel Tyler MD, 2 puff at 01/10/21 0832  •  carvedilol (COREG) tablet 12.5 mg, 12.5 mg, Oral, BID, Daniel Tyler MD, 12.5 mg at 01/10/21 0804  •  cholecalciferol (VITAMIN D3) tablet 1,000 Units, 1,000 Units, Oral, Daily, Daniel Tyler MD, 1,000 Units at 01/10/21 0804  •  DULoxetine (CYMBALTA) DR capsule 20 mg, 20 mg, Oral, Daily, Daniel Tyler MD, 20 mg at 01/10/21 0804  •  [START ON 1/11/2021] estradiol (MINIVELLE, VIVELLE-DOT) 0.075 MG/24HR patch 1 patch, 1 patch, Transdermal, Once per day on Mon Thu, Daniel Tyler MD  •  liothyronine (CYTOMEL) tablet 5 mcg, 5 mcg, Oral, Daily, Daniel Tyler MD, 5 mcg at 01/10/21 0806  •  pantoprazole (PROTONIX) EC tablet 40 mg, 40 mg, Oral, QAM, Daniel Tyler MD, 40 mg at 01/10/21 0624  •  progesterone (PROMETRIUM) capsule 100 mg, 100 mg, Oral, Daily, Daniel Tyler MD, 100 mg at 01/10/21 0804  •  rosuvastatin (CRESTOR) tablet 5 mg, 5 mg, Oral, Nightly, Daniel Tyler MD, 5 mg at 01/09/21 2018  •  [COMPLETED] Insert peripheral IV, , , Once **AND** sodium chloride 0.9 % flush 10 mL, 10 mL, Intravenous, PRN, Ld Rodríguez MD  •  vitamin B-12 (CYANOCOBALAMIN) tablet 1,000 mcg, 1,000 mcg, Oral, Nightly, Daniel Tyler MD, 1,000 mcg at 01/09/21 2018     Consults obtained  Nephrology  Access center    Procedures  None    Hospital course  65-year white female with history of hypertension hyperlipidemia cardiomyopathy who smokes and drinks admit to emergency room with altered mental status and work-up in ER revealed alcohol intoxication with  low sodium admit for management.  Patient admitted treated with detox and IV fluid and sodium followed closely.  Patient also followed by nephrology.  Patient Cymbalta was held for low sodium and restarted once sodium back to within normal range.  Patient also followed by access center for alcohol use.  Patient is back to baseline and cleared from nephrology to be discharged.  Patient sodium level is 130 at the time of discharge.  Patient fully alert oriented and stated that she will not drink alcohol and working on her tobacco abuse.  Patient has chronic pain syndrome and her pain medication restarted which she is using as needed.    Discharge diet regular    Activity as tolerated    Medication as above    Follow-up with primary doctor in 1 week and follow with nephrology per the instruction and take medication as directed    HUMBERTO JACOBSEN MD

## 2021-01-10 NOTE — PROGRESS NOTES
"RENAL/KCC:     LOS: 2 days    Patient Care Team:  Elizabeth Puente MD as PCP - General  Eden Booker MD as Consulting Physician (Cardiology)  Zeynep Phan MD as Consulting Physician (Obstetrics and Gynecology)    Chief Complaint:  Hyponatremia    Subjective     Interval History:   Awake and alert with no complaints.    Objective     Vital Sign Min/Max for last 24 hours  Temp  Min: 97.5 °F (36.4 °C)  Max: 98.6 °F (37 °C)   BP  Min: 123/70  Max: 144/98   Pulse  Min: 74  Max: 85   Resp  Min: 16  Max: 17   SpO2  Min: 96 %  Max: 100 %   No data recorded   No data recorded     Flowsheet Rows      First Filed Value   Admission Height  172.7 cm (67.99\") Documented at 01/08/2021 1304   Admission Weight  73.2 kg (161 lb 6 oz) Documented at 01/08/2021 1304          I/O this shift:  In: -   Out: 200 [Urine:200]  I/O last 3 completed shifts:  In: -   Out: 300 [Urine:300]    Physical Exam:  GEN: Awake, NAD  ENT: PERRL, EOMI, MMM  NECK: Supple, no JVD  CHEST: CTAB, no W/R/C  CV: RRR, no M/G/R  ABD: Soft, NT, +BS  SKIN: Warm and Dry  NEURO: CN's intact      WBC WBC   Date Value Ref Range Status   01/10/2021 6.62 3.40 - 10.80 10*3/mm3 Final   01/09/2021 6.56 3.40 - 10.80 10*3/mm3 Final   01/08/2021 6.38 3.40 - 10.80 10*3/mm3 Final      HGB Hemoglobin   Date Value Ref Range Status   01/10/2021 12.1 12.0 - 15.9 g/dL Final   01/09/2021 12.1 12.0 - 15.9 g/dL Final   01/08/2021 12.2 12.0 - 15.9 g/dL Final      HCT Hematocrit   Date Value Ref Range Status   01/10/2021 35.8 34.0 - 46.6 % Final   01/09/2021 35.3 34.0 - 46.6 % Final   01/08/2021 34.3 34.0 - 46.6 % Final      Platlets No results found for: LABPLAT   MCV MCV   Date Value Ref Range Status   01/10/2021 98.4 (H) 79.0 - 97.0 fL Final   01/09/2021 95.7 79.0 - 97.0 fL Final   01/08/2021 94.0 79.0 - 97.0 fL Final          Sodium Sodium   Date Value Ref Range Status   01/10/2021 130 (L) 136 - 145 mmol/L Final   01/10/2021 130 (L) 136 - 145 mmol/L Final   01/10/2021 " 131 (L) 136 - 145 mmol/L Final   01/09/2021 127 (L) 136 - 145 mmol/L Final   01/09/2021 109 (C) 136 - 145 mmol/L Final   01/09/2021 132 (L) 136 - 145 mmol/L Final   01/09/2021 129 (L) 136 - 145 mmol/L Final   01/09/2021 130 (L) 136 - 145 mmol/L Final   01/09/2021 129 (L) 136 - 145 mmol/L Final   01/09/2021 128 (L) 136 - 145 mmol/L Final   01/08/2021 126 (L) 136 - 145 mmol/L Final   01/08/2021 126 (L) 136 - 145 mmol/L Final   01/08/2021 125 (L) 136 - 145 mmol/L Final   01/08/2021 116 (C) 136 - 145 mmol/L Final      Potassium Potassium   Date Value Ref Range Status   01/10/2021 3.8 3.5 - 5.2 mmol/L Final   01/09/2021 4.6 3.5 - 5.2 mmol/L Final   01/08/2021 3.0 (L) 3.5 - 5.2 mmol/L Final      Chloride Chloride   Date Value Ref Range Status   01/10/2021 98 98 - 107 mmol/L Final   01/09/2021 100 98 - 107 mmol/L Final   01/08/2021 83 (L) 98 - 107 mmol/L Final      CO2 CO2   Date Value Ref Range Status   01/10/2021 22.9 22.0 - 29.0 mmol/L Final   01/09/2021 22.1 22.0 - 29.0 mmol/L Final   01/08/2021 17.6 (L) 22.0 - 29.0 mmol/L Final      BUN BUN   Date Value Ref Range Status   01/10/2021 6 (L) 8 - 23 mg/dL Final   01/09/2021 4 (L) 8 - 23 mg/dL Final   01/08/2021 6 (L) 8 - 23 mg/dL Final      Creatinine Creatinine   Date Value Ref Range Status   01/10/2021 0.46 (L) 0.57 - 1.00 mg/dL Final   01/09/2021 0.56 (L) 0.57 - 1.00 mg/dL Final   01/08/2021 0.54 (L) 0.57 - 1.00 mg/dL Final      Calcium Calcium   Date Value Ref Range Status   01/10/2021 8.9 8.6 - 10.5 mg/dL Final   01/09/2021 9.1 8.6 - 10.5 mg/dL Final   01/08/2021 7.6 (L) 8.6 - 10.5 mg/dL Final      PO4 No results found for: CAPO4   Albumin Albumin   Date Value Ref Range Status   01/10/2021 4.10 3.50 - 5.20 g/dL Final   01/09/2021 3.90 3.50 - 5.20 g/dL Final   01/08/2021 4.00 3.50 - 5.20 g/dL Final      Magnesium Magnesium   Date Value Ref Range Status   01/10/2021 1.4 (L) 1.6 - 2.4 mg/dL Final   01/09/2021 1.7 1.6 - 2.4 mg/dL Final   01/08/2021 1.7 1.6 - 2.4 mg/dL  Final      Uric Acid Uric Acid   Date Value Ref Range Status   01/08/2021 6.7 (H) 2.4 - 5.7 mg/dL Final           Results Review:     I reviewed the patient's new clinical results.    budesonide-formoterol, 2 puff, Inhalation, BID - RT  carvedilol, 12.5 mg, Oral, BID  cholecalciferol, 1,000 Units, Oral, Daily  DULoxetine, 20 mg, Oral, Daily  [START ON 1/11/2021] estradiol, 1 patch, Transdermal, Once per day on Mon Thu  liothyronine, 5 mcg, Oral, Daily  pantoprazole, 40 mg, Oral, QAM  progesterone, 100 mg, Oral, Daily  rosuvastatin, 5 mg, Oral, Nightly  vitamin B-12, 1,000 mcg, Oral, Nightly           Medication Review: Reviewed    Assessment/Plan       Hyponatremia    Alcohol abuse    Metabolic encephalopathy    Dehydration      Plan: Sodium 130 this AM = 14 point rise in just over 24 hours which is acceptable (patient did require DDAVP and continued D5W yesterday).  Stop D5W and serial sodium draws.  Recommend moderate fluid restriction from this point.  Dispo per primary.  Will follow.    Chris Brock MD   [facility]  01/10/21  09:14 EST

## 2021-01-10 NOTE — PROGRESS NOTES
"Daily progress note    Chief complaint   Doing better  No specific complaints   Wants to go home  Tolerating diet    History of present illness  65-year-old white female with very complex past medical history including hypertension hyperlipidemia nonischemic cardiomyopathy who also has history of alcohol and tobacco abuse quit for a while and then restarted few days ago presented to [facility] emergency room when she found at the bottom of the stairs.  Patient has been taking narcotics secondary to recent surgery and chronic pain syndrome.  Patient work-up in ER revealed severe hyponatremia and dehydration admit for management.  Patient also found to be alcohol intoxicated.  At the time of interview she is awake and alert asking for pain medication but no other complaints.     REVIEW OF SYSTEMS  Unremarkable     PHYSICAL EXAM  Blood pressure 125/76, pulse 72, temperature 97.5 °F (36.4 °C), temperature source Oral, resp. rate 16, height 172.7 cm (67.99\"), weight 73.2 kg (161 lb 6 oz), SpO2 99 %, not currently breastfeeding.    Constitutional: She is oriented to person, place, and time. No distress.   Head: Normocephalic and atraumatic.   Eyes: Pupils are equal, round, and reactive to light. EOM are normal.   Neck: Normal range of motion. Neck supple.   Cardiovascular: Normal rate, regular rhythm and normal heart sounds.   Pulmonary/Chest: Effort normal and breath sounds normal. No respiratory distress.   Abdominal: Soft. There is no abdominal tenderness. There is no rebound and no guarding.   Musculoskeletal: Normal range of motion.    No edema.   Neurological: She is alert and oriented to person, place, and time. She has normal sensation and normal strength.   Skin: Skin is warm and dry. No rash noted.   Psychiatric: Mood and affect normal.     LAB RESULTS   Lab Results (last 24 hours)     Procedure Component Value Units Date/Time    Sodium [[id number]]  (Abnormal) Collected: 01/10/21 1224    Specimen: " Blood from Hand, Left Updated: 01/10/21 1322     Sodium 130 mmol/L     Sodium [[id number]]  (Abnormal) Collected: 01/10/21 0751    Specimen: Blood from Hand, Left Updated: 01/10/21 0825     Sodium 130 mmol/L     Vitamin B12 [[id number]]  (Normal) Collected: 01/10/21 0415    Specimen: Blood Updated: 01/10/21 0523     Vitamin B-12 658 pg/mL     Narrative:      Results may be falsely increased if patient taking Biotin.      Comprehensive Metabolic Panel [[id number]]  (Abnormal) Collected: 01/10/21 0415    Specimen: Blood Updated: 01/10/21 0519     Glucose 102 mg/dL      BUN 6 mg/dL      Creatinine 0.46 mg/dL      Sodium 130 mmol/L      Potassium 3.8 mmol/L      Chloride 98 mmol/L      CO2 22.9 mmol/L      Calcium 8.9 mg/dL      Total Protein 6.0 g/dL      Albumin 4.10 g/dL      ALT (SGPT) 34 U/L      AST (SGOT) 29 U/L      Alkaline Phosphatase 159 U/L      Total Bilirubin 0.4 mg/dL      eGFR Non African Amer 136 mL/min/1.73      Globulin 1.9 gm/dL      A/G Ratio 2.2 g/dL      BUN/Creatinine Ratio 13.0     Anion Gap 9.1 mmol/L     Narrative:      GFR Normal >60  Chronic Kidney Disease <60  Kidney Failure <15      Phosphorus [[id number]]  (Normal) Collected: 01/10/21 0415    Specimen: Blood Updated: 01/10/21 0509     Phosphorus 2.9 mg/dL     Magnesium [[id number]]  (Abnormal) Collected: 01/10/21 0415    Specimen: Blood Updated: 01/10/21 0507     Magnesium 1.4 mg/dL     CBC & Differential [[id number]]  (Abnormal) Collected: 01/10/21 0415    Specimen: Blood Updated: 01/10/21 0447    Narrative:      The following orders were created for panel order CBC & Differential.  Procedure                               Abnormality         Status                     ---------                               -----------         ------                     CBC Auto Differential[[id number]]        Abnormal            Final result                 Please view results for these tests on the individual orders.    CBC Auto Differential [[id number]]   (Abnormal) Collected: 01/10/21 0415    Specimen: Blood Updated: 01/10/21 0447     WBC 6.62 10*3/mm3      RBC 3.64 10*6/mm3      Hemoglobin 12.1 g/dL      Hematocrit 35.8 %      MCV 98.4 fL      MCH 33.2 pg      MCHC 33.8 g/dL      RDW 12.5 %      RDW-SD 45.3 fl      MPV 10.6 fL      Platelets 286 10*3/mm3      Neutrophil % 42.0 %      Lymphocyte % 37.8 %      Monocyte % 15.6 %      Eosinophil % 3.5 %      Basophil % 0.8 %      Immature Grans % 0.3 %      Neutrophils, Absolute 2.79 10*3/mm3      Lymphocytes, Absolute 2.50 10*3/mm3      Monocytes, Absolute 1.03 10*3/mm3      Eosinophils, Absolute 0.23 10*3/mm3      Basophils, Absolute 0.05 10*3/mm3      Immature Grans, Absolute 0.02 10*3/mm3      nRBC 0.0 /100 WBC     Sodium [[id number]]  (Abnormal) Collected: 01/10/21 0000    Specimen: Blood Updated: 01/10/21 0052     Sodium 131 mmol/L     Sodium [[id number]]  (Abnormal) Collected: 01/09/21 1847    Specimen: Blood from Arm, Left Updated: 01/09/21 1949     Sodium 127 mmol/L     Sodium [[id number]]  (Abnormal) Collected: 01/09/21 1731    Specimen: Blood from Arm, Right Updated: 01/09/21 1813     Sodium 109 mmol/L         Imaging Results (Last 24 Hours)     ** No results found for the last 24 hours. **          Current Facility-Administered Medications:   •  acetaminophen (TYLENOL) tablet 650 mg, 650 mg, Oral, Q4H PRN, Daniel Tyler MD, 650 mg at 01/08/21 1338  •  budesonide-formoterol (SYMBICORT) 80-4.5 MCG/ACT inhaler 2 puff, 2 puff, Inhalation, BID - RT, Daniel Tyler MD, 2 puff at 01/10/21 0832  •  carvedilol (COREG) tablet 12.5 mg, 12.5 mg, Oral, BID, Daniel Tyler MD, 12.5 mg at 01/10/21 0804  •  cholecalciferol (VITAMIN D3) tablet 1,000 Units, 1,000 Units, Oral, Daily, Daniel Tyler MD, 1,000 Units at 01/10/21 0804  •  DULoxetine (CYMBALTA) DR capsule 20 mg, 20 mg, Oral, Daily, Daniel Tyler MD, 20 mg at 01/10/21 0804  •  [START ON 1/11/2021] estradiol (MINIVELLE, VIVELLE-DOT) 0.075 MG/24HR patch 1 patch, 1 patch,  Transdermal, Once per day on Mon Thu, Daniel Tyler MD  •  HYDROcodone-acetaminophen (NORCO) 5-325 MG per tablet 1 tablet, 1 tablet, Oral, Q4H PRN, Daniel Tyler MD, 1 tablet at 01/10/21 1330  •  liothyronine (CYTOMEL) tablet 5 mcg, 5 mcg, Oral, Daily, Daniel Tyler MD, 5 mcg at 01/10/21 0806  •  LORazepam (ATIVAN) tablet 0.5 mg, 0.5 mg, Oral, Q2H PRN **OR** LORazepam (ATIVAN) injection 0.5 mg, 0.5 mg, Intravenous, Q2H PRN **OR** LORazepam (ATIVAN) tablet 1 mg, 1 mg, Oral, Q1H PRN **OR** LORazepam (ATIVAN) injection 1 mg, 1 mg, Intravenous, Q1H PRN **OR** LORazepam (ATIVAN) injection 1 mg, 1 mg, Intravenous, Q15 Min PRN **OR** LORazepam (ATIVAN) injection 1 mg, 1 mg, Intramuscular, Q15 Min PRN, Daniel Tyler MD  •  Magnesium Sulfate 2 gram Bolus, followed by 8 gram infusion (total Mg dose 10 grams)- Mg less than or equal to 1mg/dL, 2 g, Intravenous, PRN **OR** Magnesium Sulfate 2 gram / 50mL Infusion (GIVE X 3 BAGS TO EQUAL 6GM TOTAL DOSE) - Mg 1.1 - 1.5 mg/dl, 2 g, Intravenous, PRN **OR** Magnesium Sulfate 4 gram infusion- Mg 1.6-1.9 mg/dL, 4 g, Intravenous, PRN, Luis Yee, APRN  •  ondansetron (ZOFRAN) tablet 4 mg, 4 mg, Oral, Q6H PRN **OR** ondansetron (ZOFRAN) injection 4 mg, 4 mg, Intravenous, Q6H PRN, Daniel Tyler MD  •  pantoprazole (PROTONIX) EC tablet 40 mg, 40 mg, Oral, QAM, Daniel Tyler MD, 40 mg at 01/10/21 0624  •  potassium chloride (K-DUR,KLOR-CON) ER tablet 40 mEq, 40 mEq, Oral, PRN, Luis Yee, APRN, 40 mEq at 01/08/21 2201  •  potassium chloride (KLOR-CON) packet 40 mEq, 40 mEq, Oral, PRN, Luis Yee, APRN  •  progesterone (PROMETRIUM) capsule 100 mg, 100 mg, Oral, Daily, Daniel Tyler MD, 100 mg at 01/10/21 0804  •  rosuvastatin (CRESTOR) tablet 5 mg, 5 mg, Oral, Nightly, Daniel Tyler MD, 5 mg at 01/09/21 2018  •  [COMPLETED] Insert peripheral IV, , , Once **AND** sodium chloride 0.9 % flush 10 mL, 10 mL, Intravenous, PRN, Ld Rodríguez MD  •  vitamin B-12  (CYANOCOBALAMIN) tablet 1,000 mcg, 1,000 mcg, Oral, Nightly, Humberto Tyler MD, 1,000 mcg at 01/09/21 2018  •  zolpidem (AMBIEN) tablet 5 mg, 5 mg, Oral, Nightly PRN, Humberto Tyler MD, 5 mg at 01/09/21 2317    ASSESSMENT  Severe hyponatremia resolved  Alcohol intoxication  Chronic pain syndrome  Hypertension  Hyperlipidemia  Osteoarthritis  Gastroesophageal reflux disease    PLAN  Discharge home  Discharge summary dictated    HUMBERTO TYLER MD

## 2021-01-10 NOTE — PLAN OF CARE
Patient A&O x4,VSS, RA, denies chest pain or SOB. Pain medication x2. IV D5 stopped as ordered. CIWA on 0. Will continue to monitor.  Problem: Adult Inpatient Plan of Care  Goal: Plan of Care Review  Outcome: Ongoing, Progressing  Flowsheets (Taken 1/10/2021 0339)  Plan of Care Reviewed With: patient  Goal: Patient-Specific Goal (Individualized)  Outcome: Ongoing, Progressing  Goal: Absence of Hospital-Acquired Illness or Injury  Outcome: Ongoing, Progressing  Intervention: Identify and Manage Fall Risk  Flowsheets  Taken 1/10/2021 0246  Safety Promotion/Fall Prevention:  • activity supervised  • assistive device/personal items within reach  • clutter free environment maintained  Taken 1/10/2021 0028  Safety Promotion/Fall Prevention:  • activity supervised  • assistive device/personal items within reach  • clutter free environment maintained  Taken 1/9/2021 2224  Safety Promotion/Fall Prevention:  • activity supervised  • assistive device/personal items within reach  • clutter free environment maintained  Taken 1/9/2021 2018  Safety Promotion/Fall Prevention:  • activity supervised  • assistive device/personal items within reach  • clutter free environment maintained  Intervention: Prevent Skin Injury  Flowsheets  Taken 1/10/2021 0246  Body Position: position changed independently  Taken 1/10/2021 0028  Body Position: position changed independently  Taken 1/9/2021 2224  Body Position: position changed independently  Taken 1/9/2021 2018  Body Position: position changed independently  Intervention: Prevent and Manage VTE (venous thromboembolism) Risk  Flowsheets  Taken 1/10/2021 0339  VTE Prevention/Management:  • bilateral  • dorsiflexion/plantar flexion performed  Taken 1/9/2021 2018  VTE Prevention/Management:  • bilateral  • sequential compression devices off  • patient refused intervention  Intervention: Prevent Infection  Flowsheets  Taken 1/10/2021 0246  Infection Prevention: rest/sleep promoted  Taken  1/10/2021 0028  Infection Prevention: rest/sleep promoted  Taken 1/9/2021 2224  Infection Prevention: rest/sleep promoted  Taken 1/9/2021 2018  Infection Prevention: rest/sleep promoted  Goal: Optimal Comfort and Wellbeing  Outcome: Ongoing, Progressing  Intervention: Provide Person-Centered Care  Flowsheets (Taken 1/9/2021 2018)  Trust Relationship/Rapport: care explained  Goal: Readiness for Transition of Care  Outcome: Ongoing, Progressing     Problem: Fall Injury Risk  Goal: Absence of Fall and Fall-Related Injury  Outcome: Ongoing, Progressing  Intervention: Identify and Manage Contributors to Fall Injury Risk  Flowsheets  Taken 1/10/2021 0339  Self-Care Promotion: independence encouraged  Taken 1/10/2021 0246  Medication Review/Management: medications reviewed  Taken 1/10/2021 0028  Medication Review/Management: medications reviewed  Taken 1/9/2021 2224  Medication Review/Management: medications reviewed  Taken 1/9/2021 2018  Medication Review/Management: medications reviewed  Intervention: Promote Injury-Free Environment  Flowsheets  Taken 1/10/2021 0246  Safety Promotion/Fall Prevention:  • activity supervised  • assistive device/personal items within reach  • clutter free environment maintained  Taken 1/10/2021 0028  Safety Promotion/Fall Prevention:  • activity supervised  • assistive device/personal items within reach  • clutter free environment maintained  Taken 1/9/2021 2224  Safety Promotion/Fall Prevention:  • activity supervised  • assistive device/personal items within reach  • clutter free environment maintained  Taken 1/9/2021 2018  Safety Promotion/Fall Prevention:  • activity supervised  • assistive device/personal items within reach  • clutter free environment maintained     Problem: Pain Chronic (Persistent) (Comorbidity Management)  Goal: Acceptable Pain Control and Functional Ability  Outcome: Ongoing, Progressing  Intervention: Develop Pain Management Plan  Flowsheets (Taken 1/9/2021  2018)  Pain Management Interventions: quiet environment facilitated  Intervention: Manage Persistent Pain  Flowsheets  Taken 1/10/2021 0339  Bowel Elimination Promotion: commode/bedpan at bedside  Taken 1/10/2021 0246  Medication Review/Management: medications reviewed  Taken 1/10/2021 0028  Medication Review/Management: medications reviewed  Taken 1/9/2021 2224  Medication Review/Management: medications reviewed  Taken 1/9/2021 2018  Bowel Elimination Promotion: commode/bedpan at bedside  Sleep/Rest Enhancement: consistent schedule promoted  Medication Review/Management: medications reviewed  Intervention: Optimize Psychosocial Wellbeing  Flowsheets (Taken 1/9/2021 2018)  Supportive Measures: active listening utilized  Diversional Activities: television  Family/Support System Care: support provided

## 2021-01-11 NOTE — PROGRESS NOTES
Case Management Discharge Note      Final Note: Per notes patient DC'd home         Selected Continued Care - Discharged on 1/10/2021 Admission date: 1/8/2021 - Discharge disposition: Home or Self Care    Destination    No services have been selected for the patient.              Durable Medical Equipment    No services have been selected for the patient.              Dialysis/Infusion    No services have been selected for the patient.              Home Medical Care    No services have been selected for the patient.              Therapy    No services have been selected for the patient.              Community Resources    No services have been selected for the patient.                  Transportation Services  Private: Car    Final Discharge Disposition Code: 01 - home or self-care

## 2021-01-14 LAB
ANDROST SERPL-MCNC: 106 NG/DL (ref 17–99)
SPECIMEN STATUS: NORMAL

## 2021-03-19 ENCOUNTER — BULK ORDERING (OUTPATIENT)
Dept: CASE MANAGEMENT | Facility: OTHER | Age: 66
End: 2021-03-19

## 2021-03-19 DIAGNOSIS — Z23 IMMUNIZATION DUE: ICD-10-CM

## 2021-08-24 DIAGNOSIS — Z79.890 POST-MENOPAUSE ON HRT (HORMONE REPLACEMENT THERAPY): ICD-10-CM

## 2021-08-25 RX ORDER — PROGESTERONE 100 MG/1
CAPSULE ORAL
Qty: 90 CAPSULE | Refills: 3 | Status: SHIPPED | OUTPATIENT
Start: 2021-08-25 | End: 2022-11-10

## 2021-08-31 ENCOUNTER — TELEPHONE (OUTPATIENT)
Dept: CARDIOLOGY | Facility: CLINIC | Age: 66
End: 2021-08-31

## 2021-08-31 DIAGNOSIS — I42.8 NICM (NONISCHEMIC CARDIOMYOPATHY) (HCC): Primary | ICD-10-CM

## 2021-08-31 NOTE — TELEPHONE ENCOUNTER
----- Message from Eden Booker MD sent at 8/30/2021  7:26 PM EDT -----  Regarding: RE: PAT ORDERS  Schedule for an echo  ----- Message -----  From: Mica Fischer MA  Sent: 8/30/2021  12:52 PM EDT  To: Eden Booker MD  Subject: FW: PAT ORDERS                                   Do want to order an echo on her or wait until she sees you, she was last seen in 12/2019  ----- Message -----  From: Lissette López RegSched Rep  Sent: 8/30/2021  12:00 PM EDT  To: Mica Fischer MA  Subject: PAT ORDERS                                       Patient is scheduled for a follow up with Dr. Booker on 10/04/21. States she will need another echocardiogram. Does patient need echo, if so can I please get an order. Thank you

## 2021-09-23 ENCOUNTER — TELEPHONE (OUTPATIENT)
Dept: OBSTETRICS AND GYNECOLOGY | Age: 66
End: 2021-09-23

## 2021-09-23 DIAGNOSIS — Z79.890 POST-MENOPAUSE ON HRT (HORMONE REPLACEMENT THERAPY): ICD-10-CM

## 2021-09-23 RX ORDER — ESTRADIOL 0.07 MG/D
1 FILM, EXTENDED RELEASE TRANSDERMAL 2 TIMES WEEKLY
Qty: 8 PATCH | Refills: 11 | Status: SHIPPED | OUTPATIENT
Start: 2021-09-23 | End: 2022-09-26

## 2021-09-23 NOTE — TELEPHONE ENCOUNTER
PT calls asking for a refill on her estradiol, states she is completely out. Please advise, pharmacy verified first one in chart, next annual exam scheduled for 11/2021 with dr [email or web address]

## 2022-01-02 ENCOUNTER — HOSPITAL ENCOUNTER (EMERGENCY)
Facility: HOSPITAL | Age: 67
Discharge: HOME | End: 2022-01-02
Attending: EMERGENCY MEDICINE | Admitting: EMERGENCY MEDICINE

## 2022-01-02 VITALS
BODY MASS INDEX: 23.54 KG/M2 | HEIGHT: 67 IN | DIASTOLIC BLOOD PRESSURE: 99 MMHG | OXYGEN SATURATION: 95 % | SYSTOLIC BLOOD PRESSURE: 121 MMHG | WEIGHT: 150 LBS | HEART RATE: 90 BPM | TEMPERATURE: 98 F | RESPIRATION RATE: 18 BRPM

## 2022-01-02 DIAGNOSIS — F10.920 ALCOHOLIC INTOXICATION WITHOUT COMPLICATION: Primary | ICD-10-CM

## 2022-01-02 LAB
ALBUMIN SERPL-MCNC: 4.6 G/DL (ref 3.5–5.2)
ALBUMIN/GLOB SERPL: 1.6 G/DL
ALP SERPL-CCNC: 114 U/L (ref 39–117)
ALT SERPL W P-5'-P-CCNC: 51 U/L (ref 1–33)
AMPHET+METHAMPHET UR QL: NEGATIVE
ANION GAP SERPL CALCULATED.3IONS-SCNC: 16.9 MMOL/L (ref 5–15)
AST SERPL-CCNC: 57 U/L (ref 1–32)
BARBITURATES UR QL SCN: NEGATIVE
BASOPHILS # BLD AUTO: 0.09 10*3/MM3 (ref 0–0.2)
BASOPHILS NFR BLD AUTO: 0.9 % (ref 0–1.5)
BENZODIAZ UR QL SCN: NEGATIVE
BILIRUB SERPL-MCNC: 0.4 MG/DL (ref 0–1.2)
BUN SERPL-MCNC: 9 MG/DL (ref 8–23)
BUN/CREAT SERPL: 14.3 (ref 7–25)
CALCIUM SPEC-SCNC: 9.3 MG/DL (ref 8.6–10.5)
CANNABINOIDS SERPL QL: NEGATIVE
CHLORIDE SERPL-SCNC: 99 MMOL/L (ref 98–107)
CO2 SERPL-SCNC: 24.1 MMOL/L (ref 22–29)
COCAINE UR QL: NEGATIVE
CREAT SERPL-MCNC: 0.63 MG/DL (ref 0.57–1)
DEPRECATED RDW RBC AUTO: 51.8 FL (ref 37–54)
EOSINOPHIL # BLD AUTO: 0.08 10*3/MM3 (ref 0–0.4)
EOSINOPHIL NFR BLD AUTO: 0.8 % (ref 0.3–6.2)
ERYTHROCYTE [DISTWIDTH] IN BLOOD BY AUTOMATED COUNT: 13.8 % (ref 12.3–15.4)
ETHANOL BLD-MCNC: 251 MG/DL (ref 0–10)
ETHANOL UR QL: 0.25 %
GFR SERPL CREATININE-BSD FRML MDRD: 95 ML/MIN/1.73
GLOBULIN UR ELPH-MCNC: 2.8 GM/DL
GLUCOSE SERPL-MCNC: 84 MG/DL (ref 65–99)
HCT VFR BLD AUTO: 48.9 % (ref 34–46.6)
HGB BLD-MCNC: 16.5 G/DL (ref 12–15.9)
IMM GRANULOCYTES # BLD AUTO: 0.05 10*3/MM3 (ref 0–0.05)
IMM GRANULOCYTES NFR BLD AUTO: 0.5 % (ref 0–0.5)
LYMPHOCYTES # BLD AUTO: 4.63 10*3/MM3 (ref 0.7–3.1)
LYMPHOCYTES NFR BLD AUTO: 46 % (ref 19.6–45.3)
MAGNESIUM SERPL-MCNC: 2.2 MG/DL (ref 1.6–2.4)
MCH RBC QN AUTO: 34.4 PG (ref 26.6–33)
MCHC RBC AUTO-ENTMCNC: 33.7 G/DL (ref 31.5–35.7)
MCV RBC AUTO: 101.9 FL (ref 79–97)
METHADONE UR QL SCN: NEGATIVE
MONOCYTES # BLD AUTO: 0.66 10*3/MM3 (ref 0.1–0.9)
MONOCYTES NFR BLD AUTO: 6.6 % (ref 5–12)
NEUTROPHILS NFR BLD AUTO: 4.56 10*3/MM3 (ref 1.7–7)
NEUTROPHILS NFR BLD AUTO: 45.2 % (ref 42.7–76)
NRBC BLD AUTO-RTO: 0 /100 WBC (ref 0–0.2)
OPIATES UR QL: NEGATIVE
OXYCODONE UR QL SCN: NEGATIVE
PLATELET # BLD AUTO: 420 10*3/MM3 (ref 140–450)
PMV BLD AUTO: 9.8 FL (ref 6–12)
POTASSIUM SERPL-SCNC: 4.4 MMOL/L (ref 3.5–5.2)
PROT SERPL-MCNC: 7.4 G/DL (ref 6–8.5)
RBC # BLD AUTO: 4.8 10*6/MM3 (ref 3.77–5.28)
SARS-COV-2 RNA RESP QL NAA+PROBE: NOT DETECTED
SODIUM SERPL-SCNC: 140 MMOL/L (ref 136–145)
WBC NRBC COR # BLD: 10.07 10*3/MM3 (ref 3.4–10.8)

## 2022-01-02 PROCEDURE — 80307 DRUG TEST PRSMV CHEM ANLYZR: CPT | Performed by: NURSE PRACTITIONER

## 2022-01-02 PROCEDURE — 80053 COMPREHEN METABOLIC PANEL: CPT | Performed by: NURSE PRACTITIONER

## 2022-01-02 PROCEDURE — 99283 EMERGENCY DEPT VISIT LOW MDM: CPT

## 2022-01-02 PROCEDURE — 36415 COLL VENOUS BLD VENIPUNCTURE: CPT

## 2022-01-02 PROCEDURE — 83735 ASSAY OF MAGNESIUM: CPT | Performed by: NURSE PRACTITIONER

## 2022-01-02 PROCEDURE — U0005 INFEC AGEN DETEC AMPLI PROBE: HCPCS | Performed by: NURSE PRACTITIONER

## 2022-01-02 PROCEDURE — U0003 INFECTIOUS AGENT DETECTION BY NUCLEIC ACID (DNA OR RNA); SEVERE ACUTE RESPIRATORY SYNDROME CORONAVIRUS 2 (SARS-COV-2) (CORONAVIRUS DISEASE [COVID-19]), AMPLIFIED PROBE TECHNIQUE, MAKING USE OF HIGH THROUGHPUT TECHNOLOGIES AS DESCRIBED BY CMS-2020-01-R: HCPCS | Performed by: NURSE PRACTITIONER

## 2022-01-02 PROCEDURE — 82077 ASSAY SPEC XCP UR&BREATH IA: CPT | Performed by: NURSE PRACTITIONER

## 2022-01-02 PROCEDURE — 85025 COMPLETE CBC W/AUTO DIFF WBC: CPT | Performed by: NURSE PRACTITIONER

## 2022-01-02 RX ORDER — SODIUM CHLORIDE 0.9 % (FLUSH) 0.9 %
10 SYRINGE (ML) INJECTION AS NEEDED
Status: DISCONTINUED | OUTPATIENT
Start: 2022-01-02 | End: 2022-01-02 | Stop reason: HOSPADM

## 2022-01-02 NOTE — ED PROVIDER NOTES
EMERGENCY DEPARTMENT ENCOUNTER    Room Number:  03/03  Date seen:  1/6/2022  Time seen: 16:54 EST  PCP: Elizabeth Puente MD  Historian: patient, sister    HPI:  Chief complaint:family concerned about alcohol use  A complete HPI/ROS/PMH/PSH/SH/FH are unobtainable due to: n/a  Context:Victoria H Sturgeon is a 66 y.o. female who presents to the ED because her step daughter phoned 911 due to her being intoxicated in her home.  The patient reports she has not been drinking lately but that today she drank 1/2 bottle wine and some white claws.  Her  is ill and she has been stressed.  She denies feeling depressed or suicidal.  She does not think she has a problem with alcohol and wants to go home.  She denies any physical complaints.     Patient was placed in face mask in first look. Patient was wearing facemask when I entered the room and throughout our encounter. I wore full protective equipment throughout this patient encounter including a N95 face mask, eye shield and gloves. Hand hygiene/washing of hands was performed before donning protective equipment and after removal when leaving the room.    MEDICAL RECORD REVIEW    ALLERGIES  Cefdinir, Latex, and Amoxicillin-pot clavulanate    PAST MEDICAL HISTORY  Active Ambulatory Problems     Diagnosis Date Noted   • Acute renal failure (HCC) 05/03/2016   • Diverticulitis of intestine 05/02/2016   • Hypertension 04/06/2017   • Hyponatremia 05/03/2016   • Macrocytic anemia 05/03/2016   • Metabolic acidosis 05/03/2016   • Vulvitis 04/06/2017   • Dyslipidemia 12/30/2019   • NICM (nonischemic cardiomyopathy) ([facility]) 12/30/2019   • Dense breast tissue on mammogram 01/07/2020   • Lichen sclerosus of female genitalia 07/15/2020   • Alcohol abuse 01/08/2021   • Metabolic encephalopathy 01/08/2021   • Dehydration 01/08/2021     Resolved Ambulatory Problems     Diagnosis Date Noted   • No Resolved Ambulatory Problems     Past Medical History:   Diagnosis Date   • Abnormal  LFTs (liver function tests)    • Abnormality on patient-activated cardiac event recorder    • Acute pain of left foot    • Aphthous ulcer of tongue    • Cardiomyopathy (HCC)    • Cervical radiculitis 2016   • Chest discomfort    • Chondromalacia patellae, right knee 2017   • Colon cancer screening    • Contusion of right knee 2017   • Degeneration of cervical intervertebral disc    • Degenerative joint disease 2016   • Depression 2015   • Encounter for long-term (current) use of high-risk medication    • Encounter for screening colonoscopy    • Folate deficiency    • H/O cancer antigen 125 (CA-125) measurement    • H/O Doppler ultrasound    • History of alcohol abuse 2018   • History of bone density study 2018   • History of cardiomyopathy    • History of Papanicolaou smear of cervix    • HPV test positive    • Hyperlipidemia    • Hypothyroidism    • Internal hemorrhoid    • Menopause    • Neck pain    • Normal coronary arteries    • Osteopenia 2018   • Pharyngeal abscess    • Sinusitis    • Spinal stenosis 2016   • Sprain of sacroiliac joint 2017   • Staphylococcal infection    • Trigeminal neuralgia    • Urge incontinence    • Vitamin B 12 deficiency        PAST SURGICAL HISTORY  Past Surgical History:   Procedure Laterality Date   • CATARACT EXTRACTION Bilateral 2016   •  SECTION      Baby Girl BERNA ([facility] )   • CHOLECYSTECTOMY  1963   • COLONOSCOPY  2014    Diverticulosis sigmoid, internal hemorrhoids   • HERNIA REPAIR      Age 19   • KNEE ARTHROSCOPY INCISION AND DRAINAGE OF KNEE Right  &     after fall   • SINUS SURGERY      X 2 in  and in        FAMILY HISTORY  Family History   Problem Relation Age of Onset   • Lupus Mother         Systemic Lupus Erythematosus (  in her sleep @ 73)   • Leukemia Father         CLL Had it for 14 years.   • Colon polyps Other         Family History   •  Colon cancer Other         Family History   • Other Sister         breast lumpectomy   • No Known Problems Brother    • Scoliosis Daughter    • Heart disease Maternal Grandmother    • Anuerysm Maternal Grandmother 80   • No Known Problems Paternal Grandmother         90's   • Breast cancer Paternal Aunt 78   • No Known Problems Maternal Grandfather    • No Known Problems Paternal Grandfather         90's   • No Known Problems Sister    • Diabetes type II Brother         Diet controlled    • BRCA 1/2 Neg Hx    • Endometrial cancer Neg Hx    • Ovarian cancer Neg Hx        SOCIAL HISTORY  Social History     Socioeconomic History   • Marital status:      Spouse name: CRISTINO   • Number of children: 1   Tobacco Use   • Smoking status: Never Smoker   • Smokeless tobacco: Never Used   Substance and Sexual Activity   • Alcohol use: Yes     Comment: no more than 2-3 drinks in a setting. hx of excessive alc use   • Drug use: No   • Sexual activity: Yes     Partners: Male     Comment: spouse = CRISTINO, with ED.       REVIEW OF SYSTEMS  Review of Systems    All systems reviewed and negative except for those discussed in HPI.     PHYSICAL EXAM    ED Triage Vitals   Temp Heart Rate Resp BP SpO2   01/02/22 1629 01/02/22 1629 01/02/22 1629 01/02/22 1629 01/02/22 1629   98 °F (36.7 °C) 110 18 180/100 100 %      Temp src Heart Rate Source Patient Position BP Location FiO2 (%)   -- 01/02/22 1642 01/02/22 1642 01/02/22 1642 --    Monitor Sitting Left arm      Physical Exam    I have reviewed the triage vital signs and nursing notes.      GENERAL: not distressed  HENT: nares patent, mild slurred speech  EYES: no scleral icterus  NECK: no ROM limitations  CV: regular rhythm, regular rate, no murmur, no rubs, no gallups  RESPIRATORY: normal effort, CTAB  ABDOMEN: soft  : deferred  MUSCULOSKELETAL: no deformity  NEURO: alert, moves all extremities, follows commands  SKIN: warm, dry    LAB RESULTS  Labs Reviewed   COMPREHENSIVE METABOLIC  PANEL - Abnormal; Notable for the following components:       Result Value    ALT (SGPT) 51 (*)     AST (SGOT) 57 (*)     Anion Gap 16.9 (*)     All other components within normal limits    Narrative:     GFR Normal >60  Chronic Kidney Disease <60  Kidney Failure <15     ETHANOL - Abnormal; Notable for the following components:    Ethanol 251 (*)     All other components within normal limits   CBC WITH AUTO DIFFERENTIAL - Abnormal; Notable for the following components:    Hemoglobin 16.5 (*)     Hematocrit 48.9 (*)     .9 (*)     MCH 34.4 (*)     Lymphocyte % 46.0 (*)     Lymphocytes, Absolute 4.63 (*)     All other components within normal limits   COVID-19,[facility] CEPHEID/UDAY, NP SWAB IN TRANSPORT MEDIA 8-12 HR TAT - Normal    Narrative:     Fact sheet for providers: https://www.fda.gov/media/893460/download     Fact sheet for patients: https://www.fda.gov/media/867158/download   URINE DRUG SCREEN - Normal    Narrative:     Negative Thresholds Per Drugs Screened:    Amphetamines                 500 ng/ml  Barbiturates                 200 ng/ml  Benzodiazepines              100 ng/ml  Cocaine                      300 ng/ml  Methadone                    300 ng/ml  Opiates                      300 ng/ml  Oxycodone                    100 ng/ml  THC                           50 ng/ml    The Normal Value for all drugs tested is negative. This report includes final unconfirmed screening results to be used for medical treatment purposes only. Unconfirmed results must not be used for non-medical purposes such as employment or legal testing. Clinical consideration should be applied to any drug of abuse test, particularly when unconfirmed results are used.           MAGNESIUM - Normal   COVID PRE-OP / PRE-PROCEDURE SCREENING ORDER (NO ISOLATION)    Narrative:     The following orders were created for panel order COVID PRE-OP / PRE-PROCEDURE SCREENING ORDER (NO ISOLATION) - Swab, Nasopharynx.  Procedure                                Abnormality         Status                     ---------                               -----------         ------                     COVID-19,[facility]...[[id number]]  Normal              Final result                 Please view results for these tests on the individual orders.   CBC AND DIFFERENTIAL    Narrative:     The following orders were created for panel order CBC & Differential.  Procedure                               Abnormality         Status                     ---------                               -----------         ------                     CBC Auto Differential[[id number]]        Abnormal            Final result                 Please view results for these tests on the individual orders.       RADIOLOGY RESULTS  No Radiology Exams Resulted Within Past 24 Hours       PROGRESS, DATA ANALYSIS, CONSULTS AND MEDICAL DECISION MAKING  All labs have been independently reviewed by me.  All radiology studies have been reviewed by me and discussed with radiologist dictating the report.  EKG's independently viewed and interpreted by me unless stated otherwise. Discussion below represents my analysis of pertinent findings related to patient's condition, differential diagnosis, treatment plan and final disposition.     ED Course as of 01/06/22 1455   Sun Jan 02, 2022   7034 I discussed plan with the patient to check labs including alcohol level and that we would reconvene and discuss these matters when she was clinically sober. Her sister is present and agrees with this plan.   [EW]   1936 Ethanol(!): 251  Will need to wait until she is clinically sober to evaluate.  [EW]   2023 Pt is stating she wants to leave.  I don't know that the sister who is present feels comfortable taking her home.  Discussed with Dr. Haywood.  I have updated patient about the elevated blood alcohol level.  I have also updated her about her  who I admitted.  [EW]   2027 Transfer of care to   "[facility] pending sobriety or discharge time.  [EW]   2058 Pt's sister states she will take her home and stay with her tonight. Pt is able to ambulate with steady gait.  [EW]      ED Course User Index  [EW] Isabela Hurd, JURGEN     DDX: alcohol intoxication, alcohol problem    MDM:  The patient denies that she has a problem with alcohol.  She is not suicidal or homicidal.  She is able to ambulate with steady gait and her sister will take her home and stay the night with her.     Reviewed pt's history and workup with Dr. Haywood.  After a bedside evaluation, Dr. Haywood agrees with the plan of care.    The patient's history, physical exam, and lab findings were discussed with the physician, who also performed a face to face history and physical exam.  I discussed all results and noted any abnormalities with patient.  Discussed absoute need to recheck abnormalities with their family physician.  I answered any of the patient's questions.  Discussed plan for discharge, as there is no emergent indication for admission.  Pt is agreeable and understands need for follow up and repeat testing.  Pt is aware that discharge does not mean that nothing is wrong but it indicates no emergency is present and they must continue care with their family physician.  Pt is discharged with instructions to follow up with primary care doctor to have their blood pressure rechecked.         Disposition vitals:  /99 (BP Location: Right arm, Patient Position: Lying)   Pulse 90   Temp 98 °F (36.7 °C)   Resp 18   Ht 170.2 cm (67\")   Wt 68 kg (150 lb)   SpO2 95%   BMI 23.49 kg/m²       DIAGNOSIS  Final diagnoses:   Alcoholic intoxication without complication (HCC)       FOLLOW UP   Elizabeth Puente MD  [address] [facility] [address]  [facility] [address]  [phone]    Schedule an appointment as soon as possible for a visit in 1 day           Isabela Hurd APRN  01/06/22 2448    "

## 2022-01-02 NOTE — ED TRIAGE NOTES
Pt arrived via ems from home. Pts daughter states pt needs to get help with her ETOH problem. Daughter sent her out due to the fact no one would be home with her. Pt reports drinking 2 White Claws and a half bottle of wine.    Pt was wearing a mask during assessment.  This RN wore appropriate PPE

## 2022-01-03 NOTE — DISCHARGE INSTRUCTIONS
Please follow up with [facility]    Return Precautions    Although you are being discharged from the ED today, I encourage you to return for worsening symptoms.  Things can, and do, change such that treatment at home with medication may not be adequate.      Specifically, return for any of the following:    Chest pain, shortness of breath, pain or nausea and vomiting not controlled by medications provided.    Please make a follow up with your Primary Care Provider for a blood pressure recheck.

## 2022-06-02 NOTE — TELEPHONE ENCOUNTER
LMTC and schedule annual    Patient stated name &   Chief Complaint   Patient presents with    Hypertension     3 months follow up        Health Maintenance Due   Topic    DTaP/Tdap/Td series (1 - Tdap)    Shingrix Vaccine Age 49> (1 of 2)    Pneumococcal 65+ years (1 - PCV)       Wt Readings from Last 3 Encounters:   22 188 lb 12.8 oz (85.6 kg)   22 187 lb (84.8 kg)   21 185 lb (83.9 kg)     Temp Readings from Last 3 Encounters:   22 97.3 °F (36.3 °C) (Temporal)   22 (!) 96.6 °F (35.9 °C) (Skin)   10/22/20 97.3 °F (36.3 °C) (Oral)     BP Readings from Last 3 Encounters:   22 (!) 151/84   22 (!) 148/79   21 (!) 160/89     Pulse Readings from Last 3 Encounters:   22 78   22 70   10/22/20 75         Learning Assessment:  :     Learning Assessment 2021   PRIMARY LEARNER Patient   HIGHEST LEVEL OF EDUCATION - PRIMARY LEARNER  SOME COLLEGE   BARRIERS PRIMARY LEARNER NONE   PRIMARY LANGUAGE ENGLISH   LEARNER PREFERENCE PRIMARY DEMONSTRATION   ANSWERED BY patient   RELATIONSHIP SELF       Depression Screening:  :     3 most recent PHQ Screens 2022   PHQ Not Done -   Little interest or pleasure in doing things Not at all   Feeling down, depressed, irritable, or hopeless Not at all   Total Score PHQ 2 0       Fall Risk Assessment:  :     Fall Risk Assessment, last 12 mths 2022   Able to walk? Yes   Fall in past 12 months? 0   Do you feel unsteady? 0   Are you worried about falling 0       Abuse Screening:  :     Abuse Screening Questionnaire 2022   Do you ever feel afraid of your partner? N N N   Are you in a relationship with someone who physically or mentally threatens you? N N N   Is it safe for you to go home?  Y Y Y       Coordination of Care Questionnaire:  :     1) Have you been to an emergency room, urgent care clinic since your last visit? no   Hospitalized since your last visit? no             2) Have you seen or consulted any other health care providers outside of [address] [place] [address] since your last visit? no  (Include any pap smears or colon screenings in this section.)    3) Do you have an Advance Directive on file? no  Are you interested in receiving information about Advance Directives? no    Patient is accompanied by self I have received verbal consent from Domingo Stevenson to discuss any/all medical information while they are present in the room.

## 2022-06-08 ENCOUNTER — TRANSCRIBE ORDERS (OUTPATIENT)
Dept: ADMINISTRATIVE | Facility: HOSPITAL | Age: 67
End: 2022-06-08

## 2022-06-08 ENCOUNTER — APPOINTMENT (OUTPATIENT)
Dept: GENERAL RADIOLOGY | Facility: HOSPITAL | Age: 67
End: 2022-06-08

## 2022-06-08 ENCOUNTER — HOSPITAL ENCOUNTER (INPATIENT)
Facility: HOSPITAL | Age: 67
LOS: 3 days | Discharge: HOME | End: 2022-06-12
Attending: EMERGENCY MEDICINE | Admitting: HOSPITALIST

## 2022-06-08 DIAGNOSIS — U07.1 CLINICAL DIAGNOSIS OF SEVERE ACUTE RESPIRATORY SYNDROME CORONAVIRUS 2 (SARS-COV-2) DISEASE: Primary | ICD-10-CM

## 2022-06-08 DIAGNOSIS — J18.1 LOBAR PNEUMONIA: ICD-10-CM

## 2022-06-08 DIAGNOSIS — J12.82 PNEUMONIA DUE TO COVID-19 VIRUS: Primary | ICD-10-CM

## 2022-06-08 DIAGNOSIS — U07.1 PNEUMONIA DUE TO COVID-19 VIRUS: Primary | ICD-10-CM

## 2022-06-08 LAB
ALBUMIN SERPL-MCNC: 4.1 G/DL (ref 3.5–5.2)
ALBUMIN/GLOB SERPL: 1.4 G/DL
ALP SERPL-CCNC: 161 U/L (ref 39–117)
ALT SERPL W P-5'-P-CCNC: 52 U/L (ref 1–33)
ANION GAP SERPL CALCULATED.3IONS-SCNC: 14 MMOL/L (ref 5–15)
AST SERPL-CCNC: 49 U/L (ref 1–32)
BASOPHILS # BLD AUTO: 0.02 10*3/MM3 (ref 0–0.2)
BASOPHILS NFR BLD AUTO: 0.2 % (ref 0–1.5)
BILIRUB SERPL-MCNC: 0.7 MG/DL (ref 0–1.2)
BUN SERPL-MCNC: 9 MG/DL (ref 8–23)
BUN/CREAT SERPL: 11.1 (ref 7–25)
CALCIUM SPEC-SCNC: 9 MG/DL (ref 8.6–10.5)
CHLORIDE SERPL-SCNC: 93 MMOL/L (ref 98–107)
CO2 SERPL-SCNC: 23 MMOL/L (ref 22–29)
CREAT SERPL-MCNC: 0.81 MG/DL (ref 0.57–1)
CRP SERPL-MCNC: 3.35 MG/DL (ref 0–0.5)
D-LACTATE SERPL-SCNC: 1 MMOL/L (ref 0.5–2)
DEPRECATED RDW RBC AUTO: 38.6 FL (ref 37–54)
EGFRCR SERPLBLD CKD-EPI 2021: 79.7 ML/MIN/1.73
EOSINOPHIL # BLD AUTO: 0.19 10*3/MM3 (ref 0–0.4)
EOSINOPHIL NFR BLD AUTO: 1.7 % (ref 0.3–6.2)
ERYTHROCYTE [DISTWIDTH] IN BLOOD BY AUTOMATED COUNT: 11.5 % (ref 12.3–15.4)
FERRITIN SERPL-MCNC: 186 NG/ML (ref 13–150)
GLOBULIN UR ELPH-MCNC: 3 GM/DL
GLUCOSE SERPL-MCNC: 149 MG/DL (ref 65–99)
HCT VFR BLD AUTO: 40 % (ref 34–46.6)
HGB BLD-MCNC: 13.9 G/DL (ref 12–15.9)
IMM GRANULOCYTES # BLD AUTO: 0.04 10*3/MM3 (ref 0–0.05)
IMM GRANULOCYTES NFR BLD AUTO: 0.4 % (ref 0–0.5)
LDH SERPL-CCNC: 220 U/L (ref 135–214)
LYMPHOCYTES # BLD AUTO: 1.35 10*3/MM3 (ref 0.7–3.1)
LYMPHOCYTES NFR BLD AUTO: 11.9 % (ref 19.6–45.3)
MCH RBC QN AUTO: 32.6 PG (ref 26.6–33)
MCHC RBC AUTO-ENTMCNC: 34.8 G/DL (ref 31.5–35.7)
MCV RBC AUTO: 93.7 FL (ref 79–97)
MONOCYTES # BLD AUTO: 1.27 10*3/MM3 (ref 0.1–0.9)
MONOCYTES NFR BLD AUTO: 11.2 % (ref 5–12)
NEUTROPHILS NFR BLD AUTO: 74.6 % (ref 42.7–76)
NEUTROPHILS NFR BLD AUTO: 8.51 10*3/MM3 (ref 1.7–7)
NRBC BLD AUTO-RTO: 0.1 /100 WBC (ref 0–0.2)
PLATELET # BLD AUTO: 237 10*3/MM3 (ref 140–450)
PMV BLD AUTO: 9.9 FL (ref 6–12)
POTASSIUM SERPL-SCNC: 4 MMOL/L (ref 3.5–5.2)
PROCALCITONIN SERPL-MCNC: 0.09 NG/ML (ref 0–0.25)
PROT SERPL-MCNC: 7.1 G/DL (ref 6–8.5)
RBC # BLD AUTO: 4.27 10*6/MM3 (ref 3.77–5.28)
SODIUM SERPL-SCNC: 130 MMOL/L (ref 136–145)
WBC NRBC COR # BLD: 11.38 10*3/MM3 (ref 3.4–10.8)

## 2022-06-08 PROCEDURE — 80053 COMPREHEN METABOLIC PANEL: CPT | Performed by: EMERGENCY MEDICINE

## 2022-06-08 PROCEDURE — 94761 N-INVAS EAR/PLS OXIMETRY MLT: CPT

## 2022-06-08 PROCEDURE — G0378 HOSPITAL OBSERVATION PER HR: HCPCS

## 2022-06-08 PROCEDURE — 71045 X-RAY EXAM CHEST 1 VIEW: CPT

## 2022-06-08 PROCEDURE — 94760 N-INVAS EAR/PLS OXIMETRY 1: CPT

## 2022-06-08 PROCEDURE — 85025 COMPLETE CBC W/AUTO DIFF WBC: CPT | Performed by: EMERGENCY MEDICINE

## 2022-06-08 PROCEDURE — 99284 EMERGENCY DEPT VISIT MOD MDM: CPT

## 2022-06-08 PROCEDURE — 84145 PROCALCITONIN (PCT): CPT | Performed by: EMERGENCY MEDICINE

## 2022-06-08 PROCEDURE — 94799 UNLISTED PULMONARY SVC/PX: CPT

## 2022-06-08 PROCEDURE — 94640 AIRWAY INHALATION TREATMENT: CPT

## 2022-06-08 PROCEDURE — 86140 C-REACTIVE PROTEIN: CPT | Performed by: NURSE PRACTITIONER

## 2022-06-08 PROCEDURE — 83615 LACTATE (LD) (LDH) ENZYME: CPT | Performed by: EMERGENCY MEDICINE

## 2022-06-08 PROCEDURE — 82728 ASSAY OF FERRITIN: CPT | Performed by: EMERGENCY MEDICINE

## 2022-06-08 PROCEDURE — 83605 ASSAY OF LACTIC ACID: CPT | Performed by: EMERGENCY MEDICINE

## 2022-06-08 PROCEDURE — 25010000002 ENOXAPARIN PER 10 MG: Performed by: NURSE PRACTITIONER

## 2022-06-08 RX ORDER — SODIUM CHLORIDE 9 MG/ML
30 INJECTION, SOLUTION INTRAVENOUS ONCE
OUTPATIENT
Start: 2022-06-09 | End: 2022-06-09

## 2022-06-08 RX ORDER — BEBTELOVIMAB 87.5 MG/ML
175 INJECTION, SOLUTION INTRAVENOUS ONCE
OUTPATIENT
Start: 2022-06-09 | End: 2022-06-09

## 2022-06-08 RX ORDER — DIPHENHYDRAMINE HYDROCHLORIDE 50 MG/ML
50 INJECTION INTRAMUSCULAR; INTRAVENOUS ONCE AS NEEDED
OUTPATIENT
Start: 2022-06-09

## 2022-06-08 RX ORDER — ONDANSETRON 4 MG/1
4 TABLET, FILM COATED ORAL EVERY 6 HOURS PRN
Status: DISCONTINUED | OUTPATIENT
Start: 2022-06-08 | End: 2022-06-12 | Stop reason: HOSPADM

## 2022-06-08 RX ORDER — ACETAMINOPHEN 325 MG/1
650 TABLET ORAL EVERY 4 HOURS PRN
Status: DISCONTINUED | OUTPATIENT
Start: 2022-06-08 | End: 2022-06-12 | Stop reason: HOSPADM

## 2022-06-08 RX ORDER — METHYLPREDNISOLONE SODIUM SUCCINATE 125 MG/2ML
125 INJECTION, POWDER, LYOPHILIZED, FOR SOLUTION INTRAMUSCULAR; INTRAVENOUS AS NEEDED
OUTPATIENT
Start: 2022-06-09

## 2022-06-08 RX ORDER — SODIUM CHLORIDE 0.9 % (FLUSH) 0.9 %
10 SYRINGE (ML) INJECTION AS NEEDED
Status: DISCONTINUED | OUTPATIENT
Start: 2022-06-08 | End: 2022-06-12 | Stop reason: HOSPADM

## 2022-06-08 RX ORDER — DIPHENHYDRAMINE HCL 50 MG
50 CAPSULE ORAL ONCE AS NEEDED
OUTPATIENT
Start: 2022-06-09

## 2022-06-08 RX ORDER — ONDANSETRON 2 MG/ML
4 INJECTION INTRAMUSCULAR; INTRAVENOUS EVERY 6 HOURS PRN
Status: DISCONTINUED | OUTPATIENT
Start: 2022-06-08 | End: 2022-06-12 | Stop reason: HOSPADM

## 2022-06-08 RX ORDER — ACETAMINOPHEN 650 MG/1
650 SUPPOSITORY RECTAL EVERY 4 HOURS PRN
Status: DISCONTINUED | OUTPATIENT
Start: 2022-06-08 | End: 2022-06-12 | Stop reason: HOSPADM

## 2022-06-08 RX ORDER — IPRATROPIUM BROMIDE AND ALBUTEROL SULFATE 2.5; .5 MG/3ML; MG/3ML
3 SOLUTION RESPIRATORY (INHALATION) ONCE
Status: COMPLETED | OUTPATIENT
Start: 2022-06-08 | End: 2022-06-08

## 2022-06-08 RX ORDER — ALUMINA, MAGNESIA, AND SIMETHICONE 2400; 2400; 240 MG/30ML; MG/30ML; MG/30ML
15 SUSPENSION ORAL EVERY 6 HOURS PRN
Status: DISCONTINUED | OUTPATIENT
Start: 2022-06-08 | End: 2022-06-10

## 2022-06-08 RX ORDER — EPINEPHRINE 1 MG/ML
0.3 INJECTION, SOLUTION, CONCENTRATE INTRAVENOUS AS NEEDED
OUTPATIENT
Start: 2022-06-09

## 2022-06-08 RX ORDER — NITROGLYCERIN 0.4 MG/1
0.4 TABLET SUBLINGUAL
Status: DISCONTINUED | OUTPATIENT
Start: 2022-06-08 | End: 2022-06-09 | Stop reason: SDUPTHER

## 2022-06-08 RX ORDER — ENOXAPARIN SODIUM 100 MG/ML
40 INJECTION SUBCUTANEOUS NIGHTLY
Status: DISCONTINUED | OUTPATIENT
Start: 2022-06-08 | End: 2022-06-12 | Stop reason: HOSPADM

## 2022-06-08 RX ORDER — ACETAMINOPHEN 160 MG/5ML
650 SOLUTION ORAL EVERY 4 HOURS PRN
Status: DISCONTINUED | OUTPATIENT
Start: 2022-06-08 | End: 2022-06-12 | Stop reason: HOSPADM

## 2022-06-08 RX ADMIN — IPRATROPIUM BROMIDE AND ALBUTEROL SULFATE 3 ML: 2.5; .5 SOLUTION RESPIRATORY (INHALATION) at 17:45

## 2022-06-08 RX ADMIN — ENOXAPARIN SODIUM 40 MG: 100 INJECTION SUBCUTANEOUS at 21:45

## 2022-06-08 NOTE — ED PROVIDER NOTES
EMERGENCY DEPARTMENT ENCOUNTER    Room Number:  32/32  Date of encounter:  6/8/2022  PCP: Deana Wilcox MD  Historian: Patient      HPI:  Chief Complaint: Cough, congestion, sore throat  A complete HPI/ROS/PMH/PSH/SH/FH are unobtainable due to: N/A    Context: Victoria H Sturgeon is a 67 y.o. female who presents to the ED c/o cough, congestion and sore throat starting approximately 4 days ago.  She tested positive for COVID-19, and although she has had 2 vaccines.  She has not had a booster.  She has had myalgias and arthralgias.  Her chest hurts from coughing.  Subjective fevers and chills.  She is scheduled for monoclonal antibody infusion tomorrow.      Duration: Approximately 4 days  Onset: Gradual  Timing: Constant  Location: Chest  Radiation: None  Quality: Tightness  Intensity/Severity: Moderate  Progression: Worsening  Associated Symptoms: Cough, fatigue  Aggravating Factors: Cough  Alleviating Factors: Rest  Previous Episodes: No  Treatment before arrival: Over-the-counter meds    The patient was placed in a mask in triage, hand hygiene was performed before and after my interaction with the patient.  I wore a mask, safety glasses and gloves during my entire interaction with the patient.    PAST MEDICAL HISTORY  Active Ambulatory Problems     Diagnosis Date Noted   • Acute renal failure (HCC) 05/03/2016   • Diverticulitis of intestine 05/02/2016   • Hypertension 04/06/2017   • Hyponatremia 05/03/2016   • Macrocytic anemia 05/03/2016   • Metabolic acidosis 05/03/2016   • Vulvitis 04/06/2017   • Dyslipidemia 12/30/2019   • NICM (nonischemic cardiomyopathy) ([facility]) 12/30/2019   • Dense breast tissue on mammogram 01/07/2020   • Lichen sclerosus of female genitalia 07/15/2020   • Alcohol abuse 01/08/2021   • Metabolic encephalopathy 01/08/2021   • Dehydration 01/08/2021   • COVID-19 06/08/2022     Resolved Ambulatory Problems     Diagnosis Date Noted   • No Resolved Ambulatory Problems     Past Medical  History:   Diagnosis Date   • Abnormal LFTs (liver function tests)    • Abnormality on patient-activated cardiac event recorder    • Acute pain of left foot    • Aphthous ulcer of tongue    • Cardiomyopathy (HCC)    • Cervical radiculitis 2016   • Chest discomfort    • Chondromalacia patellae, right knee 2017   • Colon cancer screening    • Contusion of right knee 2017   • Degeneration of cervical intervertebral disc    • Degenerative joint disease 2016   • Depression 2015   • Encounter for long-term (current) use of high-risk medication    • Encounter for screening colonoscopy    • Folate deficiency    • H/O cancer antigen 125 (CA-125) measurement    • H/O Doppler ultrasound    • History of alcohol abuse 2018   • History of bone density study 2018   • History of cardiomyopathy    • History of Papanicolaou smear of cervix    • HPV test positive    • Hyperlipidemia    • Hypothyroidism    • Internal hemorrhoid    • Menopause    • Neck pain    • Normal coronary arteries    • Osteopenia 2018   • Pharyngeal abscess    • Sinusitis    • Spinal stenosis 2016   • Sprain of sacroiliac joint 2017   • Staphylococcal infection    • Trigeminal neuralgia    • Urge incontinence    • Vitamin B 12 deficiency          PAST SURGICAL HISTORY  Past Surgical History:   Procedure Laterality Date   • CATARACT EXTRACTION Bilateral 2016   •  SECTION      Baby Girl BERNA ([facility] [place] )   • CHOLECYSTECTOMY  1963   • COLONOSCOPY  2014    Diverticulosis sigmoid, internal hemorrhoids   • HERNIA REPAIR      Age 19   • KNEE ARTHROSCOPY INCISION AND DRAINAGE OF KNEE Right  &     after fall   • SINUS SURGERY      X 2 in  and in          FAMILY HISTORY  Family History   Problem Relation Age of Onset   • Lupus Mother         Systemic Lupus Erythematosus (  in her sleep @ 73)   • Leukemia Father         CLL Had it for 14 years.   • Colon  polyps Other         Family History   • Colon cancer Other         Family History   • Other Sister         breast lumpectomy   • No Known Problems Brother    • Scoliosis Daughter    • Heart disease Maternal Grandmother    • Anuerysm Maternal Grandmother 80   • No Known Problems Paternal Grandmother         90's   • Breast cancer Paternal Aunt 78   • No Known Problems Maternal Grandfather    • No Known Problems Paternal Grandfather         90's   • No Known Problems Sister    • Diabetes type II Brother         Diet controlled    • BRCA 1/2 Neg Hx    • Endometrial cancer Neg Hx    • Ovarian cancer Neg Hx          SOCIAL HISTORY  Social History     Socioeconomic History   • Marital status:      Spouse name: CRISTINO   • Number of children: 1   Tobacco Use   • Smoking status: Never Smoker   • Smokeless tobacco: Never Used   Substance and Sexual Activity   • Alcohol use: Yes     Comment: no more than 2-3 drinks in a setting. hx of excessive alc use   • Drug use: No   • Sexual activity: Yes     Partners: Male     Comment: spouse = CRISTINO, with ED.         ALLERGIES  Cefdinir, Latex, and Amoxicillin-pot clavulanate        REVIEW OF SYSTEMS  Review of Systems   Constitutional: Positive for fatigue.   HENT: Positive for sore throat.    Respiratory: Positive for cough and chest tightness.    Musculoskeletal: Positive for arthralgias and myalgias.        All systems reviewed and negative except for those discussed in HPI.       PHYSICAL EXAM    I have reviewed the triage vital signs and nursing notes.    ED Triage Vitals   Temp Heart Rate Resp BP SpO2   06/08/22 1618 06/08/22 1618 06/08/22 1618 06/08/22 1716 06/08/22 1618   98.5 °F (36.9 °C) (!) 121 16 (!) 145/109 95 %      Temp src Heart Rate Source Patient Position BP Location FiO2 (%)   06/08/22 1618 06/08/22 1618 -- -- --   Tympanic Monitor          Physical Exam   Constitutional: Pt. is oriented to person, place, and time and well-developed, well-nourished, and in no  distress.   HENT: Normocephalic and atraumatic.   Neck: Normal range of motion. Neck supple. No JVD present.   Cardiovascular: Normal rate, regular rhythm and normal heart sounds. No murmur heard.  Pulmonary/Chest: Effort normal and breath sounds normal. No stridor. No respiratory distress. No wheezes, no rales.  Bibasilar rhonchi are present.  Abdominal: Soft. Bowel sounds are normal. No distension. There is no tenderness. There is no rebound and no guarding.   Musculoskeletal: Normal range of motion. No edema, tenderness or deformity.   Neurological: Pt. is alert and oriented to person, place, and time.  She has no focal neurologic deficits  Skin: Skin is warm and dry. No rash noted. Pt. is not diaphoretic. No erythema.   Psychiatric: Mood, affect and judgment normal.  She is pleasant and cooperative.  Nursing note and vitals reviewed.        LAB RESULTS  Recent Results (from the past 24 hour(s))   Comprehensive Metabolic Panel    Collection Time: 06/08/22  5:51 PM    Specimen: Blood   Result Value Ref Range    Glucose 149 (H) 65 - 99 mg/dL    BUN 9 8 - 23 mg/dL    Creatinine 0.81 0.57 - 1.00 mg/dL    Sodium 130 (L) 136 - 145 mmol/L    Potassium 4.0 3.5 - 5.2 mmol/L    Chloride 93 (L) 98 - 107 mmol/L    CO2 23.0 22.0 - 29.0 mmol/L    Calcium 9.0 8.6 - 10.5 mg/dL    Total Protein 7.1 6.0 - 8.5 g/dL    Albumin 4.10 3.50 - 5.20 g/dL    ALT (SGPT) 52 (H) 1 - 33 U/L    AST (SGOT) 49 (H) 1 - 32 U/L    Alkaline Phosphatase 161 (H) 39 - 117 U/L    Total Bilirubin 0.7 0.0 - 1.2 mg/dL    Globulin 3.0 gm/dL    A/G Ratio 1.4 g/dL    BUN/Creatinine Ratio 11.1 7.0 - 25.0    Anion Gap 14.0 5.0 - 15.0 mmol/L    eGFR 79.7 >60.0 mL/min/1.73   Lactate Dehydrogenase    Collection Time: 06/08/22  5:51 PM    Specimen: Blood   Result Value Ref Range     (H) 135 - 214 U/L   Procalcitonin    Collection Time: 06/08/22  5:51 PM    Specimen: Blood   Result Value Ref Range    Procalcitonin 0.09 0.00 - 0.25 ng/mL   Lactic Acid, Plasma     Collection Time: 06/08/22  5:51 PM    Specimen: Blood   Result Value Ref Range    Lactate 1.0 0.5 - 2.0 mmol/L   Ferritin    Collection Time: 06/08/22  5:51 PM    Specimen: Blood   Result Value Ref Range    Ferritin 186.00 (H) 13.00 - 150.00 ng/mL   CBC Auto Differential    Collection Time: 06/08/22  5:51 PM    Specimen: Blood   Result Value Ref Range    WBC 11.38 (H) 3.40 - 10.80 10*3/mm3    RBC 4.27 3.77 - 5.28 10*6/mm3    Hemoglobin 13.9 12.0 - 15.9 g/dL    Hematocrit 40.0 34.0 - 46.6 %    MCV 93.7 79.0 - 97.0 fL    MCH 32.6 26.6 - 33.0 pg    MCHC 34.8 31.5 - 35.7 g/dL    RDW 11.5 (L) 12.3 - 15.4 %    RDW-SD 38.6 37.0 - 54.0 fl    MPV 9.9 6.0 - 12.0 fL    Platelets 237 140 - 450 10*3/mm3    Neutrophil % 74.6 42.7 - 76.0 %    Lymphocyte % 11.9 (L) 19.6 - 45.3 %    Monocyte % 11.2 5.0 - 12.0 %    Eosinophil % 1.7 0.3 - 6.2 %    Basophil % 0.2 0.0 - 1.5 %    Immature Grans % 0.4 0.0 - 0.5 %    Neutrophils, Absolute 8.51 (H) 1.70 - 7.00 10*3/mm3    Lymphocytes, Absolute 1.35 0.70 - 3.10 10*3/mm3    Monocytes, Absolute 1.27 (H) 0.10 - 0.90 10*3/mm3    Eosinophils, Absolute 0.19 0.00 - 0.40 10*3/mm3    Basophils, Absolute 0.02 0.00 - 0.20 10*3/mm3    Immature Grans, Absolute 0.04 0.00 - 0.05 10*3/mm3    nRBC 0.1 0.0 - 0.2 /100 WBC       Ordered the above labs and independently reviewed the results.        RADIOLOGY  XR Chest AP    Result Date: 6/8/2022  PORTABLE CHEST 06/08/2022 AT 5:59 PM  CLINICAL HISTORY: Preop. Fever. COVID evaluation.  There is fairly confluent abnormal increased density at the left lung base that is producing partial obscuration of the left hemidiaphragm. This is consistent with atelectasis and/or infiltrate in the left lower lobe. The right lung is well expanded and clear. The cardiomediastinal silhouette is unremarkable.  IMPRESSIONS: Focal infiltrate and/or atelectasis at the left lung base.  This report was finalized on 6/8/2022 6:17 PM by Dr. Chris Aguirre M.D.        I ordered the above  noted radiological studies. Reviewed by me and discussed with radiologist.  See dictation for official radiology interpretation.      PROCEDURES    Procedures      MEDICATIONS GIVEN IN ER    Medications   sodium chloride 0.9 % flush 10 mL (has no administration in time range)   acetaminophen (TYLENOL) tablet 650 mg (has no administration in time range)     Or   acetaminophen (TYLENOL) 160 MG/5ML solution 650 mg (has no administration in time range)     Or   acetaminophen (TYLENOL) suppository 650 mg (has no administration in time range)   ondansetron (ZOFRAN) tablet 4 mg (has no administration in time range)     Or   ondansetron (ZOFRAN) injection 4 mg (has no administration in time range)   aluminum-magnesium hydroxide-simethicone (MAALOX MAX) 400-400-40 MG/5ML suspension 15 mL (has no administration in time range)   Enoxaparin Sodium (LOVENOX) syringe 40 mg (has no administration in time range)   ipratropium-albuterol (DUO-NEB) nebulizer solution 3 mL (3 mL Nebulization Given 6/8/22 1745)         PROGRESS, DATA ANALYSIS, CONSULTS, AND MEDICAL DECISION MAKING    Any/all labs have been independently reviewed by me.  Any/all radiology studies have been reviewed by me and discussed with radiologist dictating the report.   EKG's independently viewed and interpreted by me.  Discussion below represents my analysis of pertinent findings related to patient's condition, differential diagnosis, treatment plan and final disposition.    Number of Diagnoses or Management Options     Amount and/or Complexity of Data Reviewed  Clinical lab tests:  Yes  Tests in the radiology section of CPT®:  Yes  Tests in the medicine section of CPT®:  Yes  Review and summarize past medical records:  (See HPI)  Independent visualization of images, tracings, or specimens: (See below)      ED Course as of 06/08/22 1907 Wed Jun 08, 2022   1826 Chest x-ray independently visualized by me and discussed with/interpreted by Dr. Aguirre  (radiology)-there is focal infiltrate/atelectasis at the left lung base.  For official interpretation, see dictated report. [WC]   1831 Patient's oxygen saturation drops to 90% with talking. [WC]   1905 Cussed with JURGEN Lnaier (on-call for [facility])-she accepts the patient for admission to telemetry bed on behalf of Dr. Winn. [WC]      ED Course User Index  [WC] Casey Marquez MD       AS OF 19:07 EDT VITALS:    BP - 145/97  HR - 88  TEMP - 98.5 °F (36.9 °C) (Tympanic)  02 SATS - 99%        DIAGNOSIS  Final diagnoses:   Pneumonia due to COVID-19 virus         DISPOSITION  Observation-telemetry           Casey Marquez MD  06/08/22 1907

## 2022-06-08 NOTE — ED NOTES
Pt had positive covid Monday at [facility]. Pt reports sore throat, cough, congestion that hasn't gone away. Cough worse at night. Pt took zpak from primary care on Monday. Denies fever. Pt sounds hoarse    This RN wore mask and goggles during time of contact

## 2022-06-08 NOTE — ED TRIAGE NOTES
Pt states that she tested positive for COVID on Monday. Reports that she has a cough, sore throat, and congestion. Is supposed to have the infusion tomorrow. Family states that she is a daily drinker and has not had anything to drink today. Pt is showing no S/S of withdrawal.     Patient masked at arrival and triage staff wore all appropriate PPE during entire encounter with patient.

## 2022-06-09 ENCOUNTER — HOSPITAL ENCOUNTER (OUTPATIENT)
Dept: INFUSION THERAPY | Facility: HOSPITAL | Age: 67
Discharge: HOME | End: 2022-06-09

## 2022-06-09 LAB
ALBUMIN SERPL-MCNC: 3.9 G/DL (ref 3.5–5.2)
ALBUMIN SERPL-MCNC: 3.9 G/DL (ref 3.5–5.2)
ALBUMIN/GLOB SERPL: 1.3 G/DL
ALP SERPL-CCNC: 135 U/L (ref 39–117)
ALP SERPL-CCNC: 156 U/L (ref 39–117)
ALT SERPL W P-5'-P-CCNC: 39 U/L (ref 1–33)
ALT SERPL W P-5'-P-CCNC: 47 U/L (ref 1–33)
ANION GAP SERPL CALCULATED.3IONS-SCNC: 13 MMOL/L (ref 5–15)
AST SERPL-CCNC: 39 U/L (ref 1–32)
AST SERPL-CCNC: 47 U/L (ref 1–32)
BASOPHILS # BLD AUTO: 0.02 10*3/MM3 (ref 0–0.2)
BASOPHILS NFR BLD AUTO: 0.2 % (ref 0–1.5)
BILIRUB CONJ SERPL-MCNC: 0.2 MG/DL (ref 0–0.3)
BILIRUB INDIRECT SERPL-MCNC: 0.5 MG/DL
BILIRUB SERPL-MCNC: 0.6 MG/DL (ref 0–1.2)
BILIRUB SERPL-MCNC: 0.7 MG/DL (ref 0–1.2)
BUN SERPL-MCNC: 9 MG/DL (ref 8–23)
BUN/CREAT SERPL: 11.7 (ref 7–25)
CALCIUM SPEC-SCNC: 9.1 MG/DL (ref 8.6–10.5)
CHLORIDE SERPL-SCNC: 95 MMOL/L (ref 98–107)
CO2 SERPL-SCNC: 23 MMOL/L (ref 22–29)
CREAT SERPL-MCNC: 0.56 MG/DL (ref 0.57–1)
CREAT SERPL-MCNC: 0.77 MG/DL (ref 0.57–1)
CRP SERPL-MCNC: 3.8 MG/DL (ref 0–0.5)
D DIMER PPP FEU-MCNC: <0.27 MCGFEU/ML (ref 0–0.49)
DEPRECATED RDW RBC AUTO: 41.6 FL (ref 37–54)
EGFRCR SERPLBLD CKD-EPI 2021: 100.2 ML/MIN/1.73
EGFRCR SERPLBLD CKD-EPI 2021: 84.7 ML/MIN/1.73
EOSINOPHIL # BLD AUTO: 0.26 10*3/MM3 (ref 0–0.4)
EOSINOPHIL NFR BLD AUTO: 2 % (ref 0.3–6.2)
ERYTHROCYTE [DISTWIDTH] IN BLOOD BY AUTOMATED COUNT: 11.6 % (ref 12.3–15.4)
GLOBULIN UR ELPH-MCNC: 3 GM/DL
GLUCOSE SERPL-MCNC: 167 MG/DL (ref 65–99)
HCT VFR BLD AUTO: 42.1 % (ref 34–46.6)
HGB BLD-MCNC: 14.2 G/DL (ref 12–15.9)
IMM GRANULOCYTES # BLD AUTO: 0.04 10*3/MM3 (ref 0–0.05)
IMM GRANULOCYTES NFR BLD AUTO: 0.3 % (ref 0–0.5)
INR PPP: 1.04 (ref 0.9–1.1)
LYMPHOCYTES # BLD AUTO: 1.34 10*3/MM3 (ref 0.7–3.1)
LYMPHOCYTES NFR BLD AUTO: 10.3 % (ref 19.6–45.3)
MCH RBC QN AUTO: 33 PG (ref 26.6–33)
MCHC RBC AUTO-ENTMCNC: 33.7 G/DL (ref 31.5–35.7)
MCV RBC AUTO: 97.9 FL (ref 79–97)
MONOCYTES # BLD AUTO: 1.09 10*3/MM3 (ref 0.1–0.9)
MONOCYTES NFR BLD AUTO: 8.4 % (ref 5–12)
NEUTROPHILS NFR BLD AUTO: 10.23 10*3/MM3 (ref 1.7–7)
NEUTROPHILS NFR BLD AUTO: 78.8 % (ref 42.7–76)
NRBC BLD AUTO-RTO: 0 /100 WBC (ref 0–0.2)
PLATELET # BLD AUTO: 230 10*3/MM3 (ref 140–450)
PMV BLD AUTO: 10.3 FL (ref 6–12)
POTASSIUM SERPL-SCNC: 3.5 MMOL/L (ref 3.5–5.2)
PROCALCITONIN SERPL-MCNC: 0.07 NG/ML (ref 0–0.25)
PROT SERPL-MCNC: 6.6 G/DL (ref 6–8.5)
PROT SERPL-MCNC: 6.9 G/DL (ref 6–8.5)
PROTHROMBIN TIME: 13.5 SECONDS (ref 11.7–14.2)
RBC # BLD AUTO: 4.3 10*6/MM3 (ref 3.77–5.28)
SODIUM SERPL-SCNC: 131 MMOL/L (ref 136–145)
WBC NRBC COR # BLD: 12.98 10*3/MM3 (ref 3.4–10.8)

## 2022-06-09 PROCEDURE — 85610 PROTHROMBIN TIME: CPT | Performed by: NURSE PRACTITIONER

## 2022-06-09 PROCEDURE — 84145 PROCALCITONIN (PCT): CPT | Performed by: NURSE PRACTITIONER

## 2022-06-09 PROCEDURE — 82565 ASSAY OF CREATININE: CPT | Performed by: STUDENT IN AN ORGANIZED HEALTH CARE EDUCATION/TRAINING PROGRAM

## 2022-06-09 PROCEDURE — 85379 FIBRIN DEGRADATION QUANT: CPT | Performed by: NURSE PRACTITIONER

## 2022-06-09 PROCEDURE — 25010000002 REMDESIVIR 100 MG/20ML SOLUTION 1 EACH VIAL: Performed by: STUDENT IN AN ORGANIZED HEALTH CARE EDUCATION/TRAINING PROGRAM

## 2022-06-09 PROCEDURE — 25010000002 ENOXAPARIN PER 10 MG: Performed by: NURSE PRACTITIONER

## 2022-06-09 PROCEDURE — 86140 C-REACTIVE PROTEIN: CPT | Performed by: NURSE PRACTITIONER

## 2022-06-09 PROCEDURE — 85025 COMPLETE CBC W/AUTO DIFF WBC: CPT | Performed by: NURSE PRACTITIONER

## 2022-06-09 PROCEDURE — 80053 COMPREHEN METABOLIC PANEL: CPT | Performed by: NURSE PRACTITIONER

## 2022-06-09 PROCEDURE — 25010000002 ONDANSETRON PER 1 MG: Performed by: NURSE PRACTITIONER

## 2022-06-09 PROCEDURE — 82248 BILIRUBIN DIRECT: CPT | Performed by: NURSE PRACTITIONER

## 2022-06-09 PROCEDURE — 36415 COLL VENOUS BLD VENIPUNCTURE: CPT | Performed by: NURSE PRACTITIONER

## 2022-06-09 RX ORDER — HYDROCODONE BITARTRATE AND ACETAMINOPHEN 5; 325 MG/1; MG/1
1 TABLET ORAL ONCE AS NEEDED
Status: COMPLETED | OUTPATIENT
Start: 2022-06-09 | End: 2022-06-09

## 2022-06-09 RX ORDER — LANOLIN ALCOHOL/MO/W.PET/CERES
50 CREAM (GRAM) TOPICAL DAILY
Status: DISCONTINUED | OUTPATIENT
Start: 2022-06-09 | End: 2022-06-12 | Stop reason: HOSPADM

## 2022-06-09 RX ORDER — MELATONIN
1000 DAILY
Status: DISCONTINUED | OUTPATIENT
Start: 2022-06-09 | End: 2022-06-12 | Stop reason: HOSPADM

## 2022-06-09 RX ORDER — GUAIFENESIN 600 MG/1
600 TABLET, EXTENDED RELEASE ORAL EVERY 12 HOURS SCHEDULED
Status: DISCONTINUED | OUTPATIENT
Start: 2022-06-09 | End: 2022-06-12 | Stop reason: HOSPADM

## 2022-06-09 RX ORDER — OXYBUTYNIN CHLORIDE 10 MG/1
10 TABLET, EXTENDED RELEASE ORAL 2 TIMES DAILY
Status: DISCONTINUED | OUTPATIENT
Start: 2022-06-09 | End: 2022-06-12 | Stop reason: HOSPADM

## 2022-06-09 RX ORDER — ROSUVASTATIN CALCIUM 5 MG/1
5 TABLET, COATED ORAL DAILY
Status: DISCONTINUED | OUTPATIENT
Start: 2022-06-09 | End: 2022-06-12 | Stop reason: HOSPADM

## 2022-06-09 RX ORDER — GUAIFENESIN AND DEXTROMETHORPHAN HYDROBROMIDE 600; 30 MG/1; MG/1
1 TABLET, EXTENDED RELEASE ORAL 2 TIMES DAILY PRN
Status: DISCONTINUED | OUTPATIENT
Start: 2022-06-09 | End: 2022-06-12 | Stop reason: HOSPADM

## 2022-06-09 RX ORDER — BUDESONIDE AND FORMOTEROL FUMARATE DIHYDRATE 80; 4.5 UG/1; UG/1
2 AEROSOL RESPIRATORY (INHALATION)
Refills: 2 | Status: DISCONTINUED | OUTPATIENT
Start: 2022-06-09 | End: 2022-06-12 | Stop reason: HOSPADM

## 2022-06-09 RX ORDER — FOLIC ACID 1 MG/1
1 TABLET ORAL DAILY
Status: DISCONTINUED | OUTPATIENT
Start: 2022-06-09 | End: 2022-06-12 | Stop reason: HOSPADM

## 2022-06-09 RX ORDER — NITROGLYCERIN 0.4 MG/1
0.4 TABLET SUBLINGUAL
Status: DISCONTINUED | OUTPATIENT
Start: 2022-06-09 | End: 2022-06-12 | Stop reason: HOSPADM

## 2022-06-09 RX ORDER — CARVEDILOL 12.5 MG/1
12.5 TABLET ORAL 2 TIMES DAILY
Status: DISCONTINUED | OUTPATIENT
Start: 2022-06-09 | End: 2022-06-12 | Stop reason: HOSPADM

## 2022-06-09 RX ORDER — OXYBUTYNIN CHLORIDE 10 MG/1
10 TABLET, EXTENDED RELEASE ORAL 2 TIMES DAILY
COMMUNITY

## 2022-06-09 RX ORDER — ZOLPIDEM TARTRATE 5 MG/1
5 TABLET ORAL ONCE
Status: COMPLETED | OUTPATIENT
Start: 2022-06-09 | End: 2022-06-09

## 2022-06-09 RX ORDER — UREA 10 %
3 LOTION (ML) TOPICAL NIGHTLY PRN
Status: DISCONTINUED | OUTPATIENT
Start: 2022-06-09 | End: 2022-06-12 | Stop reason: HOSPADM

## 2022-06-09 RX ORDER — ASCORBIC ACID 500 MG
500 TABLET ORAL DAILY
Status: DISCONTINUED | OUTPATIENT
Start: 2022-06-09 | End: 2022-06-12 | Stop reason: HOSPADM

## 2022-06-09 RX ORDER — ZINC SULFATE 50(220)MG
220 CAPSULE ORAL DAILY
Status: DISCONTINUED | OUTPATIENT
Start: 2022-06-09 | End: 2022-06-12 | Stop reason: HOSPADM

## 2022-06-09 RX ORDER — LIOTHYRONINE SODIUM 5 UG/1
15 TABLET ORAL DAILY
Status: DISCONTINUED | OUTPATIENT
Start: 2022-06-09 | End: 2022-06-12 | Stop reason: HOSPADM

## 2022-06-09 RX ORDER — DULOXETIN HYDROCHLORIDE 20 MG/1
20 CAPSULE, DELAYED RELEASE ORAL DAILY
Status: DISCONTINUED | OUTPATIENT
Start: 2022-06-09 | End: 2022-06-12 | Stop reason: HOSPADM

## 2022-06-09 RX ORDER — HYDROCODONE BITARTRATE AND ACETAMINOPHEN 7.5; 325 MG/1; MG/1
1 TABLET ORAL EVERY 4 HOURS PRN
Status: DISCONTINUED | OUTPATIENT
Start: 2022-06-09 | End: 2022-06-12 | Stop reason: HOSPADM

## 2022-06-09 RX ADMIN — HYDROCODONE BITARTRATE AND ACETAMINOPHEN 1 TABLET: 7.5; 325 TABLET ORAL at 18:38

## 2022-06-09 RX ADMIN — Medication 1000 UNITS: at 18:30

## 2022-06-09 RX ADMIN — DULOXETINE HYDROCHLORIDE 20 MG: 20 CAPSULE, DELAYED RELEASE ORAL at 14:26

## 2022-06-09 RX ADMIN — ZOLPIDEM TARTRATE 5 MG: 5 TABLET ORAL at 01:45

## 2022-06-09 RX ADMIN — CARVEDILOL 12.5 MG: 12.5 TABLET, FILM COATED ORAL at 21:26

## 2022-06-09 RX ADMIN — OXYBUTYNIN CHLORIDE 10 MG: 10 TABLET, EXTENDED RELEASE ORAL at 21:26

## 2022-06-09 RX ADMIN — HYDROCODONE BITARTRATE AND ACETAMINOPHEN 1 TABLET: 7.5; 325 TABLET ORAL at 11:11

## 2022-06-09 RX ADMIN — HYDROCODONE BITARTRATE AND ACETAMINOPHEN 1 TABLET: 7.5; 325 TABLET ORAL at 14:38

## 2022-06-09 RX ADMIN — HYDROCODONE BITARTRATE AND ACETAMINOPHEN 1 TABLET: 5; 325 TABLET ORAL at 01:45

## 2022-06-09 RX ADMIN — Medication 10 ML: at 20:19

## 2022-06-09 RX ADMIN — GUAIFENESIN 600 MG: 600 TABLET, EXTENDED RELEASE ORAL at 14:26

## 2022-06-09 RX ADMIN — REMDESIVIR 200 MG: 100 INJECTION, POWDER, LYOPHILIZED, FOR SOLUTION INTRAVENOUS at 14:26

## 2022-06-09 RX ADMIN — ROSUVASTATIN CALCIUM 5 MG: 5 TABLET, FILM COATED ORAL at 14:26

## 2022-06-09 RX ADMIN — LIOTHYRONINE SODIUM 15 MCG: 5 TABLET ORAL at 14:26

## 2022-06-09 RX ADMIN — Medication 3 MG: at 23:34

## 2022-06-09 RX ADMIN — GUAIFENESIN 600 MG: 600 TABLET, EXTENDED RELEASE ORAL at 21:26

## 2022-06-09 RX ADMIN — FOLIC ACID 1 MG: 1 TABLET ORAL at 14:26

## 2022-06-09 RX ADMIN — Medication 220 MG: at 18:30

## 2022-06-09 RX ADMIN — Medication 50 MG: at 18:30

## 2022-06-09 RX ADMIN — OXYBUTYNIN CHLORIDE 10 MG: 10 TABLET, EXTENDED RELEASE ORAL at 14:26

## 2022-06-09 RX ADMIN — ENOXAPARIN SODIUM 40 MG: 100 INJECTION SUBCUTANEOUS at 20:19

## 2022-06-09 RX ADMIN — CARVEDILOL 12.5 MG: 12.5 TABLET, FILM COATED ORAL at 14:26

## 2022-06-09 RX ADMIN — HYDROCODONE BITARTRATE AND ACETAMINOPHEN 1 TABLET: 7.5; 325 TABLET ORAL at 22:57

## 2022-06-09 RX ADMIN — ACETAMINOPHEN 650 MG: 325 TABLET ORAL at 10:10

## 2022-06-09 RX ADMIN — ONDANSETRON 4 MG: 2 INJECTION INTRAMUSCULAR; INTRAVENOUS at 20:08

## 2022-06-09 RX ADMIN — OXYCODONE HYDROCHLORIDE AND ACETAMINOPHEN 500 MG: 500 TABLET ORAL at 18:30
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
## 2022-06-09 NOTE — PROGRESS NOTES
"[facility] Clinical Pharmacy Services: Enoxaparin Consult    Victoria H Sturgeon has a pharmacy consult to dose prophylactic enoxaparin per Dr. Bright's request.     Indication: VTE Prophylaxis  Home Anticoagulation: none    Relevant clinical data and objective history reviewed:  67 y.o. female 172.7 cm (68\") 64.8 kg (142 lb 12.8 oz)   Body mass index is 21.71 kg/m².   Results from last 7 days   Lab Units 06/09/22  0025   PLATELETS 10*3/mm3 230     Estimated Creatinine Clearance: 72.5 mL/min (by C-G formula based on SCr of 0.77 mg/dL).    Assessment/Plan    Patient is already on 40mg subcutaneous every 24 hours so will continue same tonight (Last dose was at 2145 on 6/8/22) Consult order will be discontinued but pharmacy will continue to follow.     Fortino Young, [facility]  Clinical Pharmacist   "

## 2022-06-09 NOTE — PROGRESS NOTES
[facility]  Clinical Pharmacy Department     Remdesivir Review Note    Victoria H Sturgeon is a 67 y.o. female with confirmed COVID-19 infection on day 2 of hospitalization.     Consulting Provider: Kaila  Date of Confirmed SARS-CoV-2: 6/6/22  Date of Symptom Onset: 6/4/22  Other Antimicrobials: none  Hydroxychloroquine or chloroquine prior to arrival: none    Allergies  Allergies as of 06/08/2022 - Reviewed 06/08/2022   Allergen Reaction Noted    Cefdinir Anaphylaxis 05/02/2016    Latex Anaphylaxis 05/02/2016    Amoxicillin-pot clavulanate Nausea And Vomiting 09/29/2016       Microbiology:  Microbiology Results (last 10 days)       Procedure Component Value - Date/Time    COVID-19,[facility] ROUTINE, NP/OP SWAB IN TRANSPORT MEDIA OR ESWAB 72 HR TAT - , [[id number]]  (Abnormal) Collected: 06/06/22 1511    Lab Status: Final result Updated: 06/08/22 1617     SARS-CoV-2, AKUA Detected     Comment: Patients who have a positive COVID-19 test result may now have  treatment options. Treatment options are available for patients  with mild to moderate symptoms and for hospitalized patients.  Visit our website at [email or web address] for  resources and information.  This nucleic acid amplification test was developed and its performance  characteristics determined by [facility]. Nucleic acid  amplification tests include RT-PCR and TMA. This test has not been  FDA cleared or approved. This test has been authorized by FDA under  an Emergency Use Authorization (EUA). This test is only authorized  for the duration of time the declaration that circumstances exist  justifying the authorization of the emergency use of in vitro  diagnostic tests for detection of SARS-CoV-2 virus and/or diagnosis  of COVID-19 infection under section 564(b)(1) of the Act, 21 U.S.C.  360bbb-3(b) (1), unless the authorization is terminated or revoked  sooner.  When diagnostic testing is negative, the possibility of a false  negative  "result should be considered in the context of a patient's  recent exposures and the presence of clinical signs and symptoms  consistent with COVID-19. An individual without symptoms of COVID-19  and who is not shedding SARS-CoV-2 virus would expect to have a  negative (not detected) result in this assay.       Narrative:      Performed at:  [address] [place] [address]  [id number]  : Roberto Arenas PhD, Phone:  [phone]            Vitals/Labs/I&O  Visit Vitals  /84 (BP Location: Right arm, Patient Position: Lying)   Pulse 111   Temp 98.6 °F (37 °C) (Oral)   Resp 20   Ht 172.7 cm (68\")   Wt 64.8 kg (142 lb 12.8 oz)   SpO2 97%   BMI 21.71 kg/m²       Results from last 7 days   Lab Units 06/09/22  0025 06/08/22  1751   WBC 10*3/mm3 12.98* 11.38*     Results from last 7 days   Lab Units 06/09/22  0025 06/08/22  1751   PROCALCITONIN ng/mL 0.07 0.09     Results from last 7 days   Lab Units 06/09/22  0025 06/08/22  1751   AST (SGOT) U/L 47* 49*      Results from last 7 days   Lab Units 06/09/22  0025 06/08/22  1751   ALT (SGPT) U/L 47* 52*       Estimated Creatinine Clearance: 72.5 mL/min (by C-G formula based on SCr of 0.77 mg/dL).  Results from last 7 days   Lab Units 06/09/22  0025 06/08/22  1751   BUN mg/dL 9 9   CREATININE mg/dL 0.77 0.81     Intake & Output (last 3 days)       None            Assessment/Plan:    Patient meets the following inclusion/exclusion criteria:  Patient is hospitalized with confirmed COVID-19 infection (and accompanying symptoms)  Patient meets one of the two below criteria:  Patient is requiring an increase in baseline supplemental oxygen requirements OR SpO2 </= 94% on room air secondary to COVID-19 infection OR  Patient is symptomatic but not requiring supplemental oxygen or an increase in baseline oxygen AND has at least one of the below high risk criteria for progression to severe illness. High risk criteria:   Age >/= 65  Cancer  Cardiovascular " diseases (HF, CAD, or cardiomyopathies)  CKD  Chronic lung disease (COPD, CF, interstitial lung disease, PE, pulmonary hypertension, bronchopulmonary dysplasia, bronchiectasis)  Diabetes mellitis (insulin dependent or poorly controlled)  Immunocompromising conditions or receipt of immunosuppressive medications  Obesity (BMI > 35 kg/m2)  Pregnancy and recent pregnancy (within 7 days of delivery)  Sickle cell disease  Baseline and daily LFTs and Scr have been ordered prior to remdesivir initiation  ALT is not ? 10 times the upper limit of normal  Patient is not on concomitant hydroxychloroquine or chloroquine  Patient does not require invasive mechanical ventilation (IMV) or ECMO  Patient is within 10 days from symptom onset (for criteria 2a) or within 7 days of symptom onset (for criteria 2b)    Remdesivir 200 mg IV x 1 dose, followed by 100 mg IV q24h for 2 days or until hospital discharge (whichever comes first) has been ordered.    Thank you for involving pharmacy in this patient's care. Please contact pharmacy with any questions or concerns.                           Fortino Young [facility]  Clinical Pharmacist

## 2022-06-10 ENCOUNTER — APPOINTMENT (OUTPATIENT)
Dept: CT IMAGING | Facility: HOSPITAL | Age: 67
End: 2022-06-10

## 2022-06-10 ENCOUNTER — APPOINTMENT (OUTPATIENT)
Dept: INFUSION THERAPY | Facility: HOSPITAL | Age: 67
End: 2022-06-10

## 2022-06-10 LAB
ALBUMIN SERPL-MCNC: 3.7 G/DL (ref 3.5–5.2)
ALBUMIN/GLOB SERPL: 1.3 G/DL
ALP SERPL-CCNC: 119 U/L (ref 39–117)
ALT SERPL W P-5'-P-CCNC: 33 U/L (ref 1–33)
ANION GAP SERPL CALCULATED.3IONS-SCNC: 12.7 MMOL/L (ref 5–15)
AST SERPL-CCNC: 35 U/L (ref 1–32)
BASOPHILS # BLD AUTO: 0.05 10*3/MM3 (ref 0–0.2)
BASOPHILS NFR BLD AUTO: 0.3 % (ref 0–1.5)
BILIRUB CONJ SERPL-MCNC: 0.3 MG/DL (ref 0–0.3)
BILIRUB SERPL-MCNC: 0.7 MG/DL (ref 0–1.2)
BUN SERPL-MCNC: 5 MG/DL (ref 8–23)
BUN/CREAT SERPL: 11.6 (ref 7–25)
CALCIUM SPEC-SCNC: 8.4 MG/DL (ref 8.6–10.5)
CHLORIDE SERPL-SCNC: 92 MMOL/L (ref 98–107)
CO2 SERPL-SCNC: 22.3 MMOL/L (ref 22–29)
CREAT SERPL-MCNC: 0.43 MG/DL (ref 0.57–1)
CRP SERPL-MCNC: 11.71 MG/DL (ref 0–0.5)
DEPRECATED RDW RBC AUTO: 39.2 FL (ref 37–54)
EGFRCR SERPLBLD CKD-EPI 2021: 106.8 ML/MIN/1.73
EOSINOPHIL # BLD AUTO: 0.27 10*3/MM3 (ref 0–0.4)
EOSINOPHIL NFR BLD AUTO: 1.8 % (ref 0.3–6.2)
ERYTHROCYTE [DISTWIDTH] IN BLOOD BY AUTOMATED COUNT: 11.6 % (ref 12.3–15.4)
GLOBULIN UR ELPH-MCNC: 2.8 GM/DL
GLUCOSE SERPL-MCNC: 121 MG/DL (ref 65–99)
HCT VFR BLD AUTO: 37 % (ref 34–46.6)
HGB BLD-MCNC: 12.9 G/DL (ref 12–15.9)
IMM GRANULOCYTES # BLD AUTO: 0.1 10*3/MM3 (ref 0–0.05)
IMM GRANULOCYTES NFR BLD AUTO: 0.7 % (ref 0–0.5)
LYMPHOCYTES # BLD AUTO: 1.81 10*3/MM3 (ref 0.7–3.1)
LYMPHOCYTES NFR BLD AUTO: 12.1 % (ref 19.6–45.3)
MCH RBC QN AUTO: 32.7 PG (ref 26.6–33)
MCHC RBC AUTO-ENTMCNC: 34.9 G/DL (ref 31.5–35.7)
MCV RBC AUTO: 93.7 FL (ref 79–97)
MONOCYTES # BLD AUTO: 1.59 10*3/MM3 (ref 0.1–0.9)
MONOCYTES NFR BLD AUTO: 10.6 % (ref 5–12)
NEUTROPHILS NFR BLD AUTO: 11.14 10*3/MM3 (ref 1.7–7)
NEUTROPHILS NFR BLD AUTO: 74.5 % (ref 42.7–76)
NRBC BLD AUTO-RTO: 0 /100 WBC (ref 0–0.2)
PLATELET # BLD AUTO: 201 10*3/MM3 (ref 140–450)
PMV BLD AUTO: 10.5 FL (ref 6–12)
POTASSIUM SERPL-SCNC: 2.9 MMOL/L (ref 3.5–5.2)
PROT SERPL-MCNC: 6.5 G/DL (ref 6–8.5)
RBC # BLD AUTO: 3.95 10*6/MM3 (ref 3.77–5.28)
SODIUM SERPL-SCNC: 127 MMOL/L (ref 136–145)
WBC NRBC COR # BLD: 14.96 10*3/MM3 (ref 3.4–10.8)

## 2022-06-10 PROCEDURE — 71250 CT THORAX DX C-: CPT

## 2022-06-10 PROCEDURE — 86140 C-REACTIVE PROTEIN: CPT | Performed by: NURSE PRACTITIONER

## 2022-06-10 PROCEDURE — 80053 COMPREHEN METABOLIC PANEL: CPT | Performed by: NURSE PRACTITIONER

## 2022-06-10 PROCEDURE — 25010000002 REMDESIVIR 100 MG/20ML SOLUTION 1 EACH VIAL: Performed by: STUDENT IN AN ORGANIZED HEALTH CARE EDUCATION/TRAINING PROGRAM

## 2022-06-10 PROCEDURE — 84132 ASSAY OF SERUM POTASSIUM: CPT | Performed by: STUDENT IN AN ORGANIZED HEALTH CARE EDUCATION/TRAINING PROGRAM

## 2022-06-10 PROCEDURE — 94664 DEMO&/EVAL PT USE INHALER: CPT

## 2022-06-10 PROCEDURE — 36415 COLL VENOUS BLD VENIPUNCTURE: CPT | Performed by: STUDENT IN AN ORGANIZED HEALTH CARE EDUCATION/TRAINING PROGRAM

## 2022-06-10 PROCEDURE — 25010000002 ENOXAPARIN PER 10 MG: Performed by: NURSE PRACTITIONER

## 2022-06-10 PROCEDURE — 82248 BILIRUBIN DIRECT: CPT | Performed by: STUDENT IN AN ORGANIZED HEALTH CARE EDUCATION/TRAINING PROGRAM

## 2022-06-10 PROCEDURE — 85025 COMPLETE CBC W/AUTO DIFF WBC: CPT | Performed by: NURSE PRACTITIONER

## 2022-06-10 PROCEDURE — 25010000002 ONDANSETRON PER 1 MG: Performed by: NURSE PRACTITIONER

## 2022-06-10 PROCEDURE — 94799 UNLISTED PULMONARY SVC/PX: CPT

## 2022-06-10 RX ORDER — LEVOFLOXACIN 750 MG/1
750 TABLET ORAL EVERY 24 HOURS
Status: DISCONTINUED | OUTPATIENT
Start: 2022-06-10 | End: 2022-06-11

## 2022-06-10 RX ORDER — SODIUM CHLORIDE 9 MG/ML
100 INJECTION, SOLUTION INTRAVENOUS CONTINUOUS
Status: DISCONTINUED | OUTPATIENT
Start: 2022-06-10 | End: 2022-06-12 | Stop reason: HOSPADM

## 2022-06-10 RX ORDER — IPRATROPIUM BROMIDE AND ALBUTEROL SULFATE 2.5; .5 MG/3ML; MG/3ML
3 SOLUTION RESPIRATORY (INHALATION) EVERY 6 HOURS PRN
Status: DISCONTINUED | OUTPATIENT
Start: 2022-06-10 | End: 2022-06-12 | Stop reason: HOSPADM

## 2022-06-10 RX ORDER — MAGNESIUM SULFATE HEPTAHYDRATE 40 MG/ML
2 INJECTION, SOLUTION INTRAVENOUS AS NEEDED
Status: DISCONTINUED | OUTPATIENT
Start: 2022-06-10 | End: 2022-06-12 | Stop reason: HOSPADM

## 2022-06-10 RX ORDER — MAGNESIUM SULFATE HEPTAHYDRATE 40 MG/ML
4 INJECTION, SOLUTION INTRAVENOUS AS NEEDED
Status: DISCONTINUED | OUTPATIENT
Start: 2022-06-10 | End: 2022-06-12 | Stop reason: HOSPADM

## 2022-06-10 RX ORDER — POTASSIUM CHLORIDE 750 MG/1
40 TABLET, FILM COATED, EXTENDED RELEASE ORAL AS NEEDED
Status: DISCONTINUED | OUTPATIENT
Start: 2022-06-10 | End: 2022-06-12 | Stop reason: HOSPADM

## 2022-06-10 RX ORDER — POTASSIUM CHLORIDE 1.5 G/1.77G
40 POWDER, FOR SOLUTION ORAL AS NEEDED
Status: DISCONTINUED | OUTPATIENT
Start: 2022-06-10 | End: 2022-06-12 | Stop reason: HOSPADM

## 2022-06-10 RX ADMIN — CARVEDILOL 12.5 MG: 12.5 TABLET, FILM COATED ORAL at 20:12

## 2022-06-10 RX ADMIN — IPRATROPIUM BROMIDE AND ALBUTEROL SULFATE 3 ML: 2.5; .5 SOLUTION RESPIRATORY (INHALATION) at 07:31

## 2022-06-10 RX ADMIN — ENOXAPARIN SODIUM 40 MG: 100 INJECTION SUBCUTANEOUS at 20:12

## 2022-06-10 RX ADMIN — OXYBUTYNIN CHLORIDE 10 MG: 10 TABLET, EXTENDED RELEASE ORAL at 09:49

## 2022-06-10 RX ADMIN — ONDANSETRON 4 MG: 2 INJECTION INTRAMUSCULAR; INTRAVENOUS at 12:11

## 2022-06-10 RX ADMIN — LIOTHYRONINE SODIUM 15 MCG: 5 TABLET ORAL at 09:49

## 2022-06-10 RX ADMIN — DULOXETINE HYDROCHLORIDE 20 MG: 20 CAPSULE, DELAYED RELEASE ORAL at 09:49

## 2022-06-10 RX ADMIN — OXYBUTYNIN CHLORIDE 10 MG: 10 TABLET, EXTENDED RELEASE ORAL at 20:12

## 2022-06-10 RX ADMIN — REMDESIVIR 100 MG: 100 INJECTION, POWDER, LYOPHILIZED, FOR SOLUTION INTRAVENOUS at 13:42

## 2022-06-10 RX ADMIN — LEVOFLOXACIN 750 MG: 750 TABLET, FILM COATED ORAL at 12:07

## 2022-06-10 RX ADMIN — HYDROCODONE BITARTRATE AND ACETAMINOPHEN 1 TABLET: 7.5; 325 TABLET ORAL at 20:12

## 2022-06-10 RX ADMIN — ROSUVASTATIN CALCIUM 5 MG: 5 TABLET, FILM COATED ORAL at 09:50

## 2022-06-10 RX ADMIN — GUAIFENESIN AND DEXTROMETHORPHAN HYDROBROMIDE 1 TABLET: 600; 30 TABLET, EXTENDED RELEASE ORAL at 03:46

## 2022-06-10 RX ADMIN — HYDROCODONE BITARTRATE AND ACETAMINOPHEN 1 TABLET: 7.5; 325 TABLET ORAL at 06:45

## 2022-06-10 RX ADMIN — GUAIFENESIN 600 MG: 600 TABLET, EXTENDED RELEASE ORAL at 09:49

## 2022-06-10 RX ADMIN — ONDANSETRON 4 MG: 2 INJECTION INTRAMUSCULAR; INTRAVENOUS at 21:26

## 2022-06-10 RX ADMIN — POTASSIUM CHLORIDE 40 MEQ: 750 TABLET, EXTENDED RELEASE ORAL at 12:06

## 2022-06-10 RX ADMIN — HYDROCODONE BITARTRATE AND ACETAMINOPHEN 1 TABLET: 7.5; 325 TABLET ORAL at 12:07

## 2022-06-10 RX ADMIN — CARVEDILOL 12.5 MG: 12.5 TABLET, FILM COATED ORAL at 09:49

## 2022-06-10 RX ADMIN — HYDROCODONE BITARTRATE AND ACETAMINOPHEN 1 TABLET: 7.5; 325 TABLET ORAL at 16:04

## 2022-06-10 RX ADMIN — Medication 10 ML: at 20:12

## 2022-06-10 RX ADMIN — POTASSIUM CHLORIDE 40 MEQ: 750 TABLET, EXTENDED RELEASE ORAL at 16:04

## 2022-06-10 RX ADMIN — GUAIFENESIN 600 MG: 600 TABLET, EXTENDED RELEASE ORAL at 20:12

## 2022-06-10 RX ADMIN — SODIUM CHLORIDE 100 ML/HR: 9 INJECTION, SOLUTION INTRAVENOUS at 13:41

## 2022-06-10 RX ADMIN — POTASSIUM CHLORIDE 40 MEQ: 750 TABLET, EXTENDED RELEASE ORAL at 20:12

## 2022-06-10 NOTE — NURSING NOTE
DENNIS paged for positive sepsis screen and requesting nightly ambien medication pt takes at home    Per UJRGEN Brewer LHKENDRA, pt has not had this medication filled since 11-16-21 per her RUBA. Melatonin ordered for now, pt can speak with attending MD in morning.

## 2022-06-10 NOTE — PROGRESS NOTES
Name: Victoria H Sturgeon ADMIT: 2022   : 1955  PCP: Deana Wilcox MD    MRN: [id number] LOS: 1 days   AGE/SEX: 67 y.o. female  ROOM: [facility]     Subjective   Subjective   Remains on RA. CRP is pretty elevated.   Slept poorly last night    Review of Systems    GENERAL: No fevers, chills, sweats.    RESPIRATORY: No cough, shortness of breath, hemoptysis or wheezing.   CVS: No chest pain, palpitations, orthopnea, dyspnea on exertion   GI: No melena or hematochezia. No abdominal pain. No nausea, vomiting, constipation, diarrhea      Objective   Objective   Vital Signs  Temp:  [99 °F (37.2 °C)-100.7 °F (38.2 °C)] 100 °F (37.8 °C)  Heart Rate:  [] 91  Resp:  [20] 20  BP: (127-159)/(76-95) 137/86  SpO2:  [92 %-98 %] 94 %  on   ;   Device (Oxygen Therapy): room air  Body mass index is 21.71 kg/m².  Physical Exam    General: Alert and oriented x3, no acute distress  HEENT: Normocephalic, atraumatic  CV: Regular rate and rhythm, no murmurs rubs or gallops  Lungs: Clear to auscultation bilaterally, no crackles or wheezes  Abdomen: Soft, nontender, nondistended  Neuro: CN II-XII intact, no FND appreciated     Results Review     I reviewed the patient's new clinical results.  Results from last 7 days   Lab Units 06/10/22  0817 22  0025 22  1751   WBC 10*3/mm3 14.96* 12.98* 11.38*   HEMOGLOBIN g/dL 12.9 14.2 13.9   PLATELETS 10*3/mm3 201 230 237     Results from last 7 days   Lab Units 06/10/22  0817 22  1156 22  0025 22  1751   SODIUM mmol/L 127*  --  131* 130*   POTASSIUM mmol/L 2.9*  --  3.5 4.0   CHLORIDE mmol/L 92*  --  95* 93*   CO2 mmol/L 22.3  --  23.0 23.0   BUN mg/dL 5*  --  9 9   CREATININE mg/dL 0.43* 0.56* 0.77 0.81   GLUCOSE mg/dL 121*  --  167* 149*   Estimated Creatinine Clearance: 129.9 mL/min (A) (by C-G formula based on SCr of 0.43 mg/dL (L)).  Results from last 7 days   Lab Units 06/10/22  0817 22  1156 22  0025 22  1751   ALBUMIN  g/dL 3.70 3.90 3.90 4.10   BILIRUBIN mg/dL 0.7 0.7 0.6 0.7   ALK PHOS U/L 119* 135* 156* 161*   AST (SGOT) U/L 35* 39* 47* 49*   ALT (SGPT) U/L 33 39* 47* 52*     Results from last 7 days   Lab Units 06/10/22  0817 06/09/22  1156 06/09/22  0025 06/08/22  1751   CALCIUM mg/dL 8.4*  --  9.1 9.0   ALBUMIN g/dL 3.70 3.90 3.90 4.10     Results from last 7 days   Lab Units 06/09/22  0025 06/08/22  1751   PROCALCITONIN ng/mL 0.07 0.09   LACTATE mmol/L  --  1.0     COVID19   Date Value Ref Range Status   01/02/2022 Not Detected Not Detected - Ref. Range Final     SARS-CoV-2, AKUA   Date Value Ref Range Status   06/06/2022 Detected (A) Not Detected Final     Comment:     Patients who have a positive COVID-19 test result may now have  treatment options. Treatment options are available for patients  with mild to moderate symptoms and for hospitalized patients.  Visit our website at [email or web address] for  resources and information.  This nucleic acid amplification test was developed and its performance  characteristics determined by [facility]. Nucleic acid  amplification tests include RT-PCR and TMA. This test has not been  FDA cleared or approved. This test has been authorized by FDA under  an Emergency Use Authorization (EUA). This test is only authorized  for the duration of time the declaration that circumstances exist  justifying the authorization of the emergency use of in vitro  diagnostic tests for detection of SARS-CoV-2 virus and/or diagnosis  of COVID-19 infection under section 564(b)(1) of the Act, 21 U.S.C.  360bbb-3(b) (1), unless the authorization is terminated or revoked  sooner.  When diagnostic testing is negative, the possibility of a false  negative result should be considered in the context of a patient's  recent exposures and the presence of clinical signs and symptoms  consistent with COVID-19. An individual without symptoms of COVID-19  and who is not shedding SARS-CoV-2 virus  would expect to have a  negative (not detected) result in this assay.     No results found for: HGBA1C, POCGLU      Scheduled Medications  ascorbic acid, 500 mg, Oral, Daily  budesonide-formoterol, 2 puff, Inhalation, BID - RT  carvedilol, 12.5 mg, Oral, BID  cholecalciferol, 1,000 Units, Oral, Daily  DULoxetine, 20 mg, Oral, Daily  enoxaparin, 40 mg, Subcutaneous, Nightly  folic acid, 1 mg, Oral, Daily  guaiFENesin, 600 mg, Oral, Q12H  liothyronine, 15 mcg, Oral, Daily  oxybutynin XL, 10 mg, Oral, BID  remdesivir, 100 mg, Intravenous, Q24H  rosuvastatin, 5 mg, Oral, Daily  vitamin B-6, 50 mg, Oral, Daily  zinc sulfate, 220 mg, Oral, Daily    Infusions   Diet  Diet Regular; Cardiac       Assessment/Plan     Active Hospital Problems    Diagnosis  POA   • Pneumonia due to COVID-19 virus [U07.1, J12.82]  Yes   • NICM (nonischemic cardiomyopathy) (HCC) [I42.8]  Yes   • Hyponatremia [E87.1]  Yes      Resolved Hospital Problems   No resolved problems to display.       67 y.o. female admitted with <principal problem not specified>.  67-year-old woman with COVID-pneumonia presented to hospital for further management.     1.  Pneumonia due to COVID virus  Was getting monoclonal antibody infusion but she is does not qualify for this as an inpatient.  Continue remdesivir, trend labs  Elevated inflammatory markers  Holding off on dexamethasone- still on room air   Check CT chest      2. NICM  Continue coreg 12.5 BID      3. Hypothyroidism  Continue home meds     4. Hyponatremia  Na close to baseline  Not eating or drinking too much  Start some normal saline       · Lovenox 40 mg SC daily for DVT prophylaxis.  · Full code.  · Discussed with patient and nursing staff.  · Anticipate discharge home later this week.      Shawn Bright MD  [facility]  06/10/22  10:00 EDT    Patient was wearing facemask when I entered the room and throughout our encounter.  I wore protective equipment throughout this patient  encounter including a face mask, gloves and protective eyewear.  Hand hygiene was performed before donning protective equipment and after removal when leaving the room.

## 2022-06-10 NOTE — CONSULTS
"Adult Nutrition  Assessment/PES    Patient Name:  Victoria H Sturgeon  YOB: 1955  MRN: [id number]  Admit Date:  6/8/2022    Assessment Date:  6/10/2022    Comments:  MST 2. Pt admit with PNA due to COVID 19, nonischemic cardiomyopathy, hyponatremia. BMI: 21.7 (normal).    Pt on regular; cardiac diet. 25-75% intake documented. 50% average intake x 4 meals. Pt reported not eating well. Pt reported she is allergic to eggs. Listed allergy in diet order and reported to RN. Pt dislikes milky things. Pt open to trying [place] Standard 1.0 in vanilla - ordered BID. Encourage intake. Continue to follow per protocol.     Reason for Assessment     Row Name 06/10/22 1318 06/10/22 0912       Reason for Assessment    Reason For Assessment identified at risk by screening criteria --    Diagnosis other (see comments)  Pneumonia due to COVID-19 virus, nonischemic cardiomyopathy, Hyponatremia --    Identified At Risk by Screening Criteria MST SCORE 2+ --               Nutrition/Diet History     Row Name 06/10/22 1319 06/10/22 0929       Nutrition/Diet History    Typical Intake (Food/Fluid/EN/PN) pt reported not eating well. Pt open to trying [place] Standard 1.0 in vanilla. --    Food Intolerance(s) Pt allergic to eggs - reported to RN and listed allergy on dietary order. --    Factors Affecting Nutritional Intake appetite --               Anthropometrics     Row Name 06/10/22 1331          Anthropometrics    Height for Calculation 1.727 m (5' 7.99\")     Weight for Calculation 64.8 kg (142 lb 13.7 oz)     Additional Documentation --  BMI: 21.7                Labs/Tests/Procedures/Meds     Row Name 06/10/22 1321 06/10/22 0923       Labs/Procedures/Meds    Lab Results Reviewed reviewed --    Lab Results Comments Na (low), glucose (high), K (low), BUN (low), Cl (low), creat (low), c-reactive protein (high) --       Diagnostic Tests/Procedures    Diagnostic Test/Procedure Reviewed reviewed --       Medications    " "Pertinent Medications Reviewed reviewed --    Pertinent Medications Comments vitamin C, coreg, vitamin D3, cymbalta, lovenox, folic acid, levaquin, cytomel, ditropan xl, remdesivir, crestor, vitamin b6, zinc sulfate, continuous: sodium chloride, prn: mucinex, norco, melatonin, zofran, potassium chloride, sodium chloride --                 Estimated/Assessed Needs - Anthropometrics     Row Name 06/10/22 1331          Anthropometrics    Height for Calculation 1.727 m (5' 7.99\")     Weight for Calculation 64.8 kg (142 lb 13.7 oz)     Additional Documentation --  BMI: 21.7            Estimated/Assessed Needs    Additional Documentation KCAL/KG (Group);Protein Requirements (Group)            KCAL/KG    KCAL/KG 30 Kcal/Kg (kcal);25 Kcal/Kg (kcal)     25 Kcal/Kg (kcal) 1620     30 Kcal/Kg (kcal) 1944            Protein Requirements    Weight Used For Protein Calculations 64 kg (141 lb 1.5 oz)     Est Protein Requirement Amount (gms/kg) 1.2 gm protein     Estimated Protein Requirements (gms/day) 76.8                Nutrition Prescription Ordered     Row Name 06/10/22 1332          Nutrition Prescription PO    Current PO Diet Regular     Common Modifiers Cardiac                Evaluation of Received Nutrient/Fluid Intake     Row Name 06/10/22 1332          PO Evaluation    % PO Intake 25-75                     Problem/Interventions:   Problem 1     Row Name 06/10/22 1332          Nutrition Diagnoses Problem 1    Problem 1 Predicted Suboptimal Intake     Etiology (related to) Factors Affecting Nutrition     Appetite Fair     Signs/Symptoms (evidenced by) PO Intake     Percent (%) intake recorded 50 %     Over number of meals 4                      Intervention Goal     Row Name 06/10/22 5262          Intervention Goal    General Maintain nutrition;Improved nutrition related lab(s);Meet nutritional needs for age/condition;Disease management/therapy     PO Tolerate PO;Meet estimated needs;Increase intake     Weight Maintain " weight                Nutrition Intervention     Row Name 06/10/22 1333          Nutrition Intervention    RD/Tech Action Care plan reviewd;Follow Tx progress;Encourage intake;Recommend/ordered     Recommended/Ordered Supplement                Nutrition Prescription     Row Name 06/10/22 1332          Nutrition Prescription PO    PO Prescription Begin/change supplement     Supplement Other (comment)  Trinh Veliz Standard 1.0     Supplement Frequency 2 times a day     New PO Prescription Ordered? Yes                Education/Evaluation     Row Name 06/10/22 5243          Education    Education Will Instruct as appropriate            Monitor/Evaluation    Monitor Per protocol;I&O;PO intake;Supplement intake;Pertinent labs;Weight;Skin status;Symptoms                 Electronically signed by:  Lakshmi Hernandez RD  06/10/22 13:34 EDT

## 2022-06-10 NOTE — PLAN OF CARE
Problem: Adult Inpatient Plan of Care  Goal: Plan of Care Review  Outcome: Ongoing, Progressing  Flowsheets (Taken 6/10/2022 0636)  Progress: no change  Plan of Care Reviewed With: patient  Outcome Evaluation:   VSS   SR on monitor, tachy at times   c/o nausea/vomiting & pain   up ad rose   low grade fever overnight - may be from heating pad on neck?   will cont to monitor   Goal Outcome Evaluation:  Plan of Care Reviewed With: patient        Progress: no change  Outcome Evaluation: VSS; SR on monitor, tachy at times; c/o nausea/vomiting & pain; up ad rose; low grade fever overnight - may be from heating pad on neck?; will cont to monitor

## 2022-06-11 LAB
ALBUMIN SERPL-MCNC: 3.4 G/DL (ref 3.5–5.2)
ALBUMIN/GLOB SERPL: 1.3 G/DL
ALP SERPL-CCNC: 105 U/L (ref 39–117)
ALT SERPL W P-5'-P-CCNC: 28 U/L (ref 1–33)
ANION GAP SERPL CALCULATED.3IONS-SCNC: 7 MMOL/L (ref 5–15)
AST SERPL-CCNC: 26 U/L (ref 1–32)
BASOPHILS # BLD AUTO: 0.05 10*3/MM3 (ref 0–0.2)
BASOPHILS NFR BLD AUTO: 0.4 % (ref 0–1.5)
BILIRUB CONJ SERPL-MCNC: 0.2 MG/DL (ref 0–0.3)
BILIRUB SERPL-MCNC: 0.6 MG/DL (ref 0–1.2)
BUN SERPL-MCNC: 7 MG/DL (ref 8–23)
BUN/CREAT SERPL: 13.5 (ref 7–25)
CALCIUM SPEC-SCNC: 8.3 MG/DL (ref 8.6–10.5)
CHLORIDE SERPL-SCNC: 99 MMOL/L (ref 98–107)
CO2 SERPL-SCNC: 22 MMOL/L (ref 22–29)
CREAT SERPL-MCNC: 0.52 MG/DL (ref 0.57–1)
CRP SERPL-MCNC: 11.01 MG/DL (ref 0–0.5)
D DIMER PPP FEU-MCNC: 0.32 MCGFEU/ML (ref 0–0.49)
DEPRECATED RDW RBC AUTO: 37.8 FL (ref 37–54)
EGFRCR SERPLBLD CKD-EPI 2021: 102 ML/MIN/1.73
EOSINOPHIL # BLD AUTO: 0.27 10*3/MM3 (ref 0–0.4)
EOSINOPHIL NFR BLD AUTO: 2.3 % (ref 0.3–6.2)
ERYTHROCYTE [DISTWIDTH] IN BLOOD BY AUTOMATED COUNT: 11.4 % (ref 12.3–15.4)
GLOBULIN UR ELPH-MCNC: 2.6 GM/DL
GLUCOSE SERPL-MCNC: 141 MG/DL (ref 65–99)
HCT VFR BLD AUTO: 34.3 % (ref 34–46.6)
HGB BLD-MCNC: 12.1 G/DL (ref 12–15.9)
IMM GRANULOCYTES # BLD AUTO: 0.18 10*3/MM3 (ref 0–0.05)
IMM GRANULOCYTES NFR BLD AUTO: 1.5 % (ref 0–0.5)
LYMPHOCYTES # BLD AUTO: 1.44 10*3/MM3 (ref 0.7–3.1)
LYMPHOCYTES NFR BLD AUTO: 12 % (ref 19.6–45.3)
MCH RBC QN AUTO: 32.5 PG (ref 26.6–33)
MCHC RBC AUTO-ENTMCNC: 35.3 G/DL (ref 31.5–35.7)
MCV RBC AUTO: 92.2 FL (ref 79–97)
MONOCYTES # BLD AUTO: 2.08 10*3/MM3 (ref 0.1–0.9)
MONOCYTES NFR BLD AUTO: 17.3 % (ref 5–12)
NEUTROPHILS NFR BLD AUTO: 66.5 % (ref 42.7–76)
NEUTROPHILS NFR BLD AUTO: 7.98 10*3/MM3 (ref 1.7–7)
NRBC BLD AUTO-RTO: 0 /100 WBC (ref 0–0.2)
PLATELET # BLD AUTO: 193 10*3/MM3 (ref 140–450)
PMV BLD AUTO: 10 FL (ref 6–12)
POTASSIUM SERPL-SCNC: 3.9 MMOL/L (ref 3.5–5.2)
POTASSIUM SERPL-SCNC: 4 MMOL/L (ref 3.5–5.2)
PROCALCITONIN SERPL-MCNC: 0.09 NG/ML (ref 0–0.25)
PROT SERPL-MCNC: 6 G/DL (ref 6–8.5)
RBC # BLD AUTO: 3.72 10*6/MM3 (ref 3.77–5.28)
SODIUM SERPL-SCNC: 128 MMOL/L (ref 136–145)
WBC NRBC COR # BLD: 12 10*3/MM3 (ref 3.4–10.8)

## 2022-06-11 PROCEDURE — 85025 COMPLETE CBC W/AUTO DIFF WBC: CPT | Performed by: NURSE PRACTITIONER

## 2022-06-11 PROCEDURE — 36415 COLL VENOUS BLD VENIPUNCTURE: CPT | Performed by: NURSE PRACTITIONER

## 2022-06-11 PROCEDURE — 85379 FIBRIN DEGRADATION QUANT: CPT | Performed by: NURSE PRACTITIONER

## 2022-06-11 PROCEDURE — 25010000002 REMDESIVIR 100 MG/20ML SOLUTION 1 EACH VIAL: Performed by: STUDENT IN AN ORGANIZED HEALTH CARE EDUCATION/TRAINING PROGRAM

## 2022-06-11 PROCEDURE — 84145 PROCALCITONIN (PCT): CPT | Performed by: NURSE PRACTITIONER

## 2022-06-11 PROCEDURE — 92610 EVALUATE SWALLOWING FUNCTION: CPT

## 2022-06-11 PROCEDURE — 94799 UNLISTED PULMONARY SVC/PX: CPT

## 2022-06-11 PROCEDURE — 82248 BILIRUBIN DIRECT: CPT | Performed by: STUDENT IN AN ORGANIZED HEALTH CARE EDUCATION/TRAINING PROGRAM

## 2022-06-11 PROCEDURE — 80053 COMPREHEN METABOLIC PANEL: CPT | Performed by: NURSE PRACTITIONER

## 2022-06-11 PROCEDURE — 86140 C-REACTIVE PROTEIN: CPT | Performed by: NURSE PRACTITIONER

## 2022-06-11 PROCEDURE — 63710000001 ONDANSETRON PER 8 MG: Performed by: NURSE PRACTITIONER

## 2022-06-11 PROCEDURE — 25010000002 ENOXAPARIN PER 10 MG: Performed by: NURSE PRACTITIONER

## 2022-06-11 PROCEDURE — 94664 DEMO&/EVAL PT USE INHALER: CPT

## 2022-06-11 RX ORDER — LEVOFLOXACIN 5 MG/ML
750 INJECTION, SOLUTION INTRAVENOUS EVERY 24 HOURS
Status: DISCONTINUED | OUTPATIENT
Start: 2022-06-11 | End: 2022-06-12 | Stop reason: HOSPADM

## 2022-06-11 RX ADMIN — ONDANSETRON HYDROCHLORIDE 4 MG: 4 TABLET, FILM COATED ORAL at 12:46

## 2022-06-11 RX ADMIN — ROSUVASTATIN CALCIUM 5 MG: 5 TABLET, FILM COATED ORAL at 08:46

## 2022-06-11 RX ADMIN — HYDROCODONE BITARTRATE AND ACETAMINOPHEN 1 TABLET: 7.5; 325 TABLET ORAL at 08:46

## 2022-06-11 RX ADMIN — LIOTHYRONINE SODIUM 15 MCG: 5 TABLET ORAL at 08:46

## 2022-06-11 RX ADMIN — BUDESONIDE AND FORMOTEROL FUMARATE DIHYDRATE 2 PUFF: 80; 4.5 AEROSOL RESPIRATORY (INHALATION) at 08:56

## 2022-06-11 RX ADMIN — OXYBUTYNIN CHLORIDE 10 MG: 10 TABLET, EXTENDED RELEASE ORAL at 20:55

## 2022-06-11 RX ADMIN — Medication 220 MG: at 08:46

## 2022-06-11 RX ADMIN — ONDANSETRON HYDROCHLORIDE 4 MG: 4 TABLET, FILM COATED ORAL at 03:25

## 2022-06-11 RX ADMIN — DULOXETINE HYDROCHLORIDE 20 MG: 20 CAPSULE, DELAYED RELEASE ORAL at 08:46

## 2022-06-11 RX ADMIN — HYDROCODONE BITARTRATE AND ACETAMINOPHEN 1 TABLET: 7.5; 325 TABLET ORAL at 12:46

## 2022-06-11 RX ADMIN — HYDROCODONE BITARTRATE AND ACETAMINOPHEN 1 TABLET: 7.5; 325 TABLET ORAL at 20:56

## 2022-06-11 RX ADMIN — REMDESIVIR 100 MG: 100 INJECTION, POWDER, LYOPHILIZED, FOR SOLUTION INTRAVENOUS at 12:42

## 2022-06-11 RX ADMIN — FOLIC ACID 1 MG: 1 TABLET ORAL at 08:46

## 2022-06-11 RX ADMIN — HYDROCODONE BITARTRATE AND ACETAMINOPHEN 1 TABLET: 7.5; 325 TABLET ORAL at 04:14

## 2022-06-11 RX ADMIN — Medication 50 MG: at 08:46

## 2022-06-11 RX ADMIN — CARVEDILOL 12.5 MG: 12.5 TABLET, FILM COATED ORAL at 20:56

## 2022-06-11 RX ADMIN — SODIUM CHLORIDE 100 ML/HR: 9 INJECTION, SOLUTION INTRAVENOUS at 12:48

## 2022-06-11 RX ADMIN — CARVEDILOL 12.5 MG: 12.5 TABLET, FILM COATED ORAL at 08:46

## 2022-06-11 RX ADMIN — Medication 3 MG: at 00:17

## 2022-06-11 RX ADMIN — ENOXAPARIN SODIUM 40 MG: 100 INJECTION SUBCUTANEOUS at 20:55

## 2022-06-11 RX ADMIN — BUDESONIDE AND FORMOTEROL FUMARATE DIHYDRATE 2 PUFF: 80; 4.5 AEROSOL RESPIRATORY (INHALATION) at 19:15

## 2022-06-11 RX ADMIN — HYDROCODONE BITARTRATE AND ACETAMINOPHEN 1 TABLET: 7.5; 325 TABLET ORAL at 00:17

## 2022-06-11 RX ADMIN — OXYCODONE HYDROCHLORIDE AND ACETAMINOPHEN 500 MG: 500 TABLET ORAL at 08:46

## 2022-06-11 RX ADMIN — OXYBUTYNIN CHLORIDE 10 MG: 10 TABLET, EXTENDED RELEASE ORAL at 08:46

## 2022-06-11 RX ADMIN — Medication 1000 UNITS: at 08:46

## 2022-06-11 RX ADMIN — GUAIFENESIN 600 MG: 600 TABLET, EXTENDED RELEASE ORAL at 08:46

## 2022-06-11 RX ADMIN — SODIUM CHLORIDE 100 ML/HR: 9 INJECTION, SOLUTION INTRAVENOUS at 02:27

## 2022-06-11 RX ADMIN — GUAIFENESIN AND DEXTROMETHORPHAN HYDROBROMIDE 1 TABLET: 600; 30 TABLET, EXTENDED RELEASE ORAL at 03:25

## 2022-06-11 RX ADMIN — LEVOFLOXACIN 750 MG: 750 TABLET, FILM COATED ORAL at 11:00

## 2022-06-11 RX ADMIN — GUAIFENESIN 600 MG: 600 TABLET, EXTENDED RELEASE ORAL at 20:55

## 2022-06-11 RX ADMIN — HYDROCODONE BITARTRATE AND ACETAMINOPHEN 1 TABLET: 7.5; 325 TABLET ORAL at 16:46

## 2022-06-11 NOTE — PLAN OF CARE
Goal Outcome Evaluation:  Plan of Care Reviewed With: patient             Problem: Adult Inpatient Plan of Care  Goal: Plan of Care Review  Flowsheets (Taken 6/11/2022 1510)  Plan of Care Reviewed With: patient  Outcome Evaluation: Clinical swallow eval completed. Pt and RN deny any swallowing difficulties. Pt has history of reflux, with daily PPI, sore throat, globus sensation, and vocal hoarseness. No s/s of aspiration or patterns of dysphagia with any PO.   Do not suspect oropharyngeal dysphagia. If pt is aspirating, would suspect it could be reflux-related. Noted MD ordered VFSS- will complete to r/o aspiration if felt indicated.   Until then, ok to continue regular diet/thin liquids. Meds whole as tolerated.   Upright for PO, reflux precautions.

## 2022-06-11 NOTE — PLAN OF CARE
Problem: Adult Inpatient Plan of Care  Goal: Plan of Care Review  Outcome: Ongoing, Progressing  Flowsheets (Taken 6/11/2022 0652)  Progress: no change  Plan of Care Reviewed With: patient  Outcome Evaluation:   VSS   SR on monitor   on RA   c/o pain- medicated Q4H per pt request   c/o nausea throughout the night, no vomiting, medicated x2   pt slept on and off   IVF cont   K+ replaced, recheck = 4.0   will cont to monitor   Goal Outcome Evaluation:  Plan of Care Reviewed With: patient        Progress: no change  Outcome Evaluation: VSS; SR on monitor; on RA; c/o pain- medicated Q4H per pt request; c/o nausea throughout the night, no vomiting, medicated x2; pt slept on and off; IVF cont; K+ replaced, recheck = 4.0; will cont to monitor
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
## 2022-06-11 NOTE — PROGRESS NOTES
Name: Victoria H Sturgeon ADMIT: 2022   : 1955  PCP: Deana Wilcox MD    MRN: [id number] LOS: 2 days   AGE/SEX: 67 y.o. female  ROOM: [facility]     Subjective   Subjective   Reports feeling better on the Levaquin.  Inflammatory markers stable    Review of Systems      GENERAL: No fevers, chills, sweats.    RESPIRATORY: No cough, shortness of breath, hemoptysis or wheezing.   CVS: No chest pain, palpitations, orthopnea, dyspnea on exertion   GI: No melena or hematochezia. No abdominal pain. No nausea, vomiting, constipation, diarrhea      Objective   Objective   Vital Signs  Temp:  [97.6 °F (36.4 °C)-99.6 °F (37.6 °C)] 98.8 °F (37.1 °C)  Heart Rate:  [76-98] 89  Resp:  [20] 20  BP: (104-129)/(69-88) 124/77  SpO2:  [95 %-100 %] 100 %  on   ;   Device (Oxygen Therapy):  system;room air  Body mass index is 21.71 kg/m².  Physical Exam    General: Alert and oriented x3, no acute distress  HEENT: Normocephalic, atraumatic  CV: Regular rate and rhythm, no murmurs rubs or gallops  Lungs: Clear to auscultation bilaterally, no crackles or wheezes  Abdomen: Soft, nontender, nondistended  Neuro: CN II-XII intact, no FND appreciated     Results Review     I reviewed the patient's new clinical results.  Results from last 7 days   Lab Units 22  0832 06/10/22  0817 22  0025 22  1751   WBC 10*3/mm3 12.00* 14.96* 12.98* 11.38*   HEMOGLOBIN g/dL 12.1 12.9 14.2 13.9   PLATELETS 10*3/mm3 193 201 230 237     Results from last 7 days   Lab Units 22  0832 06/10/22  2352 06/10/22  0817 22  1156 22  0025 22  1751   SODIUM mmol/L 128*  --  127*  --  131* 130*   POTASSIUM mmol/L 3.9 4.0 2.9*  --  3.5 4.0   CHLORIDE mmol/L 99  --  92*  --  95* 93*   CO2 mmol/L 22.0  --  22.3  --  23.0 23.0   BUN mg/dL 7*  --  5*  --  9 9   CREATININE mg/dL 0.52*  --  0.43* 0.56* 0.77 0.81   GLUCOSE mg/dL 141*  --  121*  --  167* 149*   Estimated Creatinine Clearance: 107.4 mL/min (A) (by  C-G formula based on SCr of 0.52 mg/dL (L)).  Results from last 7 days   Lab Units 06/11/22  0832 06/10/22  0817 06/09/22  1156 06/09/22  0025   ALBUMIN g/dL 3.40* 3.70 3.90 3.90   BILIRUBIN mg/dL 0.6 0.7 0.7 0.6   ALK PHOS U/L 105 119* 135* 156*   AST (SGOT) U/L 26 35* 39* 47*   ALT (SGPT) U/L 28 33 39* 47*     Results from last 7 days   Lab Units 06/11/22  0832 06/10/22  0817 06/09/22  1156 06/09/22  0025 06/08/22  1751   CALCIUM mg/dL 8.3* 8.4*  --  9.1 9.0   ALBUMIN g/dL 3.40* 3.70 3.90 3.90 4.10     Results from last 7 days   Lab Units 06/11/22  0832 06/09/22  0025 06/08/22  1751   PROCALCITONIN ng/mL 0.09 0.07 0.09   LACTATE mmol/L  --   --  1.0     COVID19   Date Value Ref Range Status   01/02/2022 Not Detected Not Detected - Ref. Range Final     SARS-CoV-2, AKUA   Date Value Ref Range Status   06/06/2022 Detected (A) Not Detected Final     Comment:     Patients who have a positive COVID-19 test result may now have  treatment options. Treatment options are available for patients  with mild to moderate symptoms and for hospitalized patients.  Visit our website at [email or web address] for  resources and information.  This nucleic acid amplification test was developed and its performance  characteristics determined by [employer]. Nucleic acid  amplification tests include RT-PCR and TMA. This test has not been  FDA cleared or approved. This test has been authorized by FDA under  an Emergency Use Authorization (EUA). This test is only authorized  for the duration of time the declaration that circumstances exist  justifying the authorization of the emergency use of in vitro  diagnostic tests for detection of SARS-CoV-2 virus and/or diagnosis  of COVID-19 infection under section 564(b)(1) of the Act, 21 U.S.C.  360bbb-3(b) (1), unless the authorization is terminated or revoked  sooner.  When diagnostic testing is negative, the possibility of a false  negative result should be considered in the  context of a patient's  recent exposures and the presence of clinical signs and symptoms  consistent with COVID-19. An individual without symptoms of COVID-19  and who is not shedding SARS-CoV-2 virus would expect to have a  negative (not detected) result in this assay.     No results found for: HGBA1C, POCGLU      Scheduled Medications  ascorbic acid, 500 mg, Oral, Daily  budesonide-formoterol, 2 puff, Inhalation, BID - RT  carvedilol, 12.5 mg, Oral, BID  cholecalciferol, 1,000 Units, Oral, Daily  DULoxetine, 20 mg, Oral, Daily  enoxaparin, 40 mg, Subcutaneous, Nightly  folic acid, 1 mg, Oral, Daily  guaiFENesin, 600 mg, Oral, Q12H  levoFLOXacin, 750 mg, Oral, Q24H  liothyronine, 15 mcg, Oral, Daily  oxybutynin XL, 10 mg, Oral, BID  remdesivir, 100 mg, Intravenous, Q24H  rosuvastatin, 5 mg, Oral, Daily  vitamin B-6, 50 mg, Oral, Daily  zinc sulfate, 220 mg, Oral, Daily    Infusions  sodium chloride, 100 mL/hr, Last Rate: 100 mL/hr (06/11/22 0227)    Diet  Diet Regular; Cardiac       Assessment/Plan     Active Hospital Problems    Diagnosis  POA   • Pneumonia due to COVID-19 virus [U07.1, J12.82]  Yes   • NICM (nonischemic cardiomyopathy) (HCC) [I42.8]  Yes   • Hyponatremia [E87.1]  Yes      Resolved Hospital Problems   No resolved problems to display.       67 y.o. female admitted with <principal problem not specified>.  67-year-old woman with COVID-pneumonia presented to hospital for further management.     1.  Pneumonia due to COVID virus  Was getting monoclonal antibody infusion but she is does not qualify for this as an inpatient.  Continue remdesivir, trend labs  Elevated inflammatory markers  Holding off on dexamethasone- still on room air   Chest CT with dense right lower lobe infiltrate.  Started on Levaquin.  WBC slowly downtrending  Levaquin started 6/10  Speech eval for concerns of aspiration PNA     2. NICM  Continue coreg 12.5 BID      3. Hypothyroidism  Continue home meds     4. Hyponatremia  Na close  to baseline  Not eating or drinking too much  Start some normal saline       · Lovenox 40 mg SC daily for DVT prophylaxis.  · Full code.  · Discussed with patient and nursing staff.  · Anticipate discharge home later this week.      Shawn Bright MD  [facility]  06/11/22  11:44 EDT    Patient was wearing facemask when I entered the room and throughout our encounter.  I wore protective equipment throughout this patient encounter including a face mask, gloves and protective eyewear.  Hand hygiene was performed before donning protective equipment and after removal when leaving the room.

## 2022-06-11 NOTE — PLAN OF CARE
Problem: Adult Inpatient Plan of Care  Goal: Plan of Care Review  6/11/2022 1754 by Nadia Funes RN  Outcome: Ongoing, Progressing  Flowsheets (Taken 6/11/2022 1751)  Outcome Evaluation:   patient AOX4, C/O back and neck pain with nausea   pain and nausea meds given upon request by patient   remains on RA   VSS, no acute distress noted   possible d/c tomorrow.  6/11/2022 1751 by Nadia Funes RN  Outcome: Ongoing, Progressing  Flowsheets (Taken 6/11/2022 1751)  Progress: improving  Plan of Care Reviewed With: patient  Outcome Evaluation:   patient AOX4, C/O back and neck pain with nausea   pain and nausea meds given upon request by patient   remains on RA   VSS, no acute distress noted   possible d/c tomorrow.  Goal: Patient-Specific Goal (Individualized)  6/11/2022 1754 by Nadia Funes RN  Outcome: Ongoing, Progressing  6/11/2022 1751 by Nadia Funes RN  Outcome: Ongoing, Progressing  Goal: Absence of Hospital-Acquired Illness or Injury  6/11/2022 1754 by Nadia Funes RN  Outcome: Ongoing, Progressing  6/11/2022 1751 by Nadia Funes RN  Outcome: Ongoing, Progressing  Intervention: Identify and Manage Fall Risk  Recent Flowsheet Documentation  Taken 6/11/2022 1448 by Nadia Funes RN  Safety Promotion/Fall Prevention: safety round/check completed  Taken 6/11/2022 1246 by Nadia Funes RN  Safety Promotion/Fall Prevention: safety round/check completed  Taken 6/11/2022 0845 by Nadia Funes RN  Safety Promotion/Fall Prevention:   assistive device/personal items within reach   clutter free environment maintained   lighting adjusted   nonskid shoes/slippers when out of bed   safety round/check completed  Intervention: Prevent Infection  Recent Flowsheet Documentation  Taken 6/11/2022 1246 by Nadia Funes RN  Infection Prevention:   environmental surveillance performed   equipment surfaces disinfected   personal protective equipment utilized   hand hygiene  promoted   rest/sleep promoted   single patient room provided   visitors restricted/screened  Taken 6/11/2022 0845 by Nadia Funes RN  Infection Prevention:   environmental surveillance performed   equipment surfaces disinfected   hand hygiene promoted   personal protective equipment utilized   rest/sleep promoted   single patient room provided  Goal: Optimal Comfort and Wellbeing  6/11/2022 1754 by Nadia Funes RN  Outcome: Ongoing, Progressing  6/11/2022 1751 by Nadia Funes RN  Outcome: Ongoing, Progressing  Intervention: Monitor Pain and Promote Comfort  Recent Flowsheet Documentation  Taken 6/11/2022 0845 by Nadia Funes RN  Pain Management Interventions: see MAR  Goal: Readiness for Transition of Care  6/11/2022 1754 by Nadia Funes RN  Outcome: Ongoing, Progressing  6/11/2022 1751 by Nadia Funes RN  Outcome: Ongoing, Progressing     Problem: Fall Injury Risk  Goal: Absence of Fall and Fall-Related Injury  6/11/2022 1754 by Nadia Funes RN  Outcome: Ongoing, Progressing  6/11/2022 1751 by Nadia Funes RN  Outcome: Ongoing, Progressing  Intervention: Identify and Manage Contributors  Recent Flowsheet Documentation  Taken 6/11/2022 0845 by Nadia Funes RN  Medication Review/Management: medications reviewed  Intervention: Promote Injury-Free Environment  Recent Flowsheet Documentation  Taken 6/11/2022 1448 by Nadia uFnes RN  Safety Promotion/Fall Prevention: safety round/check completed  Taken 6/11/2022 1246 by Nadia Funes RN  Safety Promotion/Fall Prevention: safety round/check completed  Taken 6/11/2022 0845 by Nadia Funes RN  Safety Promotion/Fall Prevention:   assistive device/personal items within reach   clutter free environment maintained   lighting adjusted   nonskid shoes/slippers when out of bed   safety round/check completed     Problem: Skin Injury Risk Increased  Goal: Skin Health and Integrity  6/11/2022 1754 by Shantel  Nadia STONER RN  Outcome: Ongoing, Progressing  6/11/2022 1751 by Nadia Funes RN  Outcome: Ongoing, Progressing  Intervention: Optimize Skin Protection  Recent Flowsheet Documentation  Taken 6/11/2022 0845 by Nadia Funes RN  Pressure Reduction Techniques:   frequent weight shift encouraged   weight shift assistance provided  Pressure Reduction Devices: pressure-redistributing mattress utilized     Problem: Infection (Pneumonia)  Goal: Resolution of Infection Signs and Symptoms  6/11/2022 1754 by Nadia Funes RN  Outcome: Ongoing, Progressing  6/11/2022 1751 by Nadia Funes RN  Outcome: Ongoing, Progressing  Intervention: Prevent Infection Progression  Recent Flowsheet Documentation  Taken 6/11/2022 1246 by Nadia Funes RN  Isolation Precautions:   precautions maintained   enhanced contact  Taken 6/11/2022 0845 by Nadia Funes RN  Isolation Precautions:   precautions maintained   enhanced contact   Goal Outcome Evaluation:

## 2022-06-12 ENCOUNTER — READMISSION MANAGEMENT (OUTPATIENT)
Dept: CALL CENTER | Facility: HOSPITAL | Age: 67
End: 2022-06-12

## 2022-06-12 VITALS
DIASTOLIC BLOOD PRESSURE: 83 MMHG | RESPIRATION RATE: 18 BRPM | HEIGHT: 68 IN | WEIGHT: 142.8 LBS | BODY MASS INDEX: 21.64 KG/M2 | HEART RATE: 84 BPM | TEMPERATURE: 98.2 F | OXYGEN SATURATION: 99 % | SYSTOLIC BLOOD PRESSURE: 154 MMHG

## 2022-06-12 PROBLEM — J18.1 LOBAR PNEUMONIA (HCC): Status: ACTIVE | Noted: 2022-06-12

## 2022-06-12 LAB
ALBUMIN SERPL-MCNC: 3 G/DL (ref 3.5–5.2)
ALBUMIN/GLOB SERPL: 1.1 G/DL
ALP SERPL-CCNC: 137 U/L (ref 39–117)
ALT SERPL W P-5'-P-CCNC: 35 U/L (ref 1–33)
ANION GAP SERPL CALCULATED.3IONS-SCNC: 11 MMOL/L (ref 5–15)
AST SERPL-CCNC: 38 U/L (ref 1–32)
BILIRUB CONJ SERPL-MCNC: 0.2 MG/DL (ref 0–0.3)
BILIRUB SERPL-MCNC: 0.6 MG/DL (ref 0–1.2)
BUN SERPL-MCNC: 6 MG/DL (ref 8–23)
BUN/CREAT SERPL: 13.3 (ref 7–25)
CALCIUM SPEC-SCNC: 8.5 MG/DL (ref 8.6–10.5)
CHLORIDE SERPL-SCNC: 98 MMOL/L (ref 98–107)
CO2 SERPL-SCNC: 20 MMOL/L (ref 22–29)
CREAT SERPL-MCNC: 0.45 MG/DL (ref 0.57–1)
CRP SERPL-MCNC: 10.4 MG/DL (ref 0–0.5)
DEPRECATED RDW RBC AUTO: 37.6 FL (ref 37–54)
EGFRCR SERPLBLD CKD-EPI 2021: 105.6 ML/MIN/1.73
EOSINOPHIL # BLD MANUAL: 0.21 10*3/MM3 (ref 0–0.4)
EOSINOPHIL NFR BLD MANUAL: 2 % (ref 0.3–6.2)
ERYTHROCYTE [DISTWIDTH] IN BLOOD BY AUTOMATED COUNT: 11.4 % (ref 12.3–15.4)
GLOBULIN UR ELPH-MCNC: 2.8 GM/DL
GLUCOSE SERPL-MCNC: 96 MG/DL (ref 65–99)
HCT VFR BLD AUTO: 33.9 % (ref 34–46.6)
HGB BLD-MCNC: 11.8 G/DL (ref 12–15.9)
LYMPHOCYTES # BLD MANUAL: 1.24 10*3/MM3 (ref 0.7–3.1)
LYMPHOCYTES NFR BLD MANUAL: 15.2 % (ref 5–12)
MCH RBC QN AUTO: 31.8 PG (ref 26.6–33)
MCHC RBC AUTO-ENTMCNC: 34.8 G/DL (ref 31.5–35.7)
MCV RBC AUTO: 91.4 FL (ref 79–97)
MONOCYTES # BLD: 1.56 10*3/MM3 (ref 0.1–0.9)
NEUTROPHILS # BLD AUTO: 7.25 10*3/MM3 (ref 1.7–7)
NEUTROPHILS NFR BLD MANUAL: 70.7 % (ref 42.7–76)
PLAT MORPH BLD: NORMAL
PLATELET # BLD AUTO: 234 10*3/MM3 (ref 140–450)
PMV BLD AUTO: 10.1 FL (ref 6–12)
POTASSIUM SERPL-SCNC: 3.5 MMOL/L (ref 3.5–5.2)
PROT SERPL-MCNC: 5.8 G/DL (ref 6–8.5)
RBC # BLD AUTO: 3.71 10*6/MM3 (ref 3.77–5.28)
RBC MORPH BLD: NORMAL
SODIUM SERPL-SCNC: 129 MMOL/L (ref 136–145)
VARIANT LYMPHS NFR BLD MANUAL: 12.1 % (ref 19.6–45.3)
WBC MORPH BLD: NORMAL
WBC NRBC COR # BLD: 10.26 10*3/MM3 (ref 3.4–10.8)

## 2022-06-12 PROCEDURE — 63710000001 ONDANSETRON PER 8 MG: Performed by: NURSE PRACTITIONER

## 2022-06-12 PROCEDURE — 94664 DEMO&/EVAL PT USE INHALER: CPT

## 2022-06-12 PROCEDURE — 86140 C-REACTIVE PROTEIN: CPT | Performed by: NURSE PRACTITIONER

## 2022-06-12 PROCEDURE — 80053 COMPREHEN METABOLIC PANEL: CPT | Performed by: NURSE PRACTITIONER

## 2022-06-12 PROCEDURE — 85025 COMPLETE CBC W/AUTO DIFF WBC: CPT | Performed by: NURSE PRACTITIONER

## 2022-06-12 PROCEDURE — 82248 BILIRUBIN DIRECT: CPT | Performed by: STUDENT IN AN ORGANIZED HEALTH CARE EDUCATION/TRAINING PROGRAM

## 2022-06-12 PROCEDURE — 85007 BL SMEAR W/DIFF WBC COUNT: CPT | Performed by: NURSE PRACTITIONER

## 2022-06-12 PROCEDURE — 94799 UNLISTED PULMONARY SVC/PX: CPT

## 2022-06-12 RX ORDER — PREDNISONE 10 MG/1
10 TABLET ORAL DAILY
Qty: 5 TABLET | Refills: 0 | Status: SHIPPED | OUTPATIENT
Start: 2022-06-12 | End: 2022-08-09

## 2022-06-12 RX ORDER — LEVOFLOXACIN 750 MG/1
750 TABLET ORAL DAILY
Qty: 4 TABLET | Refills: 0 | Status: SHIPPED | OUTPATIENT
Start: 2022-06-12 | End: 2022-06-16

## 2022-06-12 RX ADMIN — BUDESONIDE AND FORMOTEROL FUMARATE DIHYDRATE 2 PUFF: 80; 4.5 AEROSOL RESPIRATORY (INHALATION) at 08:51

## 2022-06-12 RX ADMIN — Medication 3 MG: at 00:49

## 2022-06-12 RX ADMIN — HYDROCODONE BITARTRATE AND ACETAMINOPHEN 1 TABLET: 7.5; 325 TABLET ORAL at 11:23

## 2022-06-12 RX ADMIN — LIOTHYRONINE SODIUM 15 MCG: 5 TABLET ORAL at 08:19

## 2022-06-12 RX ADMIN — ROSUVASTATIN CALCIUM 5 MG: 5 TABLET, FILM COATED ORAL at 08:19

## 2022-06-12 RX ADMIN — CARVEDILOL 12.5 MG: 12.5 TABLET, FILM COATED ORAL at 08:19

## 2022-06-12 RX ADMIN — FOLIC ACID 1 MG: 1 TABLET ORAL at 08:19

## 2022-06-12 RX ADMIN — HYDROCODONE BITARTRATE AND ACETAMINOPHEN 1 TABLET: 7.5; 325 TABLET ORAL at 00:49

## 2022-06-12 RX ADMIN — Medication 220 MG: at 08:19

## 2022-06-12 RX ADMIN — DULOXETINE HYDROCHLORIDE 20 MG: 20 CAPSULE, DELAYED RELEASE ORAL at 08:19

## 2022-06-12 RX ADMIN — HYDROCODONE BITARTRATE AND ACETAMINOPHEN 1 TABLET: 7.5; 325 TABLET ORAL at 06:30

## 2022-06-12 RX ADMIN — Medication 1000 UNITS: at 08:19

## 2022-06-12 RX ADMIN — GUAIFENESIN 600 MG: 600 TABLET, EXTENDED RELEASE ORAL at 08:19

## 2022-06-12 RX ADMIN — OXYBUTYNIN CHLORIDE 10 MG: 10 TABLET, EXTENDED RELEASE ORAL at 08:19

## 2022-06-12 RX ADMIN — ONDANSETRON HYDROCHLORIDE 4 MG: 4 TABLET, FILM COATED ORAL at 08:18

## 2022-06-12 RX ADMIN — OXYCODONE HYDROCHLORIDE AND ACETAMINOPHEN 500 MG: 500 TABLET ORAL at 08:19

## 2022-06-12 RX ADMIN — Medication 50 MG: at 08:19

## 2022-06-12 NOTE — OUTREACH NOTE
Prep Survey    Flowsheet Row Responses   [facility] facility patient discharged from? [place]   Is LACE score < 7 ? No   Emergency Room discharge w/ pulse ox? No   Eligibility Readm Mgmt   Discharge diagnosis PNA d/t COVID-19, Nonischemic cardiomyopathy, hyponatremia,    Does the patient have one of the following disease processes/diagnoses(primary or secondary)? COVID-19   Does the patient have Home health ordered? No   Is there a DME ordered? No   Prep survey completed? Yes          YULI Bryant Registered Nurse

## 2022-06-12 NOTE — DISCHARGE SUMMARY
Patient Name: Victoria H Sturgeon  : 1955  MRN: [id number]    Date of Admission: 2022  Date of Discharge:  2022  Primary Care Physician: Deana Wilcox MD      Chief Complaint:   COVID +, Cough, and Sore Throat      Discharge Diagnoses     Active Hospital Problems    Diagnosis  POA   • Pneumonia due to COVID-19 virus [U07.1, J12.82]  Yes   • NICM (nonischemic cardiomyopathy) ([facility]) [I42.8]  Yes   • Hyponatremia [E87.1]  Yes      Resolved Hospital Problems   No resolved problems to display.        Hospital Course     This is a 67-year-old woman with a past medical history of hypertension, hyperlipidemia, nonischemic cardiomyopathy and tobacco use disorder who came to the hospital with cough and congestion.  She was told by her primary care physician to get a COVID test which subsequently came back positive and she was supposed have a monoclonal antibody infusion on the day of admission however as she presented to leave ER and was admitted she did not qualify this.  Chest x-ray in the ER showed focal infiltrates and no atelectasis of the left lung base.  I did a chest CT that showed a sizable dense pneumonia at the right lower lobe and significant bronchial thickening, with which there was also a concern for aspiration pneumonia.  Regarding her COVID, she has since sustained on room air throughout the duration of this admission.  She received 3 days of remdesivir and did not require any dexamethasone as she was always in room air.  I did start her on Levaquin for the bacterial pneumonia she has an allergy to Augmentin and cefdinir.  She was started on the antibiotic initially on 6/10/22 and she will complete a 7-day course.  As this was a concern for aspiration pneumonia she will need to complete a video swallow study as an outpatient.    Of note, she has been complaining of sore throat with cough and reports that prednisone always helps her in these situations.  I have no issue with  prescribing her 5-day course of 10 mg of prednisone.    Day of Discharge       Physical Exam:  Temp:  [98.2 °F (36.8 °C)-99.6 °F (37.6 °C)] 98.2 °F (36.8 °C)  Heart Rate:  [74-85] 84  Resp:  [17-20] 18  BP: (132-154)/(80-85) 154/83  Body mass index is 21.71 kg/m².  Physical Exam  General: Alert and oriented x3, no acute distress.  HEENT: Normocephalic, atraumatic  Eyes: PERRL, EOMI, anicteric sclera  Lungs coarse lung sounds at the right lung base.  Otherwise normal work of breathing on room air   CV: Regular rate and rhythm, no murmurs rubs or gallops  Abdomen: Soft, nontender, nondistended.  Normoactive bowel sounds  Extremities: No significant peripheral edema  Skin: Clean/dry/intact, no rashes  Neuro: Cranial nerves II through XII intact, no gross focal neurological deficits appreciated  Psych: Appropriate mood and affect    Consultants     Consult Orders (all) (From admission, onward)     Start     Ordered    06/11/22 1755  Inpatient Consult to Advance Care Planning  Once        Provider:  (Not yet assigned)    06/11/22 1754    06/08/22 1832  [facility] (on-call MD unless specified) Details  Once        Specialty:  Hospitalist  Provider:  Forrest Hill APRN    06/08/22 1831    06/08/22 1831  [facility] (on-call MD unless specified) Details  Once        Specialty:  Hospitalist  Provider:  Forrest Hill APRN    06/08/22 1830              Procedures     Imaging Results (All)     Procedure Component Value Units Date/Time    CT Chest Without Contrast Diagnostic [[id number]] Collected: 06/10/22 1448     Updated: 06/10/22 1712    Narrative:      CT CHEST WITHOUT CONTRAST     HISTORY: 67-year-old female with pneumonia.     TECHNIQUE: Radiation dose reduction techniques were utilized, including  automated exposure control and exposure modulation based on body size.   3 mm images were obtained through the chest without the administration  of IV contrast. Compared with chest radiograph 06/08/2022.     FINDINGS: There is a  moderate-sized dense airspace consolidation at the  right lower lobe and there are multiple significantly thickened bronchi  which extend to the consolidation. There is also a very small  ill-defined opacity at the dependent aspect of the left lung base.  Calcified granuloma is noted at the left upper lobe. There is a minimal  right pleural effusion. There are shotty and borderline enlarged right  infrahilar nodes. There may be an enlarged node along the right lower  lobe bronchovascular bundle. Characterization is limited in the absence  of IV contrast. There are multiple ununited subacute/old right rib  fractures.       Impression:      1. The sizable dense pneumonia at the right lower lobe and significant  bronchial thickening may be secondary to bronchitis and bacterial  pneumonia, and aspiration should be considered as well.   2. The very small ill-defined left basilar airspace opacity is  nonspecific and may represent mild atelectasis or an infectious  infiltrate.     This report was finalized on 6/10/2022 5:08 PM by Dr. Kavitha Rosas M.D.       XR Chest AP [[id number]] Collected: 06/08/22 1816     Updated: 06/08/22 1820    Narrative:      PORTABLE CHEST 06/08/2022 AT 5:59 PM     CLINICAL HISTORY: Preop. Fever. COVID evaluation.     There is fairly confluent abnormal increased density at the left lung  base that is producing partial obscuration of the left hemidiaphragm.  This is consistent with atelectasis and/or infiltrate in the left lower  lobe. The right lung is well expanded and clear. The cardiomediastinal  silhouette is unremarkable.     IMPRESSIONS: Focal infiltrate and/or atelectasis at the left lung base.     This report was finalized on 6/8/2022 6:17 PM by Dr. Chris Aguirre M.D.             Pertinent Labs     Results from last 7 days   Lab Units 06/12/22  0827 06/11/22  0832 06/10/22  0817 06/09/22  0025   WBC 10*3/mm3 10.26 12.00* 14.96* 12.98*   HEMOGLOBIN g/dL 11.8* 12.1 12.9 14.2   PLATELETS  10*3/mm3 234 193 201 230     Results from last 7 days   Lab Units 06/12/22  0827 06/11/22  0832 06/10/22  2352 06/10/22  0817 06/09/22  1156 06/09/22  0025   SODIUM mmol/L 129* 128*  --  127*  --  131*   POTASSIUM mmol/L 3.5 3.9 4.0 2.9*  --  3.5   CHLORIDE mmol/L 98 99  --  92*  --  95*   CO2 mmol/L 20.0* 22.0  --  22.3  --  23.0   BUN mg/dL 6* 7*  --  5*  --  9   CREATININE mg/dL 0.45* 0.52*  --  0.43* 0.56* 0.77   GLUCOSE mg/dL 96 141*  --  121*  --  167*   Estimated Creatinine Clearance: 124.1 mL/min (A) (by C-G formula based on SCr of 0.45 mg/dL (L)).  Results from last 7 days   Lab Units 06/12/22  0827 06/11/22  0832 06/10/22  0817 06/09/22  1156   ALBUMIN g/dL 3.00* 3.40* 3.70 3.90   BILIRUBIN mg/dL 0.6 0.6 0.7 0.7   ALK PHOS U/L 137* 105 119* 135*   AST (SGOT) U/L 38* 26 35* 39*   ALT (SGPT) U/L 35* 28 33 39*     Results from last 7 days   Lab Units 06/12/22  0827 06/11/22  0832 06/10/22  0817 06/09/22  1156 06/09/22  0025   CALCIUM mg/dL 8.5* 8.3* 8.4*  --  9.1   ALBUMIN g/dL 3.00* 3.40* 3.70 3.90 3.90       Results from last 7 days   Lab Units 06/11/22  0832 06/09/22  0025   D DIMER QUANT MCGFEU/mL 0.32 <0.27           Invalid input(s): LDLCALC        Test Results Pending at Discharge       Discharge Details        Discharge Medications      New Medications      Instructions Start Date   levoFLOXacin 750 MG tablet  Commonly known as: Levaquin   750 mg, Oral, Daily      predniSONE 10 MG tablet  Commonly known as: DELTASONE   10 mg, Oral, Daily         Continue These Medications      Instructions Start Date   Advair Diskus 250-50 MCG/ACT DISKUS  Generic drug: Fluticasone-Salmeterol   INL 1 PUFF PO TWICE DAILY. RINSE MOUTH AFTER U      carvedilol 12.5 MG tablet  Commonly known as: COREG   1 tablet, Oral, 2 Times Daily      DULoxetine 20 MG capsule  Commonly known as: CYMBALTA   20 mg, Oral, Daily      estradiol 0.075 MG/24HR patch  Commonly known as: MINIVELLE, VIVELLE-DOT   1 patch, Transdermal, 2 Times  Weekly      folic acid 1 MG tablet  Commonly known as: FOLVITE   1 mg, Oral, Daily      HYDROcodone-acetaminophen 7.5-325 MG per tablet  Commonly known as: NORCO   1 tablet, Oral, Every 4 Hours PRN      liothyronine 5 MCG tablet  Commonly known as: CYTOMEL   TK 3 TS PO QD      omeprazole 40 MG capsule  Commonly known as: priLOSEC   40 mg, Oral, Daily      oxybutynin XL 10 MG 24 hr tablet  Commonly known as: DITROPAN-XL   10 mg, Oral, 2 Times Daily      Progesterone 100 MG capsule  Commonly known as: PROMETRIUM   TAKE 1 CAPSULE BY MOUTH EVERY DAY      rosuvastatin 5 MG tablet  Commonly known as: CRESTOR   5 mg, Oral, Daily      vitamin B-12 1000 MCG tablet  Commonly known as: CYANOCOBALAMIN   1,000 mcg, Oral, Daily      zolpidem 10 MG tablet  Commonly known as: AMBIEN   TAKE 1 TABLET BY MOUTH DAILY AT BEDTIME AS NEEDED FOR SLEEP             Allergies   Allergen Reactions   • Cefdinir Anaphylaxis   • Latex Anaphylaxis   • Amoxicillin-Pot Clavulanate Nausea And Vomiting   • Eggs Or Egg-Derived Products Unknown - Low Severity         Discharge Disposition: home      Discharge Diet:  Diet Order   Procedures   • Diet Regular; Cardiac       Discharge Activity: as tolerated       CODE STATUS:    Code Status and Medical Interventions:   Ordered at: 06/08/22 1903     Code Status (Patient has no pulse and is not breathing):    CPR (Attempt to Resuscitate)     Medical Interventions (Patient has pulse or is breathing):    Full Support       Future Appointments   Date Time Provider Department Center   6/21/2022  1:30 PM [facility] ECHO CART 5 BH [facility] C[facility]I [facility]   6/28/2022  2:00 PM Eden Booker MD [facility]   7/7/2022 10:45 AM Zeynep Phan MD [facility] PI[facility] DUP [facility]   1/16/2023  8:15 AM Zeynep Phan MD [facility] PI DUP [facility]      Follow-up Information     Deana Wilcox MD .    Specialty: Internal Medicine  Contact information:  [address], [address] [address] [phone]                          Time Spent on Discharge:  Greater than 30 minutes      Shawn Bright MD  [facility]  06/12/22  11:06 EDT

## 2022-06-13 ENCOUNTER — READMISSION MANAGEMENT (OUTPATIENT)
Dept: CALL CENTER | Facility: HOSPITAL | Age: 67
End: 2022-06-13

## 2022-06-13 NOTE — OUTREACH NOTE
COVID-19 Week 1 Survey    Flowsheet Row Responses   [facility] patient discharged from? [place]   Does the patient have one of the following disease processes/diagnoses(primary or secondary)? COVID-19   COVID-19 underlying condition? None   Call Number Call 1   Week 1 Call successful? Yes   Call start time 1631   Call end time 1648   Discharge diagnosis PNA d/t COVID-19, Nonischemic cardiomyopathy, hyponatremia,    Meds reviewed with patient/caregiver? Yes   Is the patient having any side effects they believe may be caused by any medication additions or changes? No   Does the patient have all medications ordered at discharge? Yes   Is the patient taking all medications as directed (includes completed medication regime)? Yes   Does the patient have a primary care provider?  Yes   Does the patient have an appointment with their PCP or specialist within 7 days of discharge? Yes   Has the patient kept scheduled appointments due by today? Yes  [informal virtual appt, will see again soon]   Has home health visited the patient within 72 hours of discharge? N/A   Psychosocial issues? No   Did the patient receive a copy of their discharge instructions? Yes   Did the patient receive a copy of COVID-19 specific instructions? Yes   Nursing interventions Reviewed instructions with patient   What is the patient's perception of their health status since discharge? Improving   Does the patient have any of the following symptoms? Shortness of breath, Cough   Nursing Interventions Nurse provided patient education  [advised to replace toothbrush, lipstick, eye gtts, nose gtts, etc]   Pulse Ox monitoring Intermittent   Pulse Ox device source Patient   O2 Sat comments has not checked since d/c   O2 Sat: education provided Sat levels, Monitoring frequency, When to seek care   O2 Sat education comments advised to check at least 4x's/day, go to ED for unresolving O2 sats <90%   Is the patient/caregiver able to teach back steps to  recovery at home? Set small, achievable goals for return to baseline health, Rest and rebuild strength, gradually increase activity, Eat a well-balance diet, Practice good oral hygiene   If the patient is a current smoker, are they able to teach back resources for cessation? Not a smoker   Is the patient/caregiver able to teach back the hierarchy of who to call/visit for symptoms/problems? PCP, Specialist, Home health nurse, Urgent Care, ED, 911 Yes   COVID-19 call completed? Yes          RUSH JOHNSON - Registered Nurse

## 2022-06-13 NOTE — CASE MANAGEMENT/SOCIAL WORK
Case Management Discharge Note      Final Note: Home         Selected Continued Care - Discharged on 6/12/2022 Admission date: 6/8/2022 - Discharge disposition: Home or Self Care    Destination    No services have been selected for the patient.              Durable Medical Equipment    No services have been selected for the patient.              Dialysis/Infusion    No services have been selected for the patient.              Home Medical Care    No services have been selected for the patient.              Therapy    No services have been selected for the patient.              Community Resources    No services have been selected for the patient.              Community & DME    No services have been selected for the patient.                  Transportation Services  Private: Car    Final Discharge Disposition Code: 01 - home or self-care

## 2022-06-15 ENCOUNTER — READMISSION MANAGEMENT (OUTPATIENT)
Dept: CALL CENTER | Facility: HOSPITAL | Age: 67
End: 2022-06-15

## 2022-06-15 NOTE — OUTREACH NOTE
COVID-19 Week 1 Survey    Flowsheet Row Responses   [facility] patient discharged from? [place]   Does the patient have one of the following disease processes/diagnoses(primary or secondary)? COVID-19   COVID-19 underlying condition? None   Call Number Call 2   Week 1 Call successful? No   Discharge diagnosis PNA d/t COVID-19, Nonischemic cardiomyopathy, hyponatremia,           TOD JOHNSON - Registered Nurse

## 2022-06-18 ENCOUNTER — READMISSION MANAGEMENT (OUTPATIENT)
Dept: CALL CENTER | Facility: HOSPITAL | Age: 67
End: 2022-06-18

## 2022-06-18 NOTE — OUTREACH NOTE
COVID-19 Week 2 Survey    Flowsheet Row Responses   [facility] facility patient discharged from? [place]   Does the patient have one of the following disease processes/diagnoses(primary or secondary)? COVID-19   COVID-19 underlying condition? None   Call Number Call 1   COVID-19 Week 2: Call 1 attempt successful? No   Discharge diagnosis PNA d/t COVID-19, Nonischemic cardiomyopathy, hyponatremia,           GENEVA T - Registered Nurse

## 2022-06-21 ENCOUNTER — HOSPITAL ENCOUNTER (OUTPATIENT)
Dept: CARDIOLOGY | Facility: HOSPITAL | Age: 67
Discharge: HOME | End: 2022-06-21
Admitting: INTERNAL MEDICINE

## 2022-06-21 VITALS
HEIGHT: 68 IN | SYSTOLIC BLOOD PRESSURE: 144 MMHG | BODY MASS INDEX: 21.52 KG/M2 | WEIGHT: 142 LBS | DIASTOLIC BLOOD PRESSURE: 82 MMHG | HEART RATE: 70 BPM

## 2022-06-21 DIAGNOSIS — I42.8 NICM (NONISCHEMIC CARDIOMYOPATHY): ICD-10-CM

## 2022-06-21 PROCEDURE — 93306 TTE W/DOPPLER COMPLETE: CPT

## 2022-06-21 PROCEDURE — 93306 TTE W/DOPPLER COMPLETE: CPT | Performed by: INTERNAL MEDICINE

## 2022-06-22 ENCOUNTER — TELEPHONE (OUTPATIENT)
Dept: CARDIOLOGY | Facility: CLINIC | Age: 67
End: 2022-06-22

## 2022-06-22 LAB
AORTIC DIMENSIONLESS INDEX: 0.8 (DI)
BH CV ECHO MEAS - ACS: 1.73 CM
BH CV ECHO MEAS - AO MAX PG: 7.3 MMHG
BH CV ECHO MEAS - AO MEAN PG: 4 MMHG
BH CV ECHO MEAS - AO V2 MAX: 135 CM/SEC
BH CV ECHO MEAS - AO V2 VTI: 28.3 CM
BH CV ECHO MEAS - AVA(I,D): 2.25 CM2
BH CV ECHO MEAS - EDV(CUBED): 69.9 ML
BH CV ECHO MEAS - EDV(MOD-SP2): 101 ML
BH CV ECHO MEAS - EDV(MOD-SP4): 74 ML
BH CV ECHO MEAS - EF(MOD-BP): 59 %
BH CV ECHO MEAS - EF(MOD-SP2): 65.3 %
BH CV ECHO MEAS - EF(MOD-SP4): 52.7 %
BH CV ECHO MEAS - ESV(CUBED): 22.6 ML
BH CV ECHO MEAS - ESV(MOD-SP2): 35 ML
BH CV ECHO MEAS - ESV(MOD-SP4): 35 ML
BH CV ECHO MEAS - FS: 31.4 %
BH CV ECHO MEAS - IVS/LVPW: 1.11 CM
BH CV ECHO MEAS - IVSD: 1.03 CM
BH CV ECHO MEAS - LAT PEAK E' VEL: 11.9 CM/SEC
BH CV ECHO MEAS - LV DIASTOLIC VOL/BSA (35-75): 41.9 CM2
BH CV ECHO MEAS - LV MASS(C)D: 128.4 GRAMS
BH CV ECHO MEAS - LV MAX PG: 3 MMHG
BH CV ECHO MEAS - LV MEAN PG: 1.57 MMHG
BH CV ECHO MEAS - LV SYSTOLIC VOL/BSA (12-30): 19.8 CM2
BH CV ECHO MEAS - LV V1 MAX: 87 CM/SEC
BH CV ECHO MEAS - LV V1 VTI: 21.2 CM
BH CV ECHO MEAS - LVIDD: 4.1 CM
BH CV ECHO MEAS - LVIDS: 2.8 CM
BH CV ECHO MEAS - LVOT AREA: 3 CM2
BH CV ECHO MEAS - LVOT DIAM: 1.96 CM
BH CV ECHO MEAS - LVPWD: 0.92 CM
BH CV ECHO MEAS - MED PEAK E' VEL: 8.7 CM/SEC
BH CV ECHO MEAS - MV A DUR: 0.12 SEC
BH CV ECHO MEAS - MV A MAX VEL: 57 CM/SEC
BH CV ECHO MEAS - MV DEC SLOPE: 485.4 CM/SEC2
BH CV ECHO MEAS - MV DEC TIME: 0.15 MSEC
BH CV ECHO MEAS - MV E MAX VEL: 80.1 CM/SEC
BH CV ECHO MEAS - MV E/A: 1.41
BH CV ECHO MEAS - MV MAX PG: 1.88 MMHG
BH CV ECHO MEAS - MV MEAN PG: 0.77 MMHG
BH CV ECHO MEAS - MV P1/2T: 41.8 MSEC
BH CV ECHO MEAS - MV V2 VTI: 20.4 CM
BH CV ECHO MEAS - MVA(P1/2T): 5.3 CM2
BH CV ECHO MEAS - MVA(VTI): 3.1 CM2
BH CV ECHO MEAS - PA ACC TIME: 0.1 SEC
BH CV ECHO MEAS - PA PR(ACCEL): 35 MMHG
BH CV ECHO MEAS - PA V2 MAX: 84.2 CM/SEC
BH CV ECHO MEAS - PULM A REVS DUR: 0.14 SEC
BH CV ECHO MEAS - PULM A REVS VEL: 22.9 CM/SEC
BH CV ECHO MEAS - PULM DIAS VEL: 34.8 CM/SEC
BH CV ECHO MEAS - PULM S/D: 0.85
BH CV ECHO MEAS - PULM SYS VEL: 29.5 CM/SEC
BH CV ECHO MEAS - RAP SYSTOLE: 3 MMHG
BH CV ECHO MEAS - RV MAX PG: 0.66 MMHG
BH CV ECHO MEAS - RV V1 MAX: 40.7 CM/SEC
BH CV ECHO MEAS - RV V1 VTI: 8.8 CM
BH CV ECHO MEAS - RVSP: 18.6 MMHG
BH CV ECHO MEAS - SI(MOD-SP2): 37.4 ML/M2
BH CV ECHO MEAS - SI(MOD-SP4): 22.1 ML/M2
BH CV ECHO MEAS - SV(LVOT): 63.8 ML
BH CV ECHO MEAS - SV(MOD-SP2): 66 ML
BH CV ECHO MEAS - SV(MOD-SP4): 39 ML
BH CV ECHO MEAS - TAPSE (>1.6): 2.14 CM
BH CV ECHO MEAS - TR MAX PG: 15.6 MMHG
BH CV ECHO MEAS - TR MAX VEL: 197.8 CM/SEC
BH CV ECHO MEASUREMENTS AVERAGE E/E' RATIO: 7.78
BH CV VAS BP RIGHT ARM: NORMAL MMHG
BH CV XLRA - RV BASE: 3.3 CM
BH CV XLRA - RV LENGTH: 7.3 CM
BH CV XLRA - RV MID: 2.08 CM
BH CV XLRA - TDI S': 12.2 CM/SEC
LEFT ATRIUM VOLUME INDEX: 23.6 ML/M2
MAXIMAL PREDICTED HEART RATE: 153 BPM
SINUS: 2.8 CM
STRESS TARGET HR: 130 BPM

## 2022-06-27 ENCOUNTER — READMISSION MANAGEMENT (OUTPATIENT)
Dept: CALL CENTER | Facility: HOSPITAL | Age: 67
End: 2022-06-27

## 2022-06-27 ENCOUNTER — TELEPHONE (OUTPATIENT)
Dept: OBSTETRICS AND GYNECOLOGY | Age: 67
End: 2022-06-27

## 2022-06-27 NOTE — TELEPHONE ENCOUNTER
Please call back, I do not do bioidentical hormone replacement or order hormone labs routinely. And I generally do not recommend the use of hormone replacement therapy over age 65 unless if a patient has been on these medications and has debilitating hot flashes without them    Zeynep Phan MD  6/27/2022  12:44 EDT

## 2022-06-27 NOTE — OUTREACH NOTE
COVID-19 Week 3 Survey    Flowsheet Row Responses   [facility] facility patient discharged from? [place]   Does the patient have one of the following disease processes/diagnoses(primary or secondary)? COVID-19   COVID-19 underlying condition? None   Call Number Call 1   COVID-19 Week 3: Call 1 attempt successful? No  [Reached daughter, Deanna, who lives in [place]. States will notify patient of future f/u calls and to call if she has any questions/concerns.]          REBECCA WOLF - Registered Nurse

## 2022-06-27 NOTE — TELEPHONE ENCOUNTER
Caller: Sturgeon, Victoria H    Relationship to patient: Self    Best call back number: [phone]    Patient is needing: PT CALLED IN AS SHE HAS SOME QUESTIONS FOR DR. KELLER BEFORE SHE SEES ON HER 7/7/22. PT WANTS TO KNOW IF DR. KELLER DOES BIOIDENTICAL HORMONE REPLACEMENT AS AN OPTION. IF SHE DOES PT WOULD LIKE TO HAVE AN HORMONE LAB PANEL DRAWN BEFORE SHE SEES HER TO KNOW WHICH DIRECTION THEY SHOULD GO IN. PT IS AVAILABLE ANYTIME FOR A CALL BACK AND DETAILED MESSAGE CAN BE LEFT IF SHE IS UNABLE TO ANSWER.

## 2022-06-28 ENCOUNTER — OFFICE VISIT (OUTPATIENT)
Dept: CARDIOLOGY | Facility: CLINIC | Age: 67
End: 2022-06-28

## 2022-06-28 VITALS
WEIGHT: 158 LBS | HEIGHT: 68 IN | HEART RATE: 70 BPM | DIASTOLIC BLOOD PRESSURE: 72 MMHG | BODY MASS INDEX: 23.95 KG/M2 | SYSTOLIC BLOOD PRESSURE: 122 MMHG

## 2022-06-28 DIAGNOSIS — I10 PRIMARY HYPERTENSION: Chronic | ICD-10-CM

## 2022-06-28 DIAGNOSIS — I42.8 NICM (NONISCHEMIC CARDIOMYOPATHY): Primary | Chronic | ICD-10-CM

## 2022-06-28 DIAGNOSIS — E78.5 DYSLIPIDEMIA: Chronic | ICD-10-CM

## 2022-06-28 PROCEDURE — 99214 OFFICE O/P EST MOD 30 MIN: CPT | Performed by: INTERNAL MEDICINE

## 2022-06-28 PROCEDURE — 93000 ELECTROCARDIOGRAM COMPLETE: CPT | Performed by: INTERNAL MEDICINE

## 2022-06-28 RX ORDER — DULOXETIN HYDROCHLORIDE 60 MG/1
60 CAPSULE, DELAYED RELEASE ORAL DAILY
COMMUNITY
Start: 2022-05-13

## 2022-06-28 RX ORDER — CARVEDILOL 25 MG/1
25 TABLET ORAL DAILY
COMMUNITY
Start: 2022-04-15

## 2022-06-28 RX ORDER — ALBUTEROL SULFATE 90 UG/1
2 AEROSOL, METERED RESPIRATORY (INHALATION) EVERY 4 HOURS
COMMUNITY
Start: 2022-06-07 | End: 2023-06-07

## 2022-06-28 NOTE — PROGRESS NOTES
"Chief Complaint  Cardiomyopathy    Subjective    History of Present Illness      I had the pleasure of seeing Ms. Sturgeon in the office today.  She is a very pleasant 67-year-old female with a history of nonischemic cardiomyopathy.  She was diagnosed in 2007.  Cardiac catheterization at that time was normal.  She did respond well to treatment.  Her most recent echocardiogram was  in June 2022 which revealed an ejection fraction of 57%.  She also has a history of essential hypertension and dyslipidemia.    She was recently admitted to the hospital with COVID-19 pneumonia.  She was also found to have bacterial pneumonia.  She was treated with remdesivir.  She is currently on oral steroids and does have some residual cough.  She states that she has been feeling well from a cardiac standpoint.  No chest discomfort.  No palpitations, dizziness or near syncope.  No lower extremity edema, orthopnea or paroxysmal nocturnal dyspnea.  He does have some mild shortness of breath as a residual from her pneumonia.  She states that she has started an exercise routine and is now completing her third week of continued exercise.    Objective   Vital Signs:   /72   Pulse 70   Ht 172.7 cm (68\")   Wt 71.7 kg (158 lb)   BMI 24.02 kg/m²     Constitutional:       Appearance: Healthy appearance. Not in distress.   Eyes:      Pupils: Pupils are equal, round, and reactive to light.   Neck:      Vascular: No JVR. JVD normal.   Pulmonary:      Effort: Pulmonary effort is normal.      Breath sounds: Normal breath sounds. No wheezing. No rhonchi. No rales.   Chest:      Chest wall: Not tender to palpatation.   Cardiovascular:      PMI at left midclavicular line. Normal rate. Regular rhythm. Normal S1. Normal S2.      Murmurs: There is no murmur.      No gallop. No click. No rub.   Pulses:     Intact distal pulses.   Edema:     Peripheral edema absent.   Abdominal:      General: Bowel sounds are normal.      Palpations: Abdomen is soft. "      Tenderness: There is no abdominal tenderness.   Musculoskeletal: Normal range of motion.         General: No tenderness. Skin:     General: Skin is warm and dry.   Neurological:      General: No focal deficit present.      Mental Status: Alert and oriented to person, place and time.         Result Review :        Component   Ref Range & Units 7 d ago   Sodium   135 - 143 mmol/L 129 Low     Potassium   3.5 - 4.8 mmol/L 3.9    Chloride   98 - 107 mmol/L 92 Low     CO2   23.0 - 32.0 mMole/Liter 25.4    Anion Gap   2.0 - 11.0 11.6 High     Calcium   8.9 - 10.3 mg/dL 8.7 Low     Glucose   70 - 110 mg/dL 79    BUN   6 - 20 mg/dL 13    Creatinine   0.44 - 1.03 mg/dL 0.57    BUN/Creatinine Ratio  23    Albumin   3.3 - 4.5 Gram/dL 3.3    Total Protein   5.9 - 7.5 Gram/dL 6.4    A/G Ratio   1.0 - 1.7 1.1    Alkaline Phosphatase   25 - 105 Units/Liter 70    ALT (SGPT)   3 - 37 Units/Liter 24    AST   8 - 34 Units/Liter 25    Total Bilirubin   0.2 - 1.0 mg/dL 0.5    eGFR   >=60     EGFR NON-AFR. AMERICAN   >=60     Resulting Agency [facility]   Specimen Collected: 06/21/22 16:03 Last Resulted: 06/21/22 17:01   Received From: [facility]  Result Received: 06/27/22 10:41    Received Information      Order Questions                         Lab Component [facility] Guide    COMPREHENSIVE METABOLIC PANEL (Order #[id number]) on 6/21/22     Component   Ref Range & Units 7 d ago 2 mo ago 1 yr ago   Triglycerides   10 - 190 mg/dL 137  133 R, CM  106 R    HDL Cholesterol   55 - 110 mg/dL 61  58 R, CM  71 High  R    Total Cholesterol   120 - 200 mg/dL 142  172 R, CM  142 R    Non HDL Cholesterol (LDL+VLDL)   mg/dL mg/dL 27  114 R, CM     Comment: Calculated by Discern Rule GL_CHEM_TRIG_CMNT   Non HDL Cholesterol (LDL+VLDL)   0.0 - 130.0 mg/dL 53.6      Comment: Calculated by Discern Rule GL_CHEM_TRIG_CMNT   Chol/HDL Ratio   0.00 - 4.49 Ratio 2.33      LDL/HDL RATIO   0 - 4 Ratio 1   0.70 R    Comment:  Calculated by Discern Rule GL_CHEM_TRIG_CMNT            ECG 12 Lead    Date/Time: 6/28/2022 3:38 PM  Performed by: Eden Booker MD  Authorized by: Eden Booker MD   Comparison: compared with previous ECG from 12/30/2019  Similar to previous ECG  Comments: Normal sinus rhythm.  Normal EKG              Assessment and Plan    1. NICM (nonischemic cardiomyopathy) (HCC)  She is doing well from a cardiac standpoint.  Her most recent ejection fraction was 59%.  She is on carvedilol and rosuvastatin.    2. Primary hypertension  Her blood pressure is currently controlled.  If her blood pressure does increase, would consider adding an ACE inhibitor or ARB in view of her previous history of nonischemic cardiomyopathy.    3. Dyslipidemia  She is on rosuvastatin.  Her lipid profile is acceptable.        Follow Up   No follow-ups on file.  Patient was given instructions and counseling regarding her condition or for health maintenance advice. Please see specific information pulled into the AVS if appropriate.

## 2022-07-04 ENCOUNTER — READMISSION MANAGEMENT (OUTPATIENT)
Dept: CALL CENTER | Facility: HOSPITAL | Age: 67
End: 2022-07-04

## 2022-07-04 NOTE — OUTREACH NOTE
COVID-19 Week 4 Survey    Flowsheet Row Responses   [facility] facility patient discharged from? [place]   Does the patient have one of the following disease processes/diagnoses(primary or secondary)? COVID-19   COVID-19 underlying condition? AMI   Call Number Call 1   COVID-19 Week 4: Call 1 attempt successful? No   Discharge diagnosis PNA d/t COVID-19, Nonischemic cardiomyopathy, hyponatremia,           DARCI THOMPSON - Registered Nurse

## 2022-07-07 ENCOUNTER — TELEPHONE (OUTPATIENT)
Dept: OBSTETRICS AND GYNECOLOGY | Age: 67
End: 2022-07-07

## 2022-08-04 ENCOUNTER — TELEPHONE (OUTPATIENT)
Dept: CARDIOLOGY | Facility: CLINIC | Age: 67
End: 2022-08-04

## 2022-08-04 NOTE — TELEPHONE ENCOUNTER
CPA pt    Patient is scheduled on 08/09/2022 to see Dr Booker, she has been experiencing dizziness and chest discomfort for the past 1 to 2 days and was told by her IV therapy company that her blood pressure was low last week. Patient advised to go to ER but she would like to get advice from Dr. Booker.    # [phone]

## 2022-08-05 NOTE — TELEPHONE ENCOUNTER
If she is having chest discomfort and low blood pressures as reported, she should go to the emergency department.

## 2022-08-05 NOTE — TELEPHONE ENCOUNTER
Spoke with pt. Over the phone. Pt. Refuses to go to the emergency room.  I asked her to take a few BPs sitting and standing through out the day. Pt. Reports chest pain and dizziness but believes the chest pain may be due to acid reflux.  Pt. Discontinued PPI for several days due to inability to refill at pharmacy.    Pt reports that her brother is  from an MI which he believed to be acid reflux prior to the event.

## 2022-08-05 NOTE — TELEPHONE ENCOUNTER
Spoke with pt over the phone. She reported her orthostatic Bps that were taken by her home health nurse. They are as follows:     Sittin/40  82/60    Standin/60  98/62    Laying down: 110/68  Pulse: 80 BPM    Her home health nurse has said she will call me with more orthostatic Bps after she has completed IV infusion for today.

## 2022-08-05 NOTE — TELEPHONE ENCOUNTER
BP improved significantly after IV infusion. Pt states that after giving initial Bps, her Home Health Nurse realized she had been switched from Carvedilol 12.5 mg BID to Carvedilol 25mg BID without pt. Knowledge.     Spoke with pt over the phone again and advised her to go to the ED. The above message is just an FYI.

## 2022-08-09 ENCOUNTER — TELEMEDICINE (OUTPATIENT)
Dept: CARDIOLOGY | Facility: CLINIC | Age: 67
End: 2022-08-09

## 2022-08-09 VITALS — WEIGHT: 160 LBS | HEIGHT: 68 IN | BODY MASS INDEX: 24.25 KG/M2

## 2022-08-09 DIAGNOSIS — I42.8 NICM (NONISCHEMIC CARDIOMYOPATHY): Primary | Chronic | ICD-10-CM

## 2022-08-09 DIAGNOSIS — I10 PRIMARY HYPERTENSION: Chronic | ICD-10-CM

## 2022-08-09 PROCEDURE — 99442 PR PHYS/QHP TELEPHONE EVALUATION 11-20 MIN: CPT | Performed by: INTERNAL MEDICINE

## 2022-08-09 RX ORDER — MELATONIN 10 MG
1 CAPSULE ORAL
COMMUNITY

## 2022-08-09 RX ORDER — DIPHENHYDRAMINE HCL, IBUPROFEN 25; 200 MG/1; MG/1
1 CAPSULE, LIQUID FILLED ORAL NIGHTLY
COMMUNITY

## 2022-08-09 NOTE — PROGRESS NOTES
"Chief Complaint  Follow-up, Dizziness, and Hypertension    Subjective    History of Present Illness      You have chosen to receive care through a telephone visit. Do you consent to use a telephone visit for your medical care today? Yes    I had a telephone visit with Ms. Sturgeon today.  She is a very pleasant 67-year-old female with a history of nonischemic cardiomyopathy.  She was diagnosed in 2007.  Cardiac catheterization at that time was normal.  She did respond well to treatment.  Her most recent echocardiogram was  in June 2022 which revealed an ejection fraction of 57%.  She also has a history of essential hypertension and dyslipidemia.    Matilde has had a slow recovery from COVID-19.  Over the past 2 to 3 weeks she has been feeling particularly fatigued.  She has had some atypical type chest discomfort.  She states her blood pressure was low on one of her doctors appointments and she has been having dizzy episodes.  She states she started paying attention to her medication and she had been taking carvedilol 25 mg and sometimes 50 mg.  In the past she had taken a total of 25 mg daily, but states that more recently 25 mg twice daily.  She states that on Sunday she will take 50 mg in the morning because she was afraid that she would forget her 25 mg tablet in the evening.  We have discussed that that she should not take a medication that is dosed twice daily at 1 time.      Matilde is feeling somewhat better today.  She states that her energy level is slightly improved.  She is not complaining of chest discomfort.  She is not complaining of palpitations.  Her dizziness has improved.  She did purchase a blood pressure cuff but has not used it as yet.  She is going to start monitoring her blood pressure and she will give me a call in the next couple of weeks to come in for a visit.  For the present time she is going to take her carvedilol 12.5 mg twice daily    Objective   Vital Signs:   Ht 172.7 cm (68\")   " Wt 72.6 kg (160 lb)   BMI 24.33 kg/m²     Physical Exam   No physical exam secondary to telephone visit  Result Review :                 Assessment and Plan    1. NICM (nonischemic cardiomyopathy) (HCC)  Stable    2. Primary hypertension  She states that her blood pressure has been low recently and she has been experiencing dizziness.  She has been taking her carvedilol incorrectly.  She is going to start taking the medication 12.5 mg twice daily.  She will record her blood pressures and let me know the readings in a couple of weeks    Length of telephone visit was 15 minutes        Follow Up   No follow-ups on file.  Patient was given instructions and counseling regarding her condition or for health maintenance advice. Please see specific information pulled into the AVS if appropriate.

## 2022-08-18 ENCOUNTER — HOSPITAL ENCOUNTER (EMERGENCY)
Facility: HOSPITAL | Age: 67
Discharge: HOME | End: 2022-08-18
Attending: EMERGENCY MEDICINE | Admitting: EMERGENCY MEDICINE

## 2022-08-18 ENCOUNTER — APPOINTMENT (OUTPATIENT)
Dept: CT IMAGING | Facility: HOSPITAL | Age: 67
End: 2022-08-18

## 2022-08-18 VITALS
HEIGHT: 68 IN | RESPIRATION RATE: 16 BRPM | TEMPERATURE: 97.9 F | BODY MASS INDEX: 24.33 KG/M2 | SYSTOLIC BLOOD PRESSURE: 130 MMHG | OXYGEN SATURATION: 100 % | HEART RATE: 97 BPM | DIASTOLIC BLOOD PRESSURE: 74 MMHG

## 2022-08-18 DIAGNOSIS — S02.2XXA CLOSED FRACTURE OF NASAL BONE, INITIAL ENCOUNTER: ICD-10-CM

## 2022-08-18 DIAGNOSIS — F10.920 ALCOHOLIC INTOXICATION WITHOUT COMPLICATION: Primary | ICD-10-CM
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
## 2022-08-18 DIAGNOSIS — R93.0 ABNORMAL CT SCAN OF HEAD: ICD-10-CM

## 2022-08-18 DIAGNOSIS — S01.21XA NASAL LACERATION, INITIAL ENCOUNTER: ICD-10-CM

## 2022-08-18 DIAGNOSIS — U07.1 COVID-19: ICD-10-CM

## 2022-08-18 LAB
ALBUMIN SERPL-MCNC: 3.8 G/DL (ref 3.5–5.2)
ALBUMIN/GLOB SERPL: 2 G/DL
ALP SERPL-CCNC: 126 U/L (ref 39–117)
ALT SERPL W P-5'-P-CCNC: 24 U/L (ref 1–33)
AMPHET+METHAMPHET UR QL: NEGATIVE
ANION GAP SERPL CALCULATED.3IONS-SCNC: 8.5 MMOL/L (ref 5–15)
AST SERPL-CCNC: 27 U/L (ref 1–32)
BACTERIA UR QL AUTO: ABNORMAL /HPF
BARBITURATES UR QL SCN: NEGATIVE
BASOPHILS # BLD AUTO: 0.04 10*3/MM3 (ref 0–0.2)
BASOPHILS NFR BLD AUTO: 0.7 % (ref 0–1.5)
BENZODIAZ UR QL SCN: NEGATIVE
BILIRUB SERPL-MCNC: 0.2 MG/DL (ref 0–1.2)
BILIRUB UR QL STRIP: NEGATIVE
BUN SERPL-MCNC: 10 MG/DL (ref 8–23)
BUN/CREAT SERPL: 15.4 (ref 7–25)
CALCIUM SPEC-SCNC: 8.1 MG/DL (ref 8.6–10.5)
CANNABINOIDS SERPL QL: NEGATIVE
CHLORIDE SERPL-SCNC: 96 MMOL/L (ref 98–107)
CLARITY UR: CLEAR
CO2 SERPL-SCNC: 25.5 MMOL/L (ref 22–29)
COCAINE UR QL: NEGATIVE
COLOR UR: YELLOW
CREAT SERPL-MCNC: 0.65 MG/DL (ref 0.57–1)
DEPRECATED RDW RBC AUTO: 49 FL (ref 37–54)
EGFRCR SERPLBLD CKD-EPI 2021: 96.6 ML/MIN/1.73
EOSINOPHIL # BLD AUTO: 0.31 10*3/MM3 (ref 0–0.4)
EOSINOPHIL NFR BLD AUTO: 5.6 % (ref 0.3–6.2)
ERYTHROCYTE [DISTWIDTH] IN BLOOD BY AUTOMATED COUNT: 14 % (ref 12.3–15.4)
ETHANOL BLD-MCNC: 325 MG/DL (ref 0–10)
ETHANOL UR QL: 0.33 %
GLOBULIN UR ELPH-MCNC: 1.9 GM/DL
GLUCOSE SERPL-MCNC: 88 MG/DL (ref 65–99)
GLUCOSE UR STRIP-MCNC: NEGATIVE MG/DL
HCT VFR BLD AUTO: 34.3 % (ref 34–46.6)
HGB BLD-MCNC: 11.4 G/DL (ref 12–15.9)
HGB UR QL STRIP.AUTO: NEGATIVE
HYALINE CASTS UR QL AUTO: ABNORMAL /LPF
IMM GRANULOCYTES # BLD AUTO: 0.02 10*3/MM3 (ref 0–0.05)
IMM GRANULOCYTES NFR BLD AUTO: 0.4 % (ref 0–0.5)
INR PPP: 0.98 (ref 0.9–1.1)
KETONES UR QL STRIP: NEGATIVE
LEUKOCYTE ESTERASE UR QL STRIP.AUTO: ABNORMAL
LYMPHOCYTES # BLD AUTO: 2.51 10*3/MM3 (ref 0.7–3.1)
LYMPHOCYTES NFR BLD AUTO: 45 % (ref 19.6–45.3)
MAGNESIUM SERPL-MCNC: 1.6 MG/DL (ref 1.6–2.4)
MCH RBC QN AUTO: 32.5 PG (ref 26.6–33)
MCHC RBC AUTO-ENTMCNC: 33.2 G/DL (ref 31.5–35.7)
MCV RBC AUTO: 97.7 FL (ref 79–97)
METHADONE UR QL SCN: NEGATIVE
MONOCYTES # BLD AUTO: 0.69 10*3/MM3 (ref 0.1–0.9)
MONOCYTES NFR BLD AUTO: 12.4 % (ref 5–12)
NEUTROPHILS NFR BLD AUTO: 2.01 10*3/MM3 (ref 1.7–7)
NEUTROPHILS NFR BLD AUTO: 35.9 % (ref 42.7–76)
NITRITE UR QL STRIP: NEGATIVE
NRBC BLD AUTO-RTO: 0 /100 WBC (ref 0–0.2)
OPIATES UR QL: POSITIVE
OXYCODONE UR QL SCN: NEGATIVE
PH UR STRIP.AUTO: 7 [PH] (ref 5–8)
PLATELET # BLD AUTO: 265 10*3/MM3 (ref 140–450)
PMV BLD AUTO: 9.3 FL (ref 6–12)
POTASSIUM SERPL-SCNC: 4.1 MMOL/L (ref 3.5–5.2)
PROT SERPL-MCNC: 5.7 G/DL (ref 6–8.5)
PROT UR QL STRIP: NEGATIVE
PROTHROMBIN TIME: 12.9 SECONDS (ref 11.7–14.2)
RBC # BLD AUTO: 3.51 10*6/MM3 (ref 3.77–5.28)
RBC # UR STRIP: ABNORMAL /HPF
REF LAB TEST METHOD: ABNORMAL
SARS-COV-2 RNA RESP QL NAA+PROBE: DETECTED
SODIUM SERPL-SCNC: 130 MMOL/L (ref 136–145)
SP GR UR STRIP: <=1.005 (ref 1–1.03)
SQUAMOUS #/AREA URNS HPF: ABNORMAL /HPF
UROBILINOGEN UR QL STRIP: ABNORMAL
WBC # UR STRIP: ABNORMAL /HPF
WBC NRBC COR # BLD: 5.58 10*3/MM3 (ref 3.4–10.8)

## 2022-08-18 PROCEDURE — 80307 DRUG TEST PRSMV CHEM ANLYZR: CPT | Performed by: NURSE PRACTITIONER

## 2022-08-18 PROCEDURE — 80053 COMPREHEN METABOLIC PANEL: CPT | Performed by: NURSE PRACTITIONER

## 2022-08-18 PROCEDURE — 96374 THER/PROPH/DIAG INJ IV PUSH: CPT

## 2022-08-18 PROCEDURE — 93010 ELECTROCARDIOGRAM REPORT: CPT | Performed by: INTERNAL MEDICINE

## 2022-08-18 PROCEDURE — 82077 ASSAY SPEC XCP UR&BREATH IA: CPT | Performed by: NURSE PRACTITIONER

## 2022-08-18 PROCEDURE — 70486 CT MAXILLOFACIAL W/O DYE: CPT

## 2022-08-18 PROCEDURE — 81001 URINALYSIS AUTO W/SCOPE: CPT | Performed by: NURSE PRACTITIONER

## 2022-08-18 PROCEDURE — U0003 INFECTIOUS AGENT DETECTION BY NUCLEIC ACID (DNA OR RNA); SEVERE ACUTE RESPIRATORY SYNDROME CORONAVIRUS 2 (SARS-COV-2) (CORONAVIRUS DISEASE [COVID-19]), AMPLIFIED PROBE TECHNIQUE, MAKING USE OF HIGH THROUGHPUT TECHNOLOGIES AS DESCRIBED BY CMS-2020-01-R: HCPCS | Performed by: NURSE PRACTITIONER

## 2022-08-18 PROCEDURE — 36415 COLL VENOUS BLD VENIPUNCTURE: CPT

## 2022-08-18 PROCEDURE — 85610 PROTHROMBIN TIME: CPT | Performed by: NURSE PRACTITIONER

## 2022-08-18 PROCEDURE — 93005 ELECTROCARDIOGRAM TRACING: CPT | Performed by: NURSE PRACTITIONER

## 2022-08-18 PROCEDURE — 25010000002 ONDANSETRON PER 1 MG: Performed by: NURSE PRACTITIONER

## 2022-08-18 PROCEDURE — 70450 CT HEAD/BRAIN W/O DYE: CPT

## 2022-08-18 PROCEDURE — 96376 TX/PRO/DX INJ SAME DRUG ADON: CPT

## 2022-08-18 PROCEDURE — 72125 CT NECK SPINE W/O DYE: CPT

## 2022-08-18 PROCEDURE — 85025 COMPLETE CBC W/AUTO DIFF WBC: CPT | Performed by: NURSE PRACTITIONER

## 2022-08-18 PROCEDURE — 83735 ASSAY OF MAGNESIUM: CPT | Performed by: NURSE PRACTITIONER

## 2022-08-18 PROCEDURE — U0005 INFEC AGEN DETEC AMPLI PROBE: HCPCS | Performed by: NURSE PRACTITIONER

## 2022-08-18 PROCEDURE — 25010000002 MORPHINE PER 10 MG: Performed by: NURSE PRACTITIONER

## 2022-08-18 PROCEDURE — 96375 TX/PRO/DX INJ NEW DRUG ADDON: CPT

## 2022-08-18 PROCEDURE — 99284 EMERGENCY DEPT VISIT MOD MDM: CPT

## 2022-08-18 RX ORDER — MORPHINE SULFATE 2 MG/ML
4 INJECTION, SOLUTION INTRAMUSCULAR; INTRAVENOUS ONCE
Status: COMPLETED | OUTPATIENT
Start: 2022-08-18 | End: 2022-08-18

## 2022-08-18 RX ORDER — SODIUM CHLORIDE 0.9 % (FLUSH) 0.9 %
10 SYRINGE (ML) INJECTION AS NEEDED
Status: DISCONTINUED | OUTPATIENT
Start: 2022-08-18 | End: 2022-08-19 | Stop reason: HOSPADM

## 2022-08-18 RX ORDER — HYDROCODONE BITARTRATE AND ACETAMINOPHEN 5; 325 MG/1; MG/1
1 TABLET ORAL EVERY 6 HOURS PRN
Qty: 8 TABLET | Refills: 0 | Status: SHIPPED | OUTPATIENT
Start: 2022-08-18

## 2022-08-18 RX ORDER — LIDOCAINE HYDROCHLORIDE AND EPINEPHRINE 10; 10 MG/ML; UG/ML
10 INJECTION, SOLUTION INFILTRATION; PERINEURAL ONCE
Status: COMPLETED | OUTPATIENT
Start: 2022-08-18 | End: 2022-08-18

## 2022-08-18 RX ORDER — MORPHINE SULFATE 2 MG/ML
2 INJECTION, SOLUTION INTRAMUSCULAR; INTRAVENOUS ONCE
Status: COMPLETED | OUTPATIENT
Start: 2022-08-18 | End: 2022-08-18

## 2022-08-18 RX ORDER — SULFAMETHOXAZOLE AND TRIMETHOPRIM 800; 160 MG/1; MG/1
1 TABLET ORAL 2 TIMES DAILY
Qty: 14 TABLET | Refills: 0 | Status: SHIPPED | OUTPATIENT
Start: 2022-08-18 | End: 2022-08-25

## 2022-08-18 RX ORDER — ONDANSETRON 2 MG/ML
4 INJECTION INTRAMUSCULAR; INTRAVENOUS ONCE
Status: COMPLETED | OUTPATIENT
Start: 2022-08-18 | End: 2022-08-18

## 2022-08-18 RX ORDER — ACETAMINOPHEN 500 MG
1000 TABLET ORAL ONCE
Status: COMPLETED | OUTPATIENT
Start: 2022-08-18 | End: 2022-08-18

## 2022-08-18 RX ADMIN — LIDOCAINE HYDROCHLORIDE AND EPINEPHRINE 10 ML: 10; 10 INJECTION, SOLUTION INFILTRATION; PERINEURAL at 20:50

## 2022-08-18 RX ADMIN — ACETAMINOPHEN 1000 MG: 500 TABLET, FILM COATED ORAL at 23:36

## 2022-08-18 RX ADMIN — ONDANSETRON 4 MG: 2 INJECTION INTRAMUSCULAR; INTRAVENOUS at 20:46

## 2022-08-18 RX ADMIN — MORPHINE SULFATE 4 MG: 2 INJECTION, SOLUTION INTRAMUSCULAR; INTRAVENOUS at 21:24

## 2022-08-18 RX ADMIN — MORPHINE SULFATE 2 MG: 2 INJECTION, SOLUTION INTRAMUSCULAR; INTRAVENOUS at 20:46

## 2022-08-18 NOTE — ED TRIAGE NOTES
.Pt masked on arrival, staff masked    Pt from home via ems, reports etoh today, trip and fall on stone patio, laceration to bridge of nose, bit bottom lip, pain all over, denies loc, fall witnessed by , not on blood thinners; c-collar per ems

## 2022-08-18 NOTE — ED PROVIDER NOTES
EMERGENCY DEPARTMENT ENCOUNTER    Room Number:  01/01  Date seen:  8/18/2022  Time seen: 19:57 EDT  PCP: Deana Wilcox MD  Historian: pt, EMS    HPI:  Chief complaint:fall, CHI, intoxicated  A complete HPI/ROS/PMH/PSH/SH/FH are unobtainable due to: intoxicated  Context:Victoria H Sturgeon is a 67 y.o. female with history of non-ischemic cardiomyopathy, electrolyte imbalance, alcohol abuse, metabolic encephalopathy and hypertension who presents to the ED with c/o a trip and fall on her patio this evening.  She denies LOC, is complaining of all over body pain and has lacerations to her face.  She states she drank 2 white claws today.  Her problem is not made better/worse by anything. She denies any numbness or tingling to her extremities.  She arrived per EMS on LSB and in cervical collar. She is not on blood thinners.      Patient was placed in face mask in first look. Patient was wearing facemask when I entered the room and throughout our encounter. I wore full protective equipment throughout this patient encounter including a N95 face mask, eye shield and gloves. Hand hygiene/washing of hands was performed before donning protective equipment and after removal when leaving the room.      MEDICAL RECORD REVIEW    ALLERGIES  Cefdinir, Latex, Amoxicillin-pot clavulanate, and Eggs or egg-derived products    PAST MEDICAL HISTORY  Active Ambulatory Problems     Diagnosis Date Noted   • Acute renal failure (HCC) 05/03/2016   • Diverticulitis of intestine 05/02/2016   • Hypertension 04/06/2017   • Hyponatremia 05/03/2016   • Macrocytic anemia 05/03/2016   • Metabolic acidosis 05/03/2016   • Vulvitis 04/06/2017   • Dyslipidemia 12/30/2019   • NICM (nonischemic cardiomyopathy) ([facility]) 12/30/2019   • Dense breast tissue on mammogram 01/07/2020   • Lichen sclerosus of female genitalia 07/15/2020   • Alcohol abuse 01/08/2021   • Metabolic encephalopathy 01/08/2021   • Dehydration 01/08/2021   • COVID-19 06/08/2022   •  Pneumonia due to COVID-19 virus 2022   • Lobar pneumonia (HCC) 2022     Resolved Ambulatory Problems     Diagnosis Date Noted   • No Resolved Ambulatory Problems     Past Medical History:   Diagnosis Date   • Abnormal LFTs (liver function tests)    • Abnormality on patient-activated cardiac event recorder    • Acute pain of left foot    • Aphthous ulcer of tongue    • Cardiomyopathy (HCC)    • Cervical radiculitis 2016   • Chest discomfort    • Chondromalacia patellae, right knee 2017   • Colon cancer screening    • Contusion of right knee 2017   • Degeneration of cervical intervertebral disc    • Degenerative joint disease 2016   • Depression 2015   • Encounter for long-term (current) use of high-risk medication    • Encounter for screening colonoscopy    • Folate deficiency    • H/O cancer antigen 125 (CA-125) measurement    • H/O Doppler ultrasound    • History of alcohol abuse 2018   • History of bone density study 2018   • History of cardiomyopathy    • History of Papanicolaou smear of cervix    • HPV test positive    • Hyperlipidemia    • Hypothyroidism    • Internal hemorrhoid    • Menopause    • Neck pain    • Normal coronary arteries    • Osteopenia 2018   • Pharyngeal abscess    • Sinusitis    • Spinal stenosis 2016   • Sprain of sacroiliac joint 2017   • Staphylococcal infection    • Trigeminal neuralgia    • Urge incontinence    • Vitamin B 12 deficiency        PAST SURGICAL HISTORY  Past Surgical History:   Procedure Laterality Date   • CATARACT EXTRACTION Bilateral 2016   •  SECTION  1995    Baby Girl BERNA ([facility] )   • CHOLECYSTECTOMY  1963   • COLONOSCOPY  2014    Diverticulosis sigmoid, internal hemorrhoids   • HERNIA REPAIR      Age 19   • KNEE ARTHROSCOPY INCISION AND DRAINAGE OF KNEE Right  &     after fall   • SINUS SURGERY      X 2 in  and in        FAMILY  HISTORY  Family History   Problem Relation Age of Onset   • Lupus Mother         Systemic Lupus Erythematosus (  in her sleep @ 73)   • Leukemia Father         CLL Had it for 14 years.   • Colon polyps Other         Family History   • Colon cancer Other         Family History   • Other Sister         breast lumpectomy   • No Known Problems Brother    • Scoliosis Daughter    • Heart disease Maternal Grandmother    • Anuerysm Maternal Grandmother 80   • No Known Problems Paternal Grandmother         90's   • Breast cancer Paternal Aunt 78   • No Known Problems Maternal Grandfather    • No Known Problems Paternal Grandfather         90's   • No Known Problems Sister    • Diabetes type II Brother         Diet controlled    • BRCA 1/2 Neg Hx    • Endometrial cancer Neg Hx    • Ovarian cancer Neg Hx        SOCIAL HISTORY  Social History     Socioeconomic History   • Marital status:      Spouse name: CRISTINO   • Number of children: 1   Tobacco Use   • Smoking status: Never Smoker   • Smokeless tobacco: Never Used   Substance and Sexual Activity   • Alcohol use: Yes     Comment: no more than 2-3 drinks in a setting. hx of excessive alc use   • Drug use: No   • Sexual activity: Yes     Partners: Male     Comment: spouse = CRISTINO, with ED.       REVIEW OF SYSTEMS  Review of Systems   Unable to perform ROS: Other   Intoxication    PHYSICAL EXAM    ED Triage Vitals [22]   Temp Heart Rate Resp BP SpO2   97.9 °F (36.6 °C) 99 18 144/82 100 %      Temp src Heart Rate Source Patient Position BP Location FiO2 (%)   -- -- -- -- --     Physical Exam    I have reviewed the triage vital signs and nursing notes.      GENERAL: not distressed, slightly anxious  HENT: nares patent  EYES: no scleral icterus, PERRL  NECK: pt arrived immobilized in cervical collar  CV: regular rhythm, regular rate, no murmur  RESPIRATORY: normal effort, CTAB  ABDOMEN: soft  : deferred  MUSCULOSKELETAL: no deformity, no thoracic or lumbar pain.   I am able to easily ROM bilateral shoulders,elbows, wrists and hands.   NEURO: alert, moves all extremities, follows commands  SKIN: warm, dry    Laceration Repair    Date/Time: 8/18/2022 10:06 PM  Performed by: Isabela Hurd APRN  Authorized by: Ben Barajas MD     Consent:     Consent obtained:  Verbal    Consent given by:  Patient    Risks, benefits, and alternatives were discussed: yes      Risks discussed:  Pain, poor cosmetic result, poor wound healing, retained foreign body, infection and need for additional repair    Alternatives discussed:  No treatment  Universal protocol:     Patient identity confirmed:  Verbally with patient and arm band  Anesthesia:     Anesthesia method:  Local infiltration    Local anesthetic:  Lidocaine 1% w/o epi  Laceration details:     Location:  Face    Face location:  Nose (nose lower)    Length (cm):  1  Pre-procedure details:     Preparation:  Patient was prepped and draped in usual sterile fashion and imaging obtained to evaluate for foreign bodies  Exploration:     Limited defect created (wound extended): no      Hemostasis achieved with:  Direct pressure    Imaging obtained comment:  CT scan    Imaging outcome: foreign body noted      Wound exploration: wound explored through full range of motion and entire depth of wound visualized      Wound extent: foreign bodies/material and underlying fracture      Wound extent: no nerve damage noted and no tendon damage noted      Contaminated: yes    Treatment:     Area cleansed with:  Selam    Amount of cleaning:  Extensive    Irrigation solution:  Sterile saline    Irrigation volume:  50    Irrigation method:  Pressure wash    Visualized foreign bodies/material removed: yes      Debridement:  Moderate    Undermining:  None    Scar revision: no    Skin repair:     Repair method:  Sutures    Suture size:  6-0    Suture material:  Nylon    Suture technique:  Simple interrupted    Number of sutures:   7  Approximation:     Approximation:  Close  Repair type:     Repair type:  Intermediate  Post-procedure details:     Dressing:  Antibiotic ointment    Procedure completion:  Tolerated with difficulty  Laceration Repair    Date/Time: 8/18/2022 10:08 PM  Performed by: Isabela Hurd APRN  Authorized by: Ben Barajas MD     Consent:     Consent obtained:  Verbal    Consent given by:  Patient    Risks, benefits, and alternatives were discussed: yes      Risks discussed:  Infection, need for additional repair, pain, poor cosmetic result, poor wound healing and retained foreign body    Alternatives discussed:  No treatment  Universal protocol:     Patient identity confirmed:  Verbally with patient and arm band  Anesthesia:     Anesthesia method:  Local infiltration    Local anesthetic:  Lidocaine 1% w/o epi  Laceration details:     Location:  Face    Face location:  Nose (nose upper)    Length (cm):  2  Pre-procedure details:     Preparation:  Patient was prepped and draped in usual sterile fashion and imaging obtained to evaluate for foreign bodies  Exploration:     Limited defect created (wound extended): no      Hemostasis achieved with:  Direct pressure    Imaging obtained comment:  CT    Wound exploration: wound explored through full range of motion and entire depth of wound visualized      Wound extent: foreign bodies/material and underlying fracture      Wound extent: no muscle damage noted, no tendon damage noted and no vascular damage noted      Foreign bodies/material:  Dirt    Contaminated: yes    Treatment:     Area cleansed with:  Chlorhexidine    Amount of cleaning:  Extensive    Irrigation solution:  Sterile saline    Irrigation volume:  50    Irrigation method:  Pressure wash and syringe    Visualized foreign bodies/material removed: yes      Debridement:  Moderate    Undermining:  Minimal    Scar revision: no    Skin repair:     Repair method:  Sutures    Suture size:  6-0    Suture  material:  Nylon    Suture technique:  Simple interrupted    Number of sutures:  9  Approximation:     Approximation:  Close  Repair type:     Repair type:  Simple  Post-procedure details:     Dressing:  Antibiotic ointment    Procedure completion:  Tolerated with difficulty          LAB RESULTS  Recent Results (from the past 24 hour(s))   ECG 12 Lead    Collection Time: 08/18/22  8:10 PM   Result Value Ref Range    QT Interval 371 ms   Comprehensive Metabolic Panel    Collection Time: 08/18/22  8:14 PM    Specimen: Blood   Result Value Ref Range    Glucose 88 65 - 99 mg/dL    BUN 10 8 - 23 mg/dL    Creatinine 0.65 0.57 - 1.00 mg/dL    Sodium 130 (L) 136 - 145 mmol/L    Potassium 4.1 3.5 - 5.2 mmol/L    Chloride 96 (L) 98 - 107 mmol/L    CO2 25.5 22.0 - 29.0 mmol/L    Calcium 8.1 (L) 8.6 - 10.5 mg/dL    Total Protein 5.7 (L) 6.0 - 8.5 g/dL    Albumin 3.80 3.50 - 5.20 g/dL    ALT (SGPT) 24 1 - 33 U/L    AST (SGOT) 27 1 - 32 U/L    Alkaline Phosphatase 126 (H) 39 - 117 U/L    Total Bilirubin 0.2 0.0 - 1.2 mg/dL    Globulin 1.9 gm/dL    A/G Ratio 2.0 g/dL    BUN/Creatinine Ratio 15.4 7.0 - 25.0    Anion Gap 8.5 5.0 - 15.0 mmol/L    eGFR 96.6 >60.0 mL/min/1.73   Protime-INR    Collection Time: 08/18/22  8:14 PM    Specimen: Blood   Result Value Ref Range    Protime 12.9 11.7 - 14.2 Seconds    INR 0.98 0.90 - 1.10   Ethanol    Collection Time: 08/18/22  8:14 PM    Specimen: Blood   Result Value Ref Range    Ethanol 325 (H) 0 - 10 mg/dL    Ethanol % 0.325 %   Magnesium    Collection Time: 08/18/22  8:14 PM    Specimen: Blood   Result Value Ref Range    Magnesium 1.6 1.6 - 2.4 mg/dL   CBC Auto Differential    Collection Time: 08/18/22  8:14 PM    Specimen: Blood   Result Value Ref Range    WBC 5.58 3.40 - 10.80 10*3/mm3    RBC 3.51 (L) 3.77 - 5.28 10*6/mm3    Hemoglobin 11.4 (L) 12.0 - 15.9 g/dL    Hematocrit 34.3 34.0 - 46.6 %    MCV 97.7 (H) 79.0 - 97.0 fL    MCH 32.5 26.6 - 33.0 pg    MCHC 33.2 31.5 - 35.7 g/dL    RDW  14.0 12.3 - 15.4 %    RDW-SD 49.0 37.0 - 54.0 fl    MPV 9.3 6.0 - 12.0 fL    Platelets 265 140 - 450 10*3/mm3    Neutrophil % 35.9 (L) 42.7 - 76.0 %    Lymphocyte % 45.0 19.6 - 45.3 %    Monocyte % 12.4 (H) 5.0 - 12.0 %    Eosinophil % 5.6 0.3 - 6.2 %    Basophil % 0.7 0.0 - 1.5 %    Immature Grans % 0.4 0.0 - 0.5 %    Neutrophils, Absolute 2.01 1.70 - 7.00 10*3/mm3    Lymphocytes, Absolute 2.51 0.70 - 3.10 10*3/mm3    Monocytes, Absolute 0.69 0.10 - 0.90 10*3/mm3    Eosinophils, Absolute 0.31 0.00 - 0.40 10*3/mm3    Basophils, Absolute 0.04 0.00 - 0.20 10*3/mm3    Immature Grans, Absolute 0.02 0.00 - 0.05 10*3/mm3    nRBC 0.0 0.0 - 0.2 /100 WBC   Urinalysis With Culture If Indicated - Urine, Clean Catch    Collection Time: 08/18/22  8:17 PM    Specimen: Urine, Clean Catch   Result Value Ref Range    Color, UA Yellow Yellow, Straw    Appearance, UA Clear Clear    pH, UA 7.0 5.0 - 8.0    Specific Gravity, UA <=1.005 1.005 - 1.030    Glucose, UA Negative Negative    Ketones, UA Negative Negative    Bilirubin, UA Negative Negative    Blood, UA Negative Negative    Protein, UA Negative Negative    Leuk Esterase, UA Trace (A) Negative    Nitrite, UA Negative Negative    Urobilinogen, UA 0.2 E.U./dL 0.2 - 1.0 E.U./dL   Urine Drug Screen - Urine, Clean Catch    Collection Time: 08/18/22  8:17 PM    Specimen: Urine, Clean Catch   Result Value Ref Range    Amphet/Methamphet, Screen Negative Negative    Barbiturates Screen, Urine Negative Negative    Benzodiazepine Screen, Urine Negative Negative    Cocaine Screen, Urine Negative Negative    Opiate Screen Positive (A) Negative    THC, Screen, Urine Negative Negative    Methadone Screen, Urine Negative Negative    Oxycodone Screen, Urine Negative Negative   Urinalysis, Microscopic Only - Urine, Clean Catch    Collection Time: 08/18/22  8:17 PM    Specimen: Urine, Clean Catch   Result Value Ref Range    RBC, UA 0-2 None Seen, 0-2 /HPF    WBC, UA 3-5 (A) None Seen, 0-2 /HPF     Bacteria, UA 2+ (A) None Seen /HPF    Squamous Epithelial Cells, UA 0-2 None Seen, 0-2 /HPF    Hyaline Casts, UA None Seen None Seen /LPF    Methodology Automated Microscopy    COVID-19,[facility] IN-HOUSE CEPHEID/UDAY NP SWAB IN TRANSPORT MEDIA 8-12 HR TAT - Swab, Nasopharynx    Collection Time: 08/18/22  9:05 PM    Specimen: Nasopharynx; Swab   Result Value Ref Range    COVID19 Detected (C) Not Detected - Ref. Range         RADIOLOGY RESULTS  CT Head Without Contrast, CT Cervical Spine Without Contrast, CT Facial Bones Without Contrast    Result Date: 8/18/2022  EXAM:  CT HEAD WO CONTRAST-, CT FACIAL BONES WO CONTRAST-, CT CERVICAL SPINE WO CONTRAST-  HISTORY:  Fall, facial trauma, neck pain, alcohol intoxication.  COMPARISON:  CT head without contrast 01/08/2021.  TECHNIQUE: Noncontrast images of the brain, facial bones, and cervical spine were obtained. Reformatted images were reviewed. Radiation dose reduction techniques were utilized, including automated exposure control and exposure modulation based on body size.  FINDINGS:   BRAIN/FACIAL BONES:  There is mild global parenchymal volume loss.  No acute intracranial hemorrhage or pathologic extra-axial collection is identified.  There is no midline shift or mass effect.  The basal cisterns are patent.  The grey-white matter differentiation is maintained. There is calcific intracranial atherosclerosis.  There are acute mildly displaced bilateral nasal bone fractures with overlying soft tissue swelling. There is a questioned acute nondisplaced fracture of the anterior superior nasal septum. There is trace mucosal thickening of the ethmoid air cells and left maxillary sinus. There is moderate mucosal thickening of the right maxillary sinus with a corresponding fluid level and some high attenuation. There are large bilateral mastoid effusions, not significantly changed. Opacification on the left also extends into the middle ear. There is left greater than right  temporomandibular joint disease. There is multifocal dental disease, which is incompletely assessed.  CERVICAL SPINE:  There is straightening of the normal cervical lordosis. There is grade 1 anterolisthesis of C3 on C4 and C7 on T1. There is trace retrolisthesis of C4 on C5 and C5 on C6. Vertebral body heights are maintained. No acute cervical spine fracture is identified. There is at least moderate to advanced disc height loss at C4-5, C5-6, and C6-7. There are ventral degenerative endplate osteophytes, Schmorl's nodes, endplate sclerosis, uncovertebral hypertrophy, posterior disc osteophyte complexes, and facet osteoarthropathy at multiple levels. There is a least moderate spinal canal stenosis at C4-5 and C5-6 with mild spinal canal stenosis at multiple additional levels. There is severe neural foraminal stenosis on the right at C4-5 with lesser degrees of neural foraminal stenosis at additional levels.       1.  Acute mildly displaced bilateral nasal bone fractures with mild overlying soft tissue swelling. Questioned acute nondisplaced fracture of the anterior superior nasal septum. High attenuation within the right maxillary sinus likely reflects traumatic blood products. 2.  No acute intracranial hemorrhage. 3.  Large bilateral mastoid effusions are not significantly changed. Opacification extends into the middle ear on the left. ENT follow-up is recommended. Findings could also be further assessed with dedicated CT or MRI. 4.  No acute cervical spine fracture.  Discussed with JURGEN Farooq at 9:03 PM.  This report was finalized on 8/18/2022 9:04 PM by Dr. Isabela Alcala M.D.           PROGRESS, DATA ANALYSIS, CONSULTS AND MEDICAL DECISION MAKING  All labs have been independently reviewed by me.  All radiology studies have been reviewed by me and discussed with radiologist dictating the report.  EKG's independently viewed and interpreted by me unless stated otherwise. Discussion below represents my analysis  of pertinent findings related to patient's condition, differential diagnosis, treatment plan and final disposition.     ED Course as of 08/18/22 2318   Thu Aug 18, 2022   2120 I discussed CT head, cervical spine and facial bones with Dr. Alcala.  NO ICH, cervical spine without fracture.  She does have bilateral nasal bone fractures that are acute.  She has large mastoid effusions worse from prior imaging and ENT follow up is recommended.  [EW]   2205 COVID19(!!): Detected  Pt states she had covid a few month ago.   [EW]   2236 Pt was quite unsteady on her feet.  Will keep her here a while until she is more clinically sober.  [EW]   2311 Pt has been ambulating around room and is more steady.   wants to take her home.  He states he will put her to bed.  [EW]      ED Course User Index  [EW] [place], Isabelajovanna Beck, APRN     DDX: fall at home due to alcohol intoxication, nasal fracture, ICH, cervical spine injury, nasal laceration    MDM: Patient with no LOC today.  She is not anticoagulated.  There is no intracranial hemorrhage.  I did discuss with her directions for wound care and they have very strict importance of follow-up with her ENT provider, Dr. Coronado, in 5 days for suture removal and to follow-up the abnormality of the large mastoid effusions (which is not acute).  She was clinically sober at time of discharge.     Reviewed pt's history and workup with Dr. Barajas.  After a bedside evaluation, Dr. Barajas agrees with the plan of care.    The patient's history, physical exam, and lab findings were discussed with the physician, who also performed a face to face history and physical exam.  I discussed all results and noted any abnormalities with patient.  Discussed absoute need to recheck abnormalities with their family physician.  I answered any of the patient's questions.  Discussed plan for discharge, as there is no emergent indication for admission.  Pt is agreeable and understands need for follow up and  "repeat testing.  Pt is aware that discharge does not mean that nothing is wrong but it indicates no emergency is present and they must continue care with their family physician.  Pt is discharged with instructions to follow up with primary care doctor to have their blood pressure rechecked.       Disposition vitals:  /82   Pulse 99   Temp 97.9 °F (36.6 °C)   Resp 18   Ht 172.7 cm (68\")   SpO2 100%   BMI 24.33 kg/m²       DIAGNOSIS  Final diagnoses:   Alcoholic intoxication without complication (HCC)   COVID-19   Fall in home, initial encounter   Nasal laceration, initial encounter   Closed fracture of nasal bone, initial encounter   Abnormal CT scan of head, large mastoid effusions       FOLLOW UP   Ildefonso Coronado MD  [address] [address]  [phone]    Schedule an appointment as soon as possible for a visit   For suture removal         Isabela Hurd, APRN  08/18/22 6741    "

## 2022-08-19 LAB — QT INTERVAL: 371 MS

## 2022-08-19 NOTE — DISCHARGE INSTRUCTIONS
Laceration care:    1.  Keep initial dressing in place for 24 hours if able.  (if blood seeps through, then remove this dressing, wash gently with antibacterial soap and pat dry)    2.  After initial 24 hours wash the wound twice a day with antibacterial soap and apply thin film of over the counter triple antibiotic ointment (bacitracin or neosporin ointment)    3.  Sutures out in 5 days, call Dr. Coronado ENT tomorrow for appointment      Please follow-up with Dr. Coronado regarding the nasal bone fractures, nasal lacerations and the abnormal large mastoid effusions noted on CT scan.  You can take this paperwork with you to help remind you what to discuss with him.    Please expect soreness.  Take antibiotics as prescribed.    I will send a limited course of pain medication to your pharmacy.    Please consider that alcohol may be a problem for you    Return Precautions    Although you are being discharged from the ED today, I encourage you to return for worsening symptoms.  Things can, and do, change such that treatment at home with medication may not be adequate.      Specifically, return for any of the following:    Chest pain, shortness of breath, pain or nausea and vomiting not controlled by medications provided.    Please make a follow up with your Primary Care Provider for a blood pressure recheck.

## 2022-08-19 NOTE — ED PROVIDER NOTES
MD ATTESTATION NOTE    The MEG and I have discussed this patient's history, physical exam, and treatment plan.    I provided a substantive portion of the care of this patient. I personally performed the physical exam, in its entirety. The attached note describes my personal findings.      Victoria H Sturgeon is a 67 y.o. female who presents to the ED c/o trip and fall.  This was to her patio and tripped.  States she hit her head on concrete.  No LOC.  No tingling no numbness.  Reported pain to her neck or face.      On exam:  GENERAL: not distressed  HENT: nares patent, 1 cm linear subcu laceration to inner lip does not violate vermilion border  EYES: no scleral icterus  CV: regular rhythm, regular rate  RESPIRATORY: normal effort  ABDOMEN: soft  MUSCULOSKELETAL: no deformity  NEURO: alert, moves all extremities, follows commands  SKIN: warm, dry, 2 cm laceration to bridge of nose    Labs  Recent Results (from the past 24 hour(s))   ECG 12 Lead    Collection Time: 08/18/22  8:10 PM   Result Value Ref Range    QT Interval 371 ms   Comprehensive Metabolic Panel    Collection Time: 08/18/22  8:14 PM    Specimen: Blood   Result Value Ref Range    Glucose 88 65 - 99 mg/dL    BUN 10 8 - 23 mg/dL    Creatinine 0.65 0.57 - 1.00 mg/dL    Sodium 130 (L) 136 - 145 mmol/L    Potassium 4.1 3.5 - 5.2 mmol/L    Chloride 96 (L) 98 - 107 mmol/L    CO2 25.5 22.0 - 29.0 mmol/L    Calcium 8.1 (L) 8.6 - 10.5 mg/dL    Total Protein 5.7 (L) 6.0 - 8.5 g/dL    Albumin 3.80 3.50 - 5.20 g/dL    ALT (SGPT) 24 1 - 33 U/L    AST (SGOT) 27 1 - 32 U/L    Alkaline Phosphatase 126 (H) 39 - 117 U/L    Total Bilirubin 0.2 0.0 - 1.2 mg/dL    Globulin 1.9 gm/dL    A/G Ratio 2.0 g/dL    BUN/Creatinine Ratio 15.4 7.0 - 25.0    Anion Gap 8.5 5.0 - 15.0 mmol/L    eGFR 96.6 >60.0 mL/min/1.73   Protime-INR    Collection Time: 08/18/22  8:14 PM    Specimen: Blood   Result Value Ref Range    Protime 12.9 11.7 - 14.2 Seconds    INR 0.98 0.90 - 1.10   Ethanol     Collection Time: 08/18/22  8:14 PM    Specimen: Blood   Result Value Ref Range    Ethanol 325 (H) 0 - 10 mg/dL    Ethanol % 0.325 %   Magnesium    Collection Time: 08/18/22  8:14 PM    Specimen: Blood   Result Value Ref Range    Magnesium 1.6 1.6 - 2.4 mg/dL   CBC Auto Differential    Collection Time: 08/18/22  8:14 PM    Specimen: Blood   Result Value Ref Range    WBC 5.58 3.40 - 10.80 10*3/mm3    RBC 3.51 (L) 3.77 - 5.28 10*6/mm3    Hemoglobin 11.4 (L) 12.0 - 15.9 g/dL    Hematocrit 34.3 34.0 - 46.6 %    MCV 97.7 (H) 79.0 - 97.0 fL    MCH 32.5 26.6 - 33.0 pg    MCHC 33.2 31.5 - 35.7 g/dL    RDW 14.0 12.3 - 15.4 %    RDW-SD 49.0 37.0 - 54.0 fl    MPV 9.3 6.0 - 12.0 fL    Platelets 265 140 - 450 10*3/mm3    Neutrophil % 35.9 (L) 42.7 - 76.0 %    Lymphocyte % 45.0 19.6 - 45.3 %    Monocyte % 12.4 (H) 5.0 - 12.0 %    Eosinophil % 5.6 0.3 - 6.2 %    Basophil % 0.7 0.0 - 1.5 %    Immature Grans % 0.4 0.0 - 0.5 %    Neutrophils, Absolute 2.01 1.70 - 7.00 10*3/mm3    Lymphocytes, Absolute 2.51 0.70 - 3.10 10*3/mm3    Monocytes, Absolute 0.69 0.10 - 0.90 10*3/mm3    Eosinophils, Absolute 0.31 0.00 - 0.40 10*3/mm3    Basophils, Absolute 0.04 0.00 - 0.20 10*3/mm3    Immature Grans, Absolute 0.02 0.00 - 0.05 10*3/mm3    nRBC 0.0 0.0 - 0.2 /100 WBC   Urinalysis With Culture If Indicated - Urine, Clean Catch    Collection Time: 08/18/22  8:17 PM    Specimen: Urine, Clean Catch   Result Value Ref Range    Color, UA Yellow Yellow, Straw    Appearance, UA Clear Clear    pH, UA 7.0 5.0 - 8.0    Specific Gravity, UA <=1.005 1.005 - 1.030    Glucose, UA Negative Negative    Ketones, UA Negative Negative    Bilirubin, UA Negative Negative    Blood, UA Negative Negative    Protein, UA Negative Negative    Leuk Esterase, UA Trace (A) Negative    Nitrite, UA Negative Negative    Urobilinogen, UA 0.2 E.U./dL 0.2 - 1.0 E.U./dL   Urine Drug Screen - Urine, Clean Catch    Collection Time: 08/18/22  8:17 PM    Specimen: Urine, Clean Catch    Result Value Ref Range    Amphet/Methamphet, Screen Negative Negative    Barbiturates Screen, Urine Negative Negative    Benzodiazepine Screen, Urine Negative Negative    Cocaine Screen, Urine Negative Negative    Opiate Screen Positive (A) Negative    THC, Screen, Urine Negative Negative    Methadone Screen, Urine Negative Negative    Oxycodone Screen, Urine Negative Negative   Urinalysis, Microscopic Only - Urine, Clean Catch    Collection Time: 08/18/22  8:17 PM    Specimen: Urine, Clean Catch   Result Value Ref Range    RBC, UA 0-2 None Seen, 0-2 /HPF    WBC, UA 3-5 (A) None Seen, 0-2 /HPF    Bacteria, UA 2+ (A) None Seen /HPF    Squamous Epithelial Cells, UA 0-2 None Seen, 0-2 /HPF    Hyaline Casts, UA None Seen None Seen /LPF    Methodology Automated Microscopy    COVID-19, [facility] IN-HOUSE CEPHEID/UDAY NP SWAB IN TRANSPORT MEDIA 8-12 HR TAT - Swab, Nasopharynx    Collection Time: 08/18/22  9:05 PM    Specimen: Nasopharynx; Swab   Result Value Ref Range    COVID19 Detected (C) Not Detected - Ref. Range       Radiology  CT Head Without Contrast, CT Cervical Spine Without Contrast, CT Facial Bones Without Contrast    Result Date: 8/18/2022  EXAM:  CT HEAD WO CONTRAST-, CT FACIAL BONES WO CONTRAST-, CT CERVICAL SPINE WO CONTRAST-  HISTORY:  Fall, facial trauma, neck pain, alcohol intoxication.  COMPARISON:  CT head without contrast 01/08/2021.  TECHNIQUE: Noncontrast images of the brain, facial bones, and cervical spine were obtained. Reformatted images were reviewed. Radiation dose reduction techniques were utilized, including automated exposure control and exposure modulation based on body size.  FINDINGS:   BRAIN/FACIAL BONES:  There is mild global parenchymal volume loss.  No acute intracranial hemorrhage or pathologic extra-axial collection is identified.  There is no midline shift or mass effect.  The basal cisterns are patent.  The grey-white matter differentiation is maintained. There is calcific  intracranial atherosclerosis.  There are acute mildly displaced bilateral nasal bone fractures with overlying soft tissue swelling. There is a questioned acute nondisplaced fracture of the anterior superior nasal septum. There is trace mucosal thickening of the ethmoid air cells and left maxillary sinus. There is moderate mucosal thickening of the right maxillary sinus with a corresponding fluid level and some high attenuation. There are large bilateral mastoid effusions, not significantly changed. Opacification on the left also extends into the middle ear. There is left greater than right temporomandibular joint disease. There is multifocal dental disease, which is incompletely assessed.  CERVICAL SPINE:  There is straightening of the normal cervical lordosis. There is grade 1 anterolisthesis of C3 on C4 and C7 on T1. There is trace retrolisthesis of C4 on C5 and C5 on C6. Vertebral body heights are maintained. No acute cervical spine fracture is identified. There is at least moderate to advanced disc height loss at C4-5, C5-6, and C6-7. There are ventral degenerative endplate osteophytes, Schmorl's nodes, endplate sclerosis, uncovertebral hypertrophy, posterior disc osteophyte complexes, and facet osteoarthropathy at multiple levels. There is a least moderate spinal canal stenosis at C4-5 and C5-6 with mild spinal canal stenosis at multiple additional levels. There is severe neural foraminal stenosis on the right at C4-5 with lesser degrees of neural foraminal stenosis at additional levels.       1.  Acute mildly displaced bilateral nasal bone fractures with mild overlying soft tissue swelling. Questioned acute nondisplaced fracture of the anterior superior nasal septum. High attenuation within the right maxillary sinus likely reflects traumatic blood products. 2.  No acute intracranial hemorrhage. 3.  Large bilateral mastoid effusions are not significantly changed. Opacification extends into the middle ear on the  left. ENT follow-up is recommended. Findings could also be further assessed with dedicated CT or MRI. 4.  No acute cervical spine fracture.  Discussed with JURGEN Farooq at 9:03 PM.  This report was finalized on 8/18/2022 9:04 PM by Dr. Isabela Alcala M.D.        Medical Decision Making:  ED Course as of 08/19/22 0224   Thu Aug 18, 2022   2120 I discussed CT head, cervical spine and facial bones with Dr. Alcala.  NO ICH, cervical spine without fracture.  She does have bilateral nasal bone fractures that are acute.  She has large mastoid effusions worse from prior imaging and ENT follow up is recommended.  [EW]   2205 COVID19(!!): Detected  Pt states she had covid a few month ago.   [EW]   2236 Pt was quite unsteady on her feet.  Will keep her here a while until she is more clinically sober.  [EW]   2311 Pt has been ambulating around room and is more steady.   wants to take her home.  He states he will put her to bed.  [EW]   2336 EKG    viewed by ER MD prior to my interpretation      EKG time: 2010  Rhythm/Rate: Sinus rhythm heart rate 93  P waves and RI: Normal RI, normal KRISTY  QRS, axis: Normal QRS, normal axis  ST and T waves: No acute ST-T wave abnormalities    Interpreted Contemporaneously by me, independently viewed  No prior available for comparison   [EW]      ED Course User Index  [EW] Isabela Hurd APRN           PPE: Both the patient and I wore a surgical mask throughout the entire patient encounter. I wore protective goggles.     Diagnosis  Final diagnoses:   Alcoholic intoxication without complication (HCC)   COVID-19   Fall in home, initial encounter   Nasal laceration, initial encounter   Closed fracture of nasal bone, initial encounter   Abnormal CT scan of head, large mastoid effusions        Ben Barajas MD  08/19/22 0224

## 2022-09-13 ENCOUNTER — HOSPITAL ENCOUNTER (EMERGENCY)
Facility: HOSPITAL | Age: 67
Discharge: HOME | End: 2022-09-14
Attending: EMERGENCY MEDICINE | Admitting: EMERGENCY MEDICINE

## 2022-09-13 ENCOUNTER — APPOINTMENT (OUTPATIENT)
Dept: CT IMAGING | Facility: HOSPITAL | Age: 67
End: 2022-09-13

## 2022-09-13 DIAGNOSIS — F10.920 ACUTE ALCOHOLIC INTOXICATION WITHOUT COMPLICATION: ICD-10-CM

## 2022-09-13 DIAGNOSIS — I42.8 NICM (NONISCHEMIC CARDIOMYOPATHY): ICD-10-CM

## 2022-09-13 DIAGNOSIS — E87.1 HYPONATREMIA: ICD-10-CM
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
## 2022-09-13 DIAGNOSIS — E83.42 HYPOMAGNESEMIA: ICD-10-CM

## 2022-09-13 DIAGNOSIS — I10 HYPERTENSION, UNSPECIFIED TYPE: ICD-10-CM

## 2022-09-13 DIAGNOSIS — E87.6 HYPOKALEMIA: ICD-10-CM

## 2022-09-13 DIAGNOSIS — S00.83XA CONTUSION OF FACE, INITIAL ENCOUNTER: ICD-10-CM

## 2022-09-13 LAB
ALBUMIN SERPL-MCNC: 4.1 G/DL (ref 3.5–5.2)
ALBUMIN/GLOB SERPL: 1.9 G/DL
ALP SERPL-CCNC: 95 U/L (ref 39–117)
ALT SERPL W P-5'-P-CCNC: 26 U/L (ref 1–33)
ANION GAP SERPL CALCULATED.3IONS-SCNC: 13.6 MMOL/L (ref 5–15)
AST SERPL-CCNC: 48 U/L (ref 1–32)
BASOPHILS # BLD AUTO: 0.03 10*3/MM3 (ref 0–0.2)
BASOPHILS NFR BLD AUTO: 0.7 % (ref 0–1.5)
BILIRUB SERPL-MCNC: 0.8 MG/DL (ref 0–1.2)
BUN SERPL-MCNC: 5 MG/DL (ref 8–23)
BUN/CREAT SERPL: 10.9 (ref 7–25)
CALCIUM SPEC-SCNC: 7.9 MG/DL (ref 8.6–10.5)
CHLORIDE SERPL-SCNC: 90 MMOL/L (ref 98–107)
CO2 SERPL-SCNC: 21.4 MMOL/L (ref 22–29)
CREAT SERPL-MCNC: 0.46 MG/DL (ref 0.57–1)
DEPRECATED RDW RBC AUTO: 49.6 FL (ref 37–54)
EGFRCR SERPLBLD CKD-EPI 2021: 105 ML/MIN/1.73
EOSINOPHIL # BLD AUTO: 0.07 10*3/MM3 (ref 0–0.4)
EOSINOPHIL NFR BLD AUTO: 1.6 % (ref 0.3–6.2)
ERYTHROCYTE [DISTWIDTH] IN BLOOD BY AUTOMATED COUNT: 14.4 % (ref 12.3–15.4)
ETHANOL BLD-MCNC: 203 MG/DL (ref 0–10)
ETHANOL UR QL: 0.2 %
GLOBULIN UR ELPH-MCNC: 2.2 GM/DL
GLUCOSE SERPL-MCNC: 79 MG/DL (ref 65–99)
HCT VFR BLD AUTO: 33.8 % (ref 34–46.6)
HGB BLD-MCNC: 11.8 G/DL (ref 12–15.9)
IMM GRANULOCYTES # BLD AUTO: 0.01 10*3/MM3 (ref 0–0.05)
IMM GRANULOCYTES NFR BLD AUTO: 0.2 % (ref 0–0.5)
LYMPHOCYTES # BLD AUTO: 2.03 10*3/MM3 (ref 0.7–3.1)
LYMPHOCYTES NFR BLD AUTO: 45.9 % (ref 19.6–45.3)
MAGNESIUM SERPL-MCNC: 1.2 MG/DL (ref 1.6–2.4)
MCH RBC QN AUTO: 33.5 PG (ref 26.6–33)
MCHC RBC AUTO-ENTMCNC: 34.9 G/DL (ref 31.5–35.7)
MCV RBC AUTO: 96 FL (ref 79–97)
MONOCYTES # BLD AUTO: 0.53 10*3/MM3 (ref 0.1–0.9)
MONOCYTES NFR BLD AUTO: 12 % (ref 5–12)
NEUTROPHILS NFR BLD AUTO: 1.75 10*3/MM3 (ref 1.7–7)
NEUTROPHILS NFR BLD AUTO: 39.6 % (ref 42.7–76)
NRBC BLD AUTO-RTO: 0.5 /100 WBC (ref 0–0.2)
PLATELET # BLD AUTO: 146 10*3/MM3 (ref 140–450)
PMV BLD AUTO: 9.3 FL (ref 6–12)
POTASSIUM SERPL-SCNC: 3.2 MMOL/L (ref 3.5–5.2)
PROT SERPL-MCNC: 6.3 G/DL (ref 6–8.5)
RBC # BLD AUTO: 3.52 10*6/MM3 (ref 3.77–5.28)
SODIUM SERPL-SCNC: 125 MMOL/L (ref 136–145)
WBC NRBC COR # BLD: 4.42 10*3/MM3 (ref 3.4–10.8)

## 2022-09-13 PROCEDURE — 85025 COMPLETE CBC W/AUTO DIFF WBC: CPT | Performed by: PHYSICIAN ASSISTANT

## 2022-09-13 PROCEDURE — 70450 CT HEAD/BRAIN W/O DYE: CPT

## 2022-09-13 PROCEDURE — 96366 THER/PROPH/DIAG IV INF ADDON: CPT

## 2022-09-13 PROCEDURE — 70486 CT MAXILLOFACIAL W/O DYE: CPT

## 2022-09-13 PROCEDURE — 96365 THER/PROPH/DIAG IV INF INIT: CPT

## 2022-09-13 PROCEDURE — 82077 ASSAY SPEC XCP UR&BREATH IA: CPT | Performed by: PHYSICIAN ASSISTANT

## 2022-09-13 PROCEDURE — 72125 CT NECK SPINE W/O DYE: CPT

## 2022-09-13 PROCEDURE — 80053 COMPREHEN METABOLIC PANEL: CPT | Performed by: PHYSICIAN ASSISTANT

## 2022-09-13 PROCEDURE — 99284 EMERGENCY DEPT VISIT MOD MDM: CPT

## 2022-09-13 PROCEDURE — 83735 ASSAY OF MAGNESIUM: CPT | Performed by: PHYSICIAN ASSISTANT

## 2022-09-13 PROCEDURE — 36415 COLL VENOUS BLD VENIPUNCTURE: CPT

## 2022-09-13 RX ORDER — MAGNESIUM SULFATE HEPTAHYDRATE 40 MG/ML
2 INJECTION, SOLUTION INTRAVENOUS ONCE
Status: COMPLETED | OUTPATIENT
Start: 2022-09-13 | End: 2022-09-14

## 2022-09-13 RX ORDER — ONDANSETRON 2 MG/ML
4 INJECTION INTRAMUSCULAR; INTRAVENOUS ONCE
Status: COMPLETED | OUTPATIENT
Start: 2022-09-13 | End: 2022-09-14

## 2022-09-13 RX ORDER — POTASSIUM CHLORIDE 750 MG/1
40 TABLET, FILM COATED, EXTENDED RELEASE ORAL ONCE
Status: COMPLETED | OUTPATIENT
Start: 2022-09-13 | End: 2022-09-14

## 2022-09-13 RX ORDER — SODIUM CHLORIDE 0.9 % (FLUSH) 0.9 %
10 SYRINGE (ML) INJECTION AS NEEDED
Status: DISCONTINUED | OUTPATIENT
Start: 2022-09-13 | End: 2022-09-14 | Stop reason: HOSPADM

## 2022-09-14 VITALS
DIASTOLIC BLOOD PRESSURE: 88 MMHG | HEART RATE: 82 BPM | SYSTOLIC BLOOD PRESSURE: 148 MMHG | OXYGEN SATURATION: 93 % | RESPIRATION RATE: 16 BRPM | TEMPERATURE: 98.8 F

## 2022-09-14 PROCEDURE — 96375 TX/PRO/DX INJ NEW DRUG ADDON: CPT

## 2022-09-14 PROCEDURE — 25010000002 ONDANSETRON PER 1 MG: Performed by: PHYSICIAN ASSISTANT

## 2022-09-14 PROCEDURE — 96365 THER/PROPH/DIAG IV INF INIT: CPT

## 2022-09-14 PROCEDURE — 25010000002 THIAMINE PER 100 MG: Performed by: PHYSICIAN ASSISTANT

## 2022-09-14 PROCEDURE — 96366 THER/PROPH/DIAG IV INF ADDON: CPT

## 2022-09-14 PROCEDURE — 25010000002 MAGNESIUM SULFATE 2 GM/50ML SOLUTION: Performed by: PHYSICIAN ASSISTANT

## 2022-09-14 RX ORDER — ACETAMINOPHEN 325 MG/1
650 TABLET ORAL ONCE
Status: COMPLETED | OUTPATIENT
Start: 2022-09-14 | End: 2022-09-14

## 2022-09-14 RX ADMIN — MAGNESIUM SULFATE HEPTAHYDRATE 2 G: 2 INJECTION, SOLUTION INTRAVENOUS at 01:03

## 2022-09-14 RX ADMIN — Medication 10 ML: at 03:14

## 2022-09-14 RX ADMIN — ONDANSETRON 4 MG: 2 INJECTION INTRAMUSCULAR; INTRAVENOUS at 00:51

## 2022-09-14 RX ADMIN — FOLIC ACID 1000 ML/HR: 5 INJECTION, SOLUTION INTRAMUSCULAR; INTRAVENOUS; SUBCUTANEOUS at 00:55

## 2022-09-14 RX ADMIN — POTASSIUM CHLORIDE 40 MEQ: 750 TABLET, EXTENDED RELEASE ORAL at 00:42

## 2022-09-14 RX ADMIN — ACETAMINOPHEN 650 MG: 325 TABLET, FILM COATED ORAL at 03:14

## 2022-09-14 NOTE — ED PROVIDER NOTES
MD ATTESTATION NOTE    The MEG and I have discussed this patient's history, physical exam, and treatment plan.    I provided a substantive portion of the care of this patient. I personally performed the physical exam, in its entirety. The attached note describes my personal findings.      Victoria H Sturgeon is a 67 y.o. female who presents to the ED c/o fall.  States she is getting up out of bed to go to the bathroom and tripped and struck the right side of her face on her bedside table.  Patient prior history of alcohol abuse.  States she did have some drinks this evening.  Complaining of pain to right face and headache      On exam:  GENERAL: not distressed  HENT: nares patent, large periorbital ecchymosis  EYES: no scleral icterus, EOMI PERRLA no visual deficits  CV: regular rhythm, regular rate  RESPIRATORY: normal effort  ABDOMEN: soft  MUSCULOSKELETAL: no deformity  NEURO: alert, moves all extremities, follows commands  SKIN: warm, dry    Labs  Recent Results (from the past 24 hour(s))   Comprehensive Metabolic Panel    Collection Time: 09/13/22 10:53 PM    Specimen: Blood   Result Value Ref Range    Glucose 79 65 - 99 mg/dL    BUN 5 (L) 8 - 23 mg/dL    Creatinine 0.46 (L) 0.57 - 1.00 mg/dL    Sodium 125 (L) 136 - 145 mmol/L    Potassium 3.2 (L) 3.5 - 5.2 mmol/L    Chloride 90 (L) 98 - 107 mmol/L    CO2 21.4 (L) 22.0 - 29.0 mmol/L    Calcium 7.9 (L) 8.6 - 10.5 mg/dL    Total Protein 6.3 6.0 - 8.5 g/dL    Albumin 4.10 3.50 - 5.20 g/dL    ALT (SGPT) 26 1 - 33 U/L    AST (SGOT) 48 (H) 1 - 32 U/L    Alkaline Phosphatase 95 39 - 117 U/L    Total Bilirubin 0.8 0.0 - 1.2 mg/dL    Globulin 2.2 gm/dL    A/G Ratio 1.9 g/dL    BUN/Creatinine Ratio 10.9 7.0 - 25.0    Anion Gap 13.6 5.0 - 15.0 mmol/L    eGFR 105.0 >60.0 mL/min/1.73   Ethanol    Collection Time: 09/13/22 10:53 PM    Specimen: Blood   Result Value Ref Range    Ethanol 203 (H) 0 - 10 mg/dL    Ethanol % 0.203 %   Magnesium    Collection Time: 09/13/22 10:53  PM    Specimen: Blood   Result Value Ref Range    Magnesium 1.2 (L) 1.6 - 2.4 mg/dL   CBC Auto Differential    Collection Time: 09/13/22 10:53 PM    Specimen: Blood   Result Value Ref Range    WBC 4.42 3.40 - 10.80 10*3/mm3    RBC 3.52 (L) 3.77 - 5.28 10*6/mm3    Hemoglobin 11.8 (L) 12.0 - 15.9 g/dL    Hematocrit 33.8 (L) 34.0 - 46.6 %    MCV 96.0 79.0 - 97.0 fL    MCH 33.5 (H) 26.6 - 33.0 pg    MCHC 34.9 31.5 - 35.7 g/dL    RDW 14.4 12.3 - 15.4 %    RDW-SD 49.6 37.0 - 54.0 fl    MPV 9.3 6.0 - 12.0 fL    Platelets 146 140 - 450 10*3/mm3    Neutrophil % 39.6 (L) 42.7 - 76.0 %    Lymphocyte % 45.9 (H) 19.6 - 45.3 %    Monocyte % 12.0 5.0 - 12.0 %    Eosinophil % 1.6 0.3 - 6.2 %    Basophil % 0.7 0.0 - 1.5 %    Immature Grans % 0.2 0.0 - 0.5 %    Neutrophils, Absolute 1.75 1.70 - 7.00 10*3/mm3    Lymphocytes, Absolute 2.03 0.70 - 3.10 10*3/mm3    Monocytes, Absolute 0.53 0.10 - 0.90 10*3/mm3    Eosinophils, Absolute 0.07 0.00 - 0.40 10*3/mm3    Basophils, Absolute 0.03 0.00 - 0.20 10*3/mm3    Immature Grans, Absolute 0.01 0.00 - 0.05 10*3/mm3    nRBC 0.5 (H) 0.0 - 0.2 /100 WBC       Radiology  CT Head Without Contrast    Result Date: 9/14/2022  CT HEAD WITHOUT CONTRAST; CT FACIAL BONES  HISTORY: Fall with facial injury  COMPARISON: 08/18/2022  TECHNIQUE: Axial CT imaging was obtained through the brain. IV contrast was administered. Axial CT imaging was obtained through the facial bones. Coronal and sagittal reformatted images were obtained.  FINDINGS: CT HEAD WITHOUT CONTRAST: No acute intracranial hemorrhage is seen. The patient has advanced atrophy. There is periventricular and deep white matter microangiopathic change. There is no midline shift or mass effect. No calvarial fracture is seen. There is extensive opacification of the mastoid air cells bilaterally. This was seen on prior study. As was previously discussed, patient does have extension into the left middle ear. Mucosal thickening is also noted within the  maxillary and ethmoid sinuses bilaterally. Patient is status post sinus surgery.  CT FACIAL BONES: No facial bone fractures are seen. There is a large amount of periorbital soft tissue swelling, which extends inferiorly over the right cheek. The right globe appears intact, and there is no evidence of post septal extension. There are advanced degenerative changes involving the left TMJ.       1. No acute intracranial findings. 2. No facial bone fracture seen.  Radiation dose reduction techniques were utilized, including automated exposure control and exposure modulation based on body size.  This report was finalized on 9/14/2022 12:14 AM by Dr. Zeinab Oviedo M.D.      CT Cervical Spine Without Contrast    Result Date: 9/14/2022  CT OF THE CERVICAL SPINE  HISTORY: Trauma  COMPARISON: 08/18/2022  TECHNIQUE: Axial CT imaging was obtained through the cervical spine. Coronal and sagittal reformatted images were obtained.  FINDINGS: No acute fracture or subluxation of the cervical spine is seen. There is anterolisthesis of C3 on C4. This is stable when compared to prior exam. There is severe intervertebral disc space narrowing noted at multiple levels, again, not significantly changed. Patient also has some stable anterolisthesis of C7 on T1. There is no prevertebral soft tissue swelling.  C2-C3: There is no canal stenosis or neural foraminal narrowing. C3-C4: There is mild canal stenosis. There is mild neural foraminal narrowing on the right. C4-C5: There is moderate canal stenosis. There is moderate bilateral neural foraminal narrowing, right greater than left. C5-C6: There is moderate canal stenosis. There is moderate bilateral neural foraminal narrowing. C6-C7: There is moderate canal stenosis. There is moderate bilateral neural foraminal narrowing, right greater than left. C7-T1: There is mild canal narrowing. There is no significant neural foraminal narrowing.      No acute fracture or subluxation identified.   Radiation dose reduction techniques were utilized, including automated exposure control and exposure modulation based on body size.  This report was finalized on 9/14/2022 12:18 AM by Dr. Zeinab Oviedo M.D.        Medical Decision Making:  ED Course as of 09/14/22 0454   Tue Sep 13, 2022   2325 Ethanol(!): 203 [SHARI]   2325 WBC: 4.42 [SHARI]   2325 Hemoglobin(!): 11.8 [SHARI]   2325 Hematocrit(!): 33.8 [SHARI]   2325 Platelets: 146 [SHARI]   2325 Glucose: 79 [SHARI]   2325 BUN(!): 5 [SHARI]   2325 Creatinine(!): 0.46 [SHARI]   2325 Sodium(!): 125 [SHARI]   2325 Potassium(!): 3.2 [SHARI]   2325 Magnesium(!): 1.2 [SHARI]   2325 Chloride(!): 90 [SHARI]   2325 CO2(!): 21.4 [SHARI]   2351 Patient's potassium is 3.2 and magnesium is 1.2, will replace. [SHARI]   Wed Sep 14, 2022   0244 Patient rechecked, resting comfortably in bed, no acute distress.  Patient's  is at the bedside at this time and feels comfortable taking the patient home. [SHARI]      ED Course User Index  [SHARI] Ricky Palomo PA           PPE: Both the patient and I wore a surgical mask throughout the entire patient encounter. I wore protective goggles.     Diagnosis  Final diagnoses:   Fall, initial encounter   Contusion of face, initial encounter   Acute alcoholic intoxication without complication (HCC)   Hyponatremia   Hypokalemia   Hypomagnesemia   Hypertension, unspecified type   NICM (nonischemic cardiomyopathy) (HCC)        Ben Barajas MD  09/14/22 0454

## 2022-09-14 NOTE — ED PROVIDER NOTES
EMERGENCY DEPARTMENT ENCOUNTER    Room Number:  05/05  Date of encounter:  9/14/2022  PCP: Deana Wilcox MD  Historian: Patient      HPI:  Chief Complaint: Fall   A complete HPI/ROS/PMH/PSH/SH/FH are unobtainable due to: N/A    Context: Victoria H Sturgeon is a 67 y.o. female with past medical history of alcohol abuse, HTN, HLD and NICM who presents to the ED c/o fall with facial injury.  Patient states that she got up to go to the bathroom when she tripped, fell, and struck the right side of her face on the bedside table.  Did have 4-5 white claw alcoholic beverages earlier today.  Patient denies any loss of consciousness, but has pain to the right face, neck pain, and a headache.  Patient has a large amount of swelling and bruising to the right cheek with a small laceration.  Patient's last tetanus vaccination was 7/4/2021.      PAST MEDICAL HISTORY  Active Ambulatory Problems     Diagnosis Date Noted   • Acute renal failure (HCC) 05/03/2016   • Diverticulitis of intestine 05/02/2016   • Hypertension 04/06/2017   • Hyponatremia 05/03/2016   • Macrocytic anemia 05/03/2016   • Metabolic acidosis 05/03/2016   • Vulvitis 04/06/2017   • Dyslipidemia 12/30/2019   • NICM (nonischemic cardiomyopathy) ([facility]) 12/30/2019   • Dense breast tissue on mammogram 01/07/2020   • Lichen sclerosus of female genitalia 07/15/2020   • Alcohol abuse 01/08/2021   • Metabolic encephalopathy 01/08/2021   • Dehydration 01/08/2021   • COVID-19 06/08/2022   • Pneumonia due to COVID-19 virus 06/08/2022   • Lobar pneumonia (HCC) 06/12/2022     Resolved Ambulatory Problems     Diagnosis Date Noted   • No Resolved Ambulatory Problems     Past Medical History:   Diagnosis Date   • Abnormal LFTs (liver function tests)    • Abnormality on patient-activated cardiac event recorder    • Acute pain of left foot    • Aphthous ulcer of tongue    • Cardiomyopathy (HCC) 2008   • Cervical radiculitis 11/16/2016   • Chest discomfort    •  Chondromalacia patellae, right knee 2017   • Colon cancer screening    • Contusion of right knee 2017   • Degeneration of cervical intervertebral disc    • Degenerative joint disease 2016   • Depression 2015   • Encounter for long-term (current) use of high-risk medication    • Encounter for screening colonoscopy    • Folate deficiency    • H/O cancer antigen 125 (CA-125) measurement    • H/O Doppler ultrasound    • History of alcohol abuse 2018   • History of bone density study 2018   • History of cardiomyopathy    • History of Papanicolaou smear of cervix    • HPV test positive    • Hyperlipidemia    • Hypothyroidism    • Internal hemorrhoid    • Menopause    • Neck pain    • Normal coronary arteries    • Osteopenia 2018   • Pharyngeal abscess    • Sinusitis    • Spinal stenosis 2016   • Sprain of sacroiliac joint 2017   • Staphylococcal infection    • Trigeminal neuralgia    • Urge incontinence    • Vitamin B 12 deficiency          PAST SURGICAL HISTORY  Past Surgical History:   Procedure Laterality Date   • CATARACT EXTRACTION Bilateral 2016   •  SECTION      Baby Girl BERNA ([facility] )   • CHOLECYSTECTOMY  1963   • COLONOSCOPY  2014    Diverticulosis sigmoid, internal hemorrhoids   • HERNIA REPAIR      Age 19   • KNEE ARTHROSCOPY INCISION AND DRAINAGE OF KNEE Right  & 2019    after fall   • SINUS SURGERY      X 2 in  and in          FAMILY HISTORY  Family History   Problem Relation Age of Onset   • Lupus Mother         Systemic Lupus Erythematosus (  in her sleep @ 73)   • Leukemia Father         CLL Had it for 14 years.   • Colon polyps Other         Family History   • Colon cancer Other         Family History   • Other Sister         breast lumpectomy   • No Known Problems Brother    • Scoliosis Daughter    • Heart disease Maternal Grandmother    • Anuerysm Maternal Grandmother 80   • No Known Problems  Paternal Grandmother         90's   • Breast cancer Paternal Aunt 78   • No Known Problems Maternal Grandfather    • No Known Problems Paternal Grandfather         90's   • No Known Problems Sister    • Diabetes type II Brother         Diet controlled    • BRCA 1/2 Neg Hx    • Endometrial cancer Neg Hx    • Ovarian cancer Neg Hx          SOCIAL HISTORY  Social History     Socioeconomic History   • Marital status:      Spouse name: CRISTINO   • Number of children: 1   Tobacco Use   • Smoking status: Never Smoker   • Smokeless tobacco: Never Used   Substance and Sexual Activity   • Alcohol use: Yes     Comment: no more than 2-3 drinks in a setting. hx of excessive alc use   • Drug use: No   • Sexual activity: Yes     Partners: Male     Comment: spouse = CRISTINO, with ED.         ALLERGIES  Cefdinir, Latex, Amoxicillin-pot clavulanate, and Eggs or egg-derived products        REVIEW OF SYSTEMS  Review of Systems     All systems reviewed and negative except for those discussed in HPI.       PHYSICAL EXAM    I have reviewed the triage vital signs and nursing notes.    ED Triage Vitals [09/13/22 2230]   Temp Heart Rate Resp BP SpO2   99.2 °F (37.3 °C) 99 18 128/80 100 %      Temp src Heart Rate Source Patient Position BP Location FiO2 (%)   Tympanic -- -- -- --       Physical Exam  GENERAL: Alert and orient x3, smell of alcohol on breath, speech slurred, not distressed  HENT: nares patent, no rhinorrhea, moist mucous membranes, no hemotympanum, there is a large right periorbital and cheek hematoma  EYES: no scleral icterus, PERRL, EOMI  CV: regular rhythm, regular rate  RESPIRATORY: normal effort, clear to auscultate bilaterally  ABDOMEN: soft/nontender  MUSCULOSKELETAL: no deformity, normal ROM, pelvis stable, no bony tenderness  NEURO: alert, moves all extremities, no focal neuro deficits, follows commands  SKIN: warm, dry, ecchymosis to the right side of the face/cheek with superficial 0.5 cm laceration      LAB  RESULTS  Recent Results (from the past 24 hour(s))   Comprehensive Metabolic Panel    Collection Time: 09/13/22 10:53 PM    Specimen: Blood   Result Value Ref Range    Glucose 79 65 - 99 mg/dL    BUN 5 (L) 8 - 23 mg/dL    Creatinine 0.46 (L) 0.57 - 1.00 mg/dL    Sodium 125 (L) 136 - 145 mmol/L    Potassium 3.2 (L) 3.5 - 5.2 mmol/L    Chloride 90 (L) 98 - 107 mmol/L    CO2 21.4 (L) 22.0 - 29.0 mmol/L    Calcium 7.9 (L) 8.6 - 10.5 mg/dL    Total Protein 6.3 6.0 - 8.5 g/dL    Albumin 4.10 3.50 - 5.20 g/dL    ALT (SGPT) 26 1 - 33 U/L    AST (SGOT) 48 (H) 1 - 32 U/L    Alkaline Phosphatase 95 39 - 117 U/L    Total Bilirubin 0.8 0.0 - 1.2 mg/dL    Globulin 2.2 gm/dL    A/G Ratio 1.9 g/dL    BUN/Creatinine Ratio 10.9 7.0 - 25.0    Anion Gap 13.6 5.0 - 15.0 mmol/L    eGFR 105.0 >60.0 mL/min/1.73   Ethanol    Collection Time: 09/13/22 10:53 PM    Specimen: Blood   Result Value Ref Range    Ethanol 203 (H) 0 - 10 mg/dL    Ethanol % 0.203 %   Magnesium    Collection Time: 09/13/22 10:53 PM    Specimen: Blood   Result Value Ref Range    Magnesium 1.2 (L) 1.6 - 2.4 mg/dL   CBC Auto Differential    Collection Time: 09/13/22 10:53 PM    Specimen: Blood   Result Value Ref Range    WBC 4.42 3.40 - 10.80 10*3/mm3    RBC 3.52 (L) 3.77 - 5.28 10*6/mm3    Hemoglobin 11.8 (L) 12.0 - 15.9 g/dL    Hematocrit 33.8 (L) 34.0 - 46.6 %    MCV 96.0 79.0 - 97.0 fL    MCH 33.5 (H) 26.6 - 33.0 pg    MCHC 34.9 31.5 - 35.7 g/dL    RDW 14.4 12.3 - 15.4 %    RDW-SD 49.6 37.0 - 54.0 fl    MPV 9.3 6.0 - 12.0 fL    Platelets 146 140 - 450 10*3/mm3    Neutrophil % 39.6 (L) 42.7 - 76.0 %    Lymphocyte % 45.9 (H) 19.6 - 45.3 %    Monocyte % 12.0 5.0 - 12.0 %    Eosinophil % 1.6 0.3 - 6.2 %    Basophil % 0.7 0.0 - 1.5 %    Immature Grans % 0.2 0.0 - 0.5 %    Neutrophils, Absolute 1.75 1.70 - 7.00 10*3/mm3    Lymphocytes, Absolute 2.03 0.70 - 3.10 10*3/mm3    Monocytes, Absolute 0.53 0.10 - 0.90 10*3/mm3    Eosinophils, Absolute 0.07 0.00 - 0.40 10*3/mm3     Basophils, Absolute 0.03 0.00 - 0.20 10*3/mm3    Immature Grans, Absolute 0.01 0.00 - 0.05 10*3/mm3    nRBC 0.5 (H) 0.0 - 0.2 /100 WBC       Ordered the above labs and independently reviewed the results.        RADIOLOGY  CT Head Without Contrast    Result Date: 9/14/2022  CT HEAD WITHOUT CONTRAST; CT FACIAL BONES  HISTORY: Fall with facial injury  COMPARISON: 08/18/2022  TECHNIQUE: Axial CT imaging was obtained through the brain. IV contrast was administered. Axial CT imaging was obtained through the facial bones. Coronal and sagittal reformatted images were obtained.  FINDINGS: CT HEAD WITHOUT CONTRAST: No acute intracranial hemorrhage is seen. The patient has advanced atrophy. There is periventricular and deep white matter microangiopathic change. There is no midline shift or mass effect. No calvarial fracture is seen. There is extensive opacification of the mastoid air cells bilaterally. This was seen on prior study. As was previously discussed, patient does have extension into the left middle ear. Mucosal thickening is also noted within the maxillary and ethmoid sinuses bilaterally. Patient is status post sinus surgery.  CT FACIAL BONES: No facial bone fractures are seen. There is a large amount of periorbital soft tissue swelling, which extends inferiorly over the right cheek. The right globe appears intact, and there is no evidence of post septal extension. There are advanced degenerative changes involving the left TMJ.       1. No acute intracranial findings. 2. No facial bone fracture seen.  Radiation dose reduction techniques were utilized, including automated exposure control and exposure modulation based on body size.  This report was finalized on 9/14/2022 12:14 AM by Dr. Zeinab Oviedo M.D.      CT Cervical Spine Without Contrast    Result Date: 9/14/2022  CT OF THE CERVICAL SPINE  HISTORY: Trauma  COMPARISON: 08/18/2022  TECHNIQUE: Axial CT imaging was obtained through the cervical spine. Coronal  and sagittal reformatted images were obtained.  FINDINGS: No acute fracture or subluxation of the cervical spine is seen. There is anterolisthesis of C3 on C4. This is stable when compared to prior exam. There is severe intervertebral disc space narrowing noted at multiple levels, again, not significantly changed. Patient also has some stable anterolisthesis of C7 on T1. There is no prevertebral soft tissue swelling.  C2-C3: There is no canal stenosis or neural foraminal narrowing. C3-C4: There is mild canal stenosis. There is mild neural foraminal narrowing on the right. C4-C5: There is moderate canal stenosis. There is moderate bilateral neural foraminal narrowing, right greater than left. C5-C6: There is moderate canal stenosis. There is moderate bilateral neural foraminal narrowing. C6-C7: There is moderate canal stenosis. There is moderate bilateral neural foraminal narrowing, right greater than left. C7-T1: There is mild canal narrowing. There is no significant neural foraminal narrowing.      No acute fracture or subluxation identified.  Radiation dose reduction techniques were utilized, including automated exposure control and exposure modulation based on body size.  This report was finalized on 9/14/2022 12:18 AM by Dr. Zeinab Oviedo M.D.        I ordered the above noted radiological studies. Reviewed by me and discussed with radiologist.  See dictation for official radiology interpretation.      PROCEDURES    Procedures      MEDICATIONS GIVEN IN ER    Medications   sodium chloride 0.9 % flush 10 mL (has no administration in time range)   acetaminophen (TYLENOL) tablet 650 mg (has no administration in time range)   ondansetron (ZOFRAN) injection 4 mg (4 mg Intravenous Given 9/14/22 0051)   thiamine (B-1) 100 mg, folic acid 1 mg in sodium chloride 0.9 % 1,000 mL infusion (0 mL/hr Intravenous Stopped 9/14/22 0306)   magnesium sulfate 2g/50 mL (PREMIX) infusion (0 g Intravenous Stopped 9/14/22 0306)    potassium chloride (K-DUR,KLOR-CON) ER tablet 40 mEq (40 mEq Oral Given 9/14/22 0042)         PROGRESS, DATA ANALYSIS, CONSULTS, AND MEDICAL DECISION MAKING    All labs have been independently reviewed by me.  All radiology studies have been reviewed by me and discussed with radiologist dictating the report.   EKG's independently viewed and interpreted by me.  Discussion below represents my analysis of pertinent findings related to patient's condition, differential diagnosis, treatment plan and final disposition.    DDx: Includes but not limited to facial bone fracture, minor head injury, concussion, intracranial hemorrhage, C-spine injury, hematoma, contusion    ED Course as of 09/14/22 0310   Tue Sep 13, 2022   2325 Ethanol(!): 203 [SHARI]   2325 WBC: 4.42 [SHARI]   2325 Hemoglobin(!): 11.8 [SHARI]   2325 Hematocrit(!): 33.8 [SHARI]   2325 Platelets: 146 [SHARI]   2325 Glucose: 79 [SHARI]   2325 BUN(!): 5 [SHARI]   2325 Creatinine(!): 0.46 [SHARI]   2325 Sodium(!): 125 [SHARI]   2325 Potassium(!): 3.2 [SHARI]   2325 Magnesium(!): 1.2 [SHARI]   2325 Chloride(!): 90 [SHARI]   2325 CO2(!): 21.4 [SHARI]   2351 Patient's potassium is 3.2 and magnesium is 1.2, will replace. [SHARI]   Wed Sep 14, 2022   0244 Patient rechecked, resting comfortably in bed, no acute distress.  Patient's  is at the bedside at this time and feels comfortable taking the patient home. [SHARI]      ED Course User Index  [SHARI] Ricky Palomo PA       MDM: Patient CT scan showed no evidence for acute intracranial abnormality, no C-spine injury and no facial bone fractures were seen.  Patient's alcohol level was 200 and she has been observed for 4 hours and she has no legally sober.  Patient has chronic electrolyte abnormalities from her excessive alcohol use.  She has been instructed on decreasing her intake and focusing on a healthy diet.  Patient and patient's  feel comfortable with discharge home and have been given strict head injury and return to ER precautions.  Vital  signs are stable, patient is safe for discharge.    PPE: The patient wore a surgical mask throughout the entire patient encounter. I wore an N95.    AS OF 03:10 EDT VITALS:    BP - 140/81  HR - 93  TEMP - 99.2 °F (37.3 °C) (Tympanic)  O2 SATS - 93%        DIAGNOSIS  Final diagnoses:   Fall, initial encounter   Contusion of face, initial encounter   Acute alcoholic intoxication without complication (HCC)   Hyponatremia   Hypokalemia   Hypomagnesemia   Hypertension, unspecified type   NICM (nonischemic cardiomyopathy) (HCC)         DISPOSITION  DISCHARGE    Patient discharged in stable condition.    Reviewed implications of results, diagnosis, meds, responsibility to follow up, warning signs and symptoms of possible worsening, potential complications and reasons to return to ER.    Patient/Family voiced understanding of above instructions.    Discussed plan for discharge, as there is no emergent indication for admission. Patient referred to primary care provider for BP management due to today's BP. Pt/family is agreeable and understands need for follow up and repeat testing.  Pt is aware that discharge does not mean that nothing is wrong but it indicates no emergency is present that requires admission and they must continue care with follow-up as given below or physician of their choice.     FOLLOW-UP  Deana Wilcox MD  [address] [address]  [phone]    Schedule an appointment as soon as possible for a visit in 2 days           Medication List      No changes were made to your prescriptions during this visit.           Note Disclaimer: At [facility], we believe that sharing information builds trust and better relationships. You are receiving this note because you recently visited [facility]. It is possible you will see health information before a provider has talked with you about it. This kind of information can be easy to misunderstand. To help you fully understand  what it means for your health, we urge you to discuss this note with your provider.       Ricky Palomo PA  09/14/22 0314

## 2022-09-14 NOTE — ED NOTES
Patient to ED via EMS from home c/o unwitnessed fall. Patient is intoxicated per EMS. Patient hit her R eye on a wooden dresser, appears with swelling to they eye area and lacerations to the R cheek. Patient denies LOC.

## 2022-09-14 NOTE — DISCHARGE INSTRUCTIONS
Home, rest, apply ice to affected area, Tylenol as needed for pain.  Home medicine as prescribed, decrease your alcohol intake and eat a healthy diet.  Follow up with PCP for recheck. Return to care with further concerns.

## 2022-09-23 DIAGNOSIS — Z79.890 POST-MENOPAUSE ON HRT (HORMONE REPLACEMENT THERAPY): ICD-10-CM

## 2022-09-26 RX ORDER — ESTRADIOL 0.07 MG/D
FILM, EXTENDED RELEASE TRANSDERMAL
Qty: 8 PATCH | Refills: 11 | Status: SHIPPED | OUTPATIENT
Start: 2022-09-26

## 2022-09-29 ENCOUNTER — TELEPHONE (OUTPATIENT)
Dept: CARDIOLOGY | Facility: CLINIC | Age: 67
End: 2022-09-29

## 2022-09-29 NOTE — TELEPHONE ENCOUNTER
"Caller: Sturgeon, Victoria H    Relationship: Self    Best call back number: [phone]    Additional notes: PT CALLING IN TO REQUEST A RECOMMENDATION FROM DR JOHNSON AS SHE HAS CARDIOMYOPATHY - PT SAYS SENDING RECOMMENDATION VIA "[facility] OR THROUGH PHONE CALL IS OKAY, INCLUDING VM - PT ALSO WANTED TO PASS ALONG A MESSAGE     \"THANK YOU SO MUCH FOR BEING THERE FOR ME THROUGH ALL OF THESE YEARS, I WILL MISS YOU BUT I AM SO HAPPY FOR YOU AND HAPPY correction\"      "

## 2022-09-29 NOTE — TELEPHONE ENCOUNTER
Spoke w pt over the phone. Dr. Harmon has recommended Dr. Radha Simons at this time as she specializes in cardiomyopathy.

## 2022-11-09 DIAGNOSIS — Z79.890 POST-MENOPAUSE ON HRT (HORMONE REPLACEMENT THERAPY): ICD-10-CM

## 2022-11-10 RX ORDER — PROGESTERONE 100 MG/1
CAPSULE ORAL
Qty: 30 CAPSULE | Refills: 1 | Status: SHIPPED | OUTPATIENT
Start: 2022-11-10 | End: 2023-02-09

## 2022-11-10 NOTE — TELEPHONE ENCOUNTER
Has appointment in January. She may have recently had refill of estradiol patch but I have recommended she discontinue HRT  Please call her pharmacy and see if she has been dispensed the estradiol patch and hold on any further dispensing. Needs to have appointment to discuss further use of HRT.    Zeynep Phan MD  11/10/2022  09:31 EST

## 2023-02-08 DIAGNOSIS — Z79.890 POST-MENOPAUSE ON HRT (HORMONE REPLACEMENT THERAPY): ICD-10-CM

## 2023-02-09 RX ORDER — PROGESTERONE 100 MG/1
CAPSULE ORAL
Qty: 30 CAPSULE | Refills: 1 | Status: SHIPPED | OUTPATIENT
Start: 2023-02-09

## 2023-05-28 DIAGNOSIS — Z79.890 POST-MENOPAUSE ON HRT (HORMONE REPLACEMENT THERAPY): ICD-10-CM

## 2023-06-05 RX ORDER — PROGESTERONE 100 MG/1
CAPSULE ORAL
Qty: 30 CAPSULE | Refills: 1 | OUTPATIENT
Start: 2023-06-05

## 2023-06-05 NOTE — TELEPHONE ENCOUNTER
She shouldn't be on HRT any longer, I had refused Rx of her estradiol patch last year. Please call pharmacy and see if they are still dispensing her estradiol patch, they should not be    Zeynep Phan MD  6/5/2023  10:03 EDT

## 2023-12-09 ENCOUNTER — APPOINTMENT (OUTPATIENT)
Dept: CT IMAGING | Facility: HOSPITAL | Age: 68
End: 2023-12-09
Payer: MEDICARE

## 2023-12-09 ENCOUNTER — HOSPITAL ENCOUNTER (INPATIENT)
Facility: HOSPITAL | Age: 68
LOS: 2 days | Discharge: HOME | End: 2023-12-12
Attending: STUDENT IN AN ORGANIZED HEALTH CARE EDUCATION/TRAINING PROGRAM | Admitting: HOSPITALIST
Payer: MEDICARE

## 2023-12-09 DIAGNOSIS — K52.9 COLITIS: Primary | ICD-10-CM

## 2023-12-09 DIAGNOSIS — N95.0 PMB (POSTMENOPAUSAL BLEEDING): ICD-10-CM

## 2023-12-09 DIAGNOSIS — R10.30 LOWER ABDOMINAL PAIN: ICD-10-CM

## 2023-12-09 DIAGNOSIS — R93.89 THICKENED ENDOMETRIUM: ICD-10-CM

## 2023-12-09 PROBLEM — R19.7 DIARRHEA: Status: ACTIVE | Noted: 2023-12-09

## 2023-12-09 PROBLEM — R10.9 ABDOMINAL PAIN: Status: ACTIVE | Noted: 2023-12-09

## 2023-12-09 PROBLEM — N93.9 VAGINAL BLEEDING: Status: ACTIVE | Noted: 2023-12-09

## 2023-12-09 LAB
ALBUMIN SERPL-MCNC: 3.6 G/DL (ref 3.5–5.2)
ALBUMIN/GLOB SERPL: 1.4 G/DL
ALP SERPL-CCNC: 136 U/L (ref 39–117)
ALT SERPL W P-5'-P-CCNC: 117 U/L (ref 1–33)
AMPHET+METHAMPHET UR QL: NEGATIVE
ANION GAP SERPL CALCULATED.3IONS-SCNC: 15.3 MMOL/L (ref 5–15)
APAP SERPL-MCNC: <5 MCG/ML (ref 0–30)
APTT PPP: 27.4 SECONDS (ref 22.7–35.4)
AST SERPL-CCNC: 96 U/L (ref 1–32)
BACTERIA UR QL AUTO: ABNORMAL /HPF
BARBITURATES UR QL SCN: NEGATIVE
BASOPHILS # BLD AUTO: 0.03 10*3/MM3 (ref 0–0.2)
BASOPHILS NFR BLD AUTO: 0.3 % (ref 0–1.5)
BENZODIAZ UR QL SCN: NEGATIVE
BILIRUB SERPL-MCNC: 0.3 MG/DL (ref 0–1.2)
BILIRUB UR QL STRIP: NEGATIVE
BUN SERPL-MCNC: 9 MG/DL (ref 8–23)
BUN/CREAT SERPL: 17 (ref 7–25)
CALCIUM SPEC-SCNC: 8.1 MG/DL (ref 8.6–10.5)
CANNABINOIDS SERPL QL: NEGATIVE
CHLORIDE SERPL-SCNC: 96 MMOL/L (ref 98–107)
CLARITY UR: ABNORMAL
CO2 SERPL-SCNC: 17.7 MMOL/L (ref 22–29)
COCAINE UR QL: NEGATIVE
COLOR UR: YELLOW
CREAT SERPL-MCNC: 0.53 MG/DL (ref 0.57–1)
DEPRECATED RDW RBC AUTO: 45.2 FL (ref 37–54)
EGFRCR SERPLBLD CKD-EPI 2021: 100.9 ML/MIN/1.73
EOSINOPHIL # BLD AUTO: 0.01 10*3/MM3 (ref 0–0.4)
EOSINOPHIL NFR BLD AUTO: 0.1 % (ref 0.3–6.2)
ERYTHROCYTE [DISTWIDTH] IN BLOOD BY AUTOMATED COUNT: 13.3 % (ref 12.3–15.4)
ETHANOL BLD-MCNC: 12 MG/DL (ref 0–10)
ETHANOL UR QL: 0.01 %
FENTANYL UR-MCNC: NEGATIVE NG/ML
GLOBULIN UR ELPH-MCNC: 2.6 GM/DL
GLUCOSE SERPL-MCNC: 104 MG/DL (ref 65–99)
GLUCOSE UR STRIP-MCNC: ABNORMAL MG/DL
HCT VFR BLD AUTO: 44.2 % (ref 34–46.6)
HGB BLD-MCNC: 14.4 G/DL (ref 12–15.9)
HGB UR QL STRIP.AUTO: ABNORMAL
HYALINE CASTS UR QL AUTO: ABNORMAL /LPF
IMM GRANULOCYTES # BLD AUTO: 0.04 10*3/MM3 (ref 0–0.05)
IMM GRANULOCYTES NFR BLD AUTO: 0.4 % (ref 0–0.5)
INR PPP: 1.01 (ref 0.9–1.1)
KETONES UR QL STRIP: ABNORMAL
LEUKOCYTE ESTERASE UR QL STRIP.AUTO: NEGATIVE
LIPASE SERPL-CCNC: 30 U/L (ref 13–60)
LYMPHOCYTES # BLD AUTO: 0.98 10*3/MM3 (ref 0.7–3.1)
LYMPHOCYTES NFR BLD AUTO: 10.5 % (ref 19.6–45.3)
MCH RBC QN AUTO: 30.3 PG (ref 26.6–33)
MCHC RBC AUTO-ENTMCNC: 32.6 G/DL (ref 31.5–35.7)
MCV RBC AUTO: 93.1 FL (ref 79–97)
METHADONE UR QL SCN: NEGATIVE
MONOCYTES # BLD AUTO: 0.64 10*3/MM3 (ref 0.1–0.9)
MONOCYTES NFR BLD AUTO: 6.9 % (ref 5–12)
NEUTROPHILS NFR BLD AUTO: 7.61 10*3/MM3 (ref 1.7–7)
NEUTROPHILS NFR BLD AUTO: 81.8 % (ref 42.7–76)
NITRITE UR QL STRIP: NEGATIVE
NRBC BLD AUTO-RTO: 0 /100 WBC (ref 0–0.2)
OPIATES UR QL: POSITIVE
OXYCODONE UR QL SCN: NEGATIVE
PH UR STRIP.AUTO: 6.5 [PH] (ref 5–8)
PLATELET # BLD AUTO: 232 10*3/MM3 (ref 140–450)
PMV BLD AUTO: 10.2 FL (ref 6–12)
POTASSIUM SERPL-SCNC: 3.6 MMOL/L (ref 3.5–5.2)
PROT SERPL-MCNC: 6.2 G/DL (ref 6–8.5)
PROT UR QL STRIP: ABNORMAL
PROTHROMBIN TIME: 13.4 SECONDS (ref 11.7–14.2)
RBC # BLD AUTO: 4.75 10*6/MM3 (ref 3.77–5.28)
RBC # UR STRIP: ABNORMAL /HPF
REF LAB TEST METHOD: ABNORMAL
SALICYLATES SERPL-MCNC: <0.3 MG/DL
SODIUM SERPL-SCNC: 129 MMOL/L (ref 136–145)
SP GR UR STRIP: 1.03 (ref 1–1.03)
SQUAMOUS #/AREA URNS HPF: ABNORMAL /HPF
UROBILINOGEN UR QL STRIP: ABNORMAL
WBC # UR STRIP: ABNORMAL /HPF
WBC NRBC COR # BLD AUTO: 9.31 10*3/MM3 (ref 3.4–10.8)

## 2023-12-09 PROCEDURE — 25010000002 PIPERACILLIN SOD-TAZOBACTAM PER 1 G: Performed by: STUDENT IN AN ORGANIZED HEALTH CARE EDUCATION/TRAINING PROGRAM

## 2023-12-09 PROCEDURE — 83690 ASSAY OF LIPASE: CPT | Performed by: STUDENT IN AN ORGANIZED HEALTH CARE EDUCATION/TRAINING PROGRAM

## 2023-12-09 PROCEDURE — 80307 DRUG TEST PRSMV CHEM ANLYZR: CPT | Performed by: STUDENT IN AN ORGANIZED HEALTH CARE EDUCATION/TRAINING PROGRAM

## 2023-12-09 PROCEDURE — G0378 HOSPITAL OBSERVATION PER HR: HCPCS

## 2023-12-09 PROCEDURE — 25010000002 MORPHINE PER 10 MG: Performed by: STUDENT IN AN ORGANIZED HEALTH CARE EDUCATION/TRAINING PROGRAM

## 2023-12-09 PROCEDURE — 84145 PROCALCITONIN (PCT): CPT | Performed by: NURSE PRACTITIONER

## 2023-12-09 PROCEDURE — 87040 BLOOD CULTURE FOR BACTERIA: CPT | Performed by: STUDENT IN AN ORGANIZED HEALTH CARE EDUCATION/TRAINING PROGRAM

## 2023-12-09 PROCEDURE — 81001 URINALYSIS AUTO W/SCOPE: CPT | Performed by: STUDENT IN AN ORGANIZED HEALTH CARE EDUCATION/TRAINING PROGRAM

## 2023-12-09 PROCEDURE — 85025 COMPLETE CBC W/AUTO DIFF WBC: CPT | Performed by: STUDENT IN AN ORGANIZED HEALTH CARE EDUCATION/TRAINING PROGRAM

## 2023-12-09 PROCEDURE — 25810000003 LACTATED RINGERS SOLUTION: Performed by: STUDENT IN AN ORGANIZED HEALTH CARE EDUCATION/TRAINING PROGRAM

## 2023-12-09 PROCEDURE — 74177 CT ABD & PELVIS W/CONTRAST: CPT

## 2023-12-09 PROCEDURE — 36415 COLL VENOUS BLD VENIPUNCTURE: CPT

## 2023-12-09 PROCEDURE — 25010000002 ONDANSETRON PER 1 MG: Performed by: STUDENT IN AN ORGANIZED HEALTH CARE EDUCATION/TRAINING PROGRAM

## 2023-12-09 PROCEDURE — 80143 DRUG ASSAY ACETAMINOPHEN: CPT | Performed by: STUDENT IN AN ORGANIZED HEALTH CARE EDUCATION/TRAINING PROGRAM

## 2023-12-09 PROCEDURE — 25510000001 IOPAMIDOL 61 % SOLUTION: Performed by: STUDENT IN AN ORGANIZED HEALTH CARE EDUCATION/TRAINING PROGRAM

## 2023-12-09 PROCEDURE — 85610 PROTHROMBIN TIME: CPT | Performed by: STUDENT IN AN ORGANIZED HEALTH CARE EDUCATION/TRAINING PROGRAM

## 2023-12-09 PROCEDURE — 82077 ASSAY SPEC XCP UR&BREATH IA: CPT | Performed by: STUDENT IN AN ORGANIZED HEALTH CARE EDUCATION/TRAINING PROGRAM

## 2023-12-09 PROCEDURE — 85730 THROMBOPLASTIN TIME PARTIAL: CPT | Performed by: STUDENT IN AN ORGANIZED HEALTH CARE EDUCATION/TRAINING PROGRAM

## 2023-12-09 PROCEDURE — 99285 EMERGENCY DEPT VISIT HI MDM: CPT

## 2023-12-09 PROCEDURE — 80053 COMPREHEN METABOLIC PANEL: CPT | Performed by: STUDENT IN AN ORGANIZED HEALTH CARE EDUCATION/TRAINING PROGRAM

## 2023-12-09 PROCEDURE — 80179 DRUG ASSAY SALICYLATE: CPT | Performed by: STUDENT IN AN ORGANIZED HEALTH CARE EDUCATION/TRAINING PROGRAM

## 2023-12-09 RX ORDER — ONDANSETRON 2 MG/ML
4 INJECTION INTRAMUSCULAR; INTRAVENOUS ONCE
Status: COMPLETED | OUTPATIENT
Start: 2023-12-09 | End: 2023-12-09

## 2023-12-09 RX ORDER — MORPHINE SULFATE 2 MG/ML
6 INJECTION, SOLUTION INTRAMUSCULAR; INTRAVENOUS ONCE
Status: COMPLETED | OUTPATIENT
Start: 2023-12-09 | End: 2023-12-09

## 2023-12-09 RX ADMIN — PIPERACILLIN SODIUM AND TAZOBACTAM SODIUM 3.38 G: 3; .375 INJECTION, SOLUTION INTRAVENOUS at 23:22

## 2023-12-09 RX ADMIN — ONDANSETRON 4 MG: 2 INJECTION INTRAMUSCULAR; INTRAVENOUS at 21:20

## 2023-12-09 RX ADMIN — SODIUM CHLORIDE, POTASSIUM CHLORIDE, SODIUM LACTATE AND CALCIUM CHLORIDE 1000 ML: 600; 310; 30; 20 INJECTION, SOLUTION INTRAVENOUS at 18:13

## 2023-12-09 RX ADMIN — ONDANSETRON 4 MG: 2 INJECTION INTRAMUSCULAR; INTRAVENOUS at 18:15

## 2023-12-09 RX ADMIN — IOPAMIDOL 85 ML: 612 INJECTION, SOLUTION INTRAVENOUS at 19:54

## 2023-12-09 RX ADMIN — MORPHINE SULFATE 6 MG: 2 INJECTION, SOLUTION INTRAMUSCULAR; INTRAVENOUS at 21:20

## 2023-12-09 RX ADMIN — MORPHINE SULFATE 6 MG: 2 INJECTION, SOLUTION INTRAMUSCULAR; INTRAVENOUS at 18:16

## 2023-12-10 ENCOUNTER — APPOINTMENT (OUTPATIENT)
Dept: MRI IMAGING | Facility: HOSPITAL | Age: 68
End: 2023-12-10
Payer: MEDICARE

## 2023-12-10 ENCOUNTER — APPOINTMENT (OUTPATIENT)
Dept: ULTRASOUND IMAGING | Facility: HOSPITAL | Age: 68
End: 2023-12-10
Payer: MEDICARE

## 2023-12-10 PROBLEM — N95.0 PMB (POSTMENOPAUSAL BLEEDING): Status: ACTIVE | Noted: 2023-12-09

## 2023-12-10 PROBLEM — R93.89 THICKENED ENDOMETRIUM: Status: ACTIVE | Noted: 2023-12-09

## 2023-12-10 LAB
ALBUMIN SERPL-MCNC: 3.8 G/DL (ref 3.5–5.2)
ALBUMIN/GLOB SERPL: 1.3 G/DL
ALP SERPL-CCNC: 199 U/L (ref 39–117)
ALT SERPL W P-5'-P-CCNC: 121 U/L (ref 1–33)
ANION GAP SERPL CALCULATED.3IONS-SCNC: 14.1 MMOL/L (ref 5–15)
AST SERPL-CCNC: 98 U/L (ref 1–32)
BASOPHILS # BLD AUTO: 0.04 10*3/MM3 (ref 0–0.2)
BASOPHILS NFR BLD AUTO: 0.5 % (ref 0–1.5)
BILIRUB SERPL-MCNC: 0.3 MG/DL (ref 0–1.2)
BUN SERPL-MCNC: 7 MG/DL (ref 8–23)
BUN/CREAT SERPL: 9.9 (ref 7–25)
CALCIUM SPEC-SCNC: 8.5 MG/DL (ref 8.6–10.5)
CHLORIDE SERPL-SCNC: 96 MMOL/L (ref 98–107)
CO2 SERPL-SCNC: 19.9 MMOL/L (ref 22–29)
CREAT SERPL-MCNC: 0.71 MG/DL (ref 0.57–1)
DEPRECATED RDW RBC AUTO: 45.6 FL (ref 37–54)
EGFRCR SERPLBLD CKD-EPI 2021: 92.7 ML/MIN/1.73
EOSINOPHIL # BLD AUTO: 0.14 10*3/MM3 (ref 0–0.4)
EOSINOPHIL NFR BLD AUTO: 1.6 % (ref 0.3–6.2)
ERYTHROCYTE [DISTWIDTH] IN BLOOD BY AUTOMATED COUNT: 13.5 % (ref 12.3–15.4)
GLOBULIN UR ELPH-MCNC: 3 GM/DL
GLUCOSE SERPL-MCNC: 107 MG/DL (ref 65–99)
HCT VFR BLD AUTO: 40 % (ref 34–46.6)
HCT VFR BLD AUTO: 40.3 % (ref 34–46.6)
HGB BLD-MCNC: 13.3 G/DL (ref 12–15.9)
HGB BLD-MCNC: 14.3 G/DL (ref 12–15.9)
IMM GRANULOCYTES # BLD AUTO: 0.03 10*3/MM3 (ref 0–0.05)
IMM GRANULOCYTES NFR BLD AUTO: 0.4 % (ref 0–0.5)
LYMPHOCYTES # BLD AUTO: 1.62 10*3/MM3 (ref 0.7–3.1)
LYMPHOCYTES NFR BLD AUTO: 18.9 % (ref 19.6–45.3)
MCH RBC QN AUTO: 33.3 PG (ref 26.6–33)
MCHC RBC AUTO-ENTMCNC: 35.5 G/DL (ref 31.5–35.7)
MCV RBC AUTO: 93.7 FL (ref 79–97)
MONOCYTES # BLD AUTO: 0.95 10*3/MM3 (ref 0.1–0.9)
MONOCYTES NFR BLD AUTO: 11.1 % (ref 5–12)
NEUTROPHILS NFR BLD AUTO: 5.79 10*3/MM3 (ref 1.7–7)
NEUTROPHILS NFR BLD AUTO: 67.5 % (ref 42.7–76)
NRBC BLD AUTO-RTO: 0 /100 WBC (ref 0–0.2)
PLATELET # BLD AUTO: 244 10*3/MM3 (ref 140–450)
PMV BLD AUTO: 10.9 FL (ref 6–12)
POTASSIUM SERPL-SCNC: 3.1 MMOL/L (ref 3.5–5.2)
PROCALCITONIN SERPL-MCNC: 0.23 NG/ML (ref 0–0.25)
PROT SERPL-MCNC: 6.8 G/DL (ref 6–8.5)
RBC # BLD AUTO: 4.3 10*6/MM3 (ref 3.77–5.28)
SODIUM SERPL-SCNC: 130 MMOL/L (ref 136–145)
WBC NRBC COR # BLD AUTO: 8.57 10*3/MM3 (ref 3.4–10.8)

## 2023-12-10 PROCEDURE — 85014 HEMATOCRIT: CPT | Performed by: NURSE PRACTITIONER

## 2023-12-10 PROCEDURE — 0 GADOBENATE DIMEGLUMINE 529 MG/ML SOLUTION: Performed by: HOSPITALIST

## 2023-12-10 PROCEDURE — 36415 COLL VENOUS BLD VENIPUNCTURE: CPT | Performed by: NURSE PRACTITIONER

## 2023-12-10 PROCEDURE — 87507 IADNA-DNA/RNA PROBE TQ 12-25: CPT | Performed by: HOSPITALIST

## 2023-12-10 PROCEDURE — 85025 COMPLETE CBC W/AUTO DIFF WBC: CPT | Performed by: NURSE PRACTITIONER

## 2023-12-10 PROCEDURE — 25010000002 THIAMINE HCL 200 MG/2ML SOLUTION: Performed by: HOSPITALIST

## 2023-12-10 PROCEDURE — 76830 TRANSVAGINAL US NON-OB: CPT

## 2023-12-10 PROCEDURE — 25010000002 ONDANSETRON PER 1 MG: Performed by: NURSE PRACTITIONER

## 2023-12-10 PROCEDURE — 25810000003 SODIUM CHLORIDE 0.9 % SOLUTION: Performed by: NURSE PRACTITIONER

## 2023-12-10 PROCEDURE — 99222 1ST HOSP IP/OBS MODERATE 55: CPT | Performed by: INTERNAL MEDICINE

## 2023-12-10 PROCEDURE — 87493 C DIFF AMPLIFIED PROBE: CPT | Performed by: HOSPITALIST

## 2023-12-10 PROCEDURE — 80053 COMPREHEN METABOLIC PANEL: CPT | Performed by: NURSE PRACTITIONER

## 2023-12-10 PROCEDURE — 25010000002 PIPERACILLIN SOD-TAZOBACTAM PER 1 G: Performed by: HOSPITALIST

## 2023-12-10 PROCEDURE — 87449 NOS EACH ORGANISM AG IA: CPT | Performed by: HOSPITALIST

## 2023-12-10 PROCEDURE — 74183 MRI ABD W/O CNTR FLWD CNTR: CPT

## 2023-12-10 PROCEDURE — 85018 HEMOGLOBIN: CPT | Performed by: NURSE PRACTITIONER

## 2023-12-10 PROCEDURE — 76856 US EXAM PELVIC COMPLETE: CPT

## 2023-12-10 PROCEDURE — 25010000002 MORPHINE PER 10 MG: Performed by: NURSE PRACTITIONER

## 2023-12-10 PROCEDURE — A9577 INJ MULTIHANCE: HCPCS | Performed by: HOSPITALIST

## 2023-12-10 RX ORDER — PANTOPRAZOLE SODIUM 40 MG/1
40 TABLET, DELAYED RELEASE ORAL
Status: DISCONTINUED | OUTPATIENT
Start: 2023-12-10 | End: 2023-12-11

## 2023-12-10 RX ORDER — FERROUS SULFATE 325(65) MG
325 TABLET ORAL 3 TIMES WEEKLY
COMMUNITY

## 2023-12-10 RX ORDER — SODIUM CHLORIDE 9 MG/ML
100 INJECTION, SOLUTION INTRAVENOUS CONTINUOUS
Status: DISCONTINUED | OUTPATIENT
Start: 2023-12-10 | End: 2023-12-12 | Stop reason: HOSPADM

## 2023-12-10 RX ORDER — SODIUM CHLORIDE 0.9 % (FLUSH) 0.9 %
10 SYRINGE (ML) INJECTION AS NEEDED
Status: DISCONTINUED | OUTPATIENT
Start: 2023-12-09 | End: 2023-12-12 | Stop reason: HOSPADM

## 2023-12-10 RX ORDER — SODIUM CHLORIDE 9 MG/ML
40 INJECTION, SOLUTION INTRAVENOUS AS NEEDED
Status: DISCONTINUED | OUTPATIENT
Start: 2023-12-09 | End: 2023-12-12 | Stop reason: HOSPADM

## 2023-12-10 RX ORDER — MIDAZOLAM HYDROCHLORIDE 1 MG/ML
4 INJECTION INTRAMUSCULAR; INTRAVENOUS
Status: DISCONTINUED | OUTPATIENT
Start: 2023-12-10 | End: 2023-12-12

## 2023-12-10 RX ORDER — PROGESTERONE 100 MG/1
100 CAPSULE ORAL DAILY
Status: DISCONTINUED | OUTPATIENT
Start: 2023-12-10 | End: 2023-12-12 | Stop reason: HOSPADM

## 2023-12-10 RX ORDER — LORAZEPAM 1 MG/1
1 TABLET ORAL
Status: DISCONTINUED | OUTPATIENT
Start: 2023-12-10 | End: 2023-12-12

## 2023-12-10 RX ORDER — HYDROCODONE BITARTRATE AND ACETAMINOPHEN 5; 325 MG/1; MG/1
1 TABLET ORAL EVERY 6 HOURS PRN
Status: DISCONTINUED | OUTPATIENT
Start: 2023-12-10 | End: 2023-12-12

## 2023-12-10 RX ORDER — AMOXICILLIN 250 MG
2 CAPSULE ORAL 2 TIMES DAILY
Status: DISCONTINUED | OUTPATIENT
Start: 2023-12-10 | End: 2023-12-10

## 2023-12-10 RX ORDER — SODIUM CHLORIDE 0.9 % (FLUSH) 0.9 %
10 SYRINGE (ML) INJECTION EVERY 12 HOURS SCHEDULED
Status: DISCONTINUED | OUTPATIENT
Start: 2023-12-10 | End: 2023-12-12 | Stop reason: HOSPADM

## 2023-12-10 RX ORDER — ROSUVASTATIN CALCIUM 5 MG/1
5 TABLET, COATED ORAL DAILY
Status: DISCONTINUED | OUTPATIENT
Start: 2023-12-10 | End: 2023-12-12 | Stop reason: HOSPADM

## 2023-12-10 RX ORDER — THIAMINE HYDROCHLORIDE 100 MG/ML
200 INJECTION, SOLUTION INTRAMUSCULAR; INTRAVENOUS EVERY 8 HOURS SCHEDULED
Status: DISCONTINUED | OUTPATIENT
Start: 2023-12-10 | End: 2023-12-12 | Stop reason: HOSPADM

## 2023-12-10 RX ORDER — ONDANSETRON 2 MG/ML
4 INJECTION INTRAMUSCULAR; INTRAVENOUS EVERY 6 HOURS PRN
Status: DISCONTINUED | OUTPATIENT
Start: 2023-12-09 | End: 2023-12-12 | Stop reason: HOSPADM

## 2023-12-10 RX ORDER — MIDAZOLAM HYDROCHLORIDE 1 MG/ML
2 INJECTION INTRAMUSCULAR; INTRAVENOUS
Status: DISCONTINUED | OUTPATIENT
Start: 2023-12-10 | End: 2023-12-12

## 2023-12-10 RX ORDER — LIOTHYRONINE SODIUM 5 UG/1
5 TABLET ORAL DAILY
Status: DISCONTINUED | OUTPATIENT
Start: 2023-12-10 | End: 2023-12-12 | Stop reason: HOSPADM

## 2023-12-10 RX ORDER — ESTRADIOL 0.07 MG/D
1 PATCH TRANSDERMAL WEEKLY
Status: DISCONTINUED | OUTPATIENT
Start: 2023-12-10 | End: 2023-12-12 | Stop reason: HOSPADM

## 2023-12-10 RX ORDER — DULOXETIN HYDROCHLORIDE 60 MG/1
60 CAPSULE, DELAYED RELEASE ORAL DAILY
Status: DISCONTINUED | OUTPATIENT
Start: 2023-12-10 | End: 2023-12-12 | Stop reason: HOSPADM

## 2023-12-10 RX ORDER — OXYBUTYNIN CHLORIDE 10 MG/1
10 TABLET, EXTENDED RELEASE ORAL 2 TIMES DAILY
Status: DISCONTINUED | OUTPATIENT
Start: 2023-12-10 | End: 2023-12-12 | Stop reason: HOSPADM

## 2023-12-10 RX ORDER — BISACODYL 5 MG/1
5 TABLET, DELAYED RELEASE ORAL DAILY PRN
Status: DISCONTINUED | OUTPATIENT
Start: 2023-12-09 | End: 2023-12-10

## 2023-12-10 RX ORDER — ACETAMINOPHEN 650 MG/1
650 SUPPOSITORY RECTAL EVERY 4 HOURS PRN
Status: DISCONTINUED | OUTPATIENT
Start: 2023-12-09 | End: 2023-12-12 | Stop reason: HOSPADM

## 2023-12-10 RX ORDER — CARVEDILOL 12.5 MG/1
12.5 TABLET ORAL 2 TIMES DAILY WITH MEALS
Status: DISCONTINUED | OUTPATIENT
Start: 2023-12-10 | End: 2023-12-12 | Stop reason: HOSPADM

## 2023-12-10 RX ORDER — TRAZODONE HYDROCHLORIDE 100 MG/1
100 TABLET ORAL NIGHTLY
Status: DISCONTINUED | OUTPATIENT
Start: 2023-12-10 | End: 2023-12-12 | Stop reason: HOSPADM

## 2023-12-10 RX ORDER — NALOXONE HCL 0.4 MG/ML
0.4 VIAL (ML) INJECTION
Status: DISCONTINUED | OUTPATIENT
Start: 2023-12-09 | End: 2023-12-12 | Stop reason: HOSPADM

## 2023-12-10 RX ORDER — LORAZEPAM 1 MG/1
2 TABLET ORAL
Status: DISCONTINUED | OUTPATIENT
Start: 2023-12-10 | End: 2023-12-12

## 2023-12-10 RX ORDER — MULTIPLE VITAMINS W/ MINERALS TAB 9MG-400MCG
1 TAB ORAL DAILY
Status: DISCONTINUED | OUTPATIENT
Start: 2023-12-10 | End: 2023-12-12 | Stop reason: HOSPADM

## 2023-12-10 RX ORDER — BISACODYL 10 MG
10 SUPPOSITORY, RECTAL RECTAL DAILY PRN
Status: DISCONTINUED | OUTPATIENT
Start: 2023-12-09 | End: 2023-12-10

## 2023-12-10 RX ORDER — FOLIC ACID 1 MG/1
1 TABLET ORAL DAILY
Status: DISCONTINUED | OUTPATIENT
Start: 2023-12-10 | End: 2023-12-12 | Stop reason: HOSPADM

## 2023-12-10 RX ORDER — MELATONIN
5000 DAILY
COMMUNITY

## 2023-12-10 RX ORDER — MORPHINE SULFATE 2 MG/ML
1 INJECTION, SOLUTION INTRAMUSCULAR; INTRAVENOUS EVERY 4 HOURS PRN
Status: DISCONTINUED | OUTPATIENT
Start: 2023-12-09 | End: 2023-12-12

## 2023-12-10 RX ORDER — BUDESONIDE AND FORMOTEROL FUMARATE DIHYDRATE 160; 4.5 UG/1; UG/1
2 AEROSOL RESPIRATORY (INHALATION)
Status: DISCONTINUED | OUTPATIENT
Start: 2023-12-10 | End: 2023-12-12 | Stop reason: HOSPADM

## 2023-12-10 RX ORDER — POLYETHYLENE GLYCOL 3350 17 G/17G
17 POWDER, FOR SOLUTION ORAL DAILY PRN
Status: DISCONTINUED | OUTPATIENT
Start: 2023-12-09 | End: 2023-12-10

## 2023-12-10 RX ORDER — TRAZODONE HYDROCHLORIDE 100 MG/1
100 TABLET ORAL NIGHTLY
COMMUNITY

## 2023-12-10 RX ORDER — ACETAMINOPHEN 160 MG/5ML
650 SOLUTION ORAL EVERY 4 HOURS PRN
Status: DISCONTINUED | OUTPATIENT
Start: 2023-12-09 | End: 2023-12-12 | Stop reason: HOSPADM

## 2023-12-10 RX ORDER — THIAMINE HYDROCHLORIDE 100 MG/ML
100 INJECTION, SOLUTION INTRAMUSCULAR; INTRAVENOUS DAILY
Status: DISCONTINUED | OUTPATIENT
Start: 2023-12-10 | End: 2023-12-10 | Stop reason: SDUPTHER

## 2023-12-10 RX ORDER — POTASSIUM CHLORIDE 750 MG/1
40 TABLET, FILM COATED, EXTENDED RELEASE ORAL EVERY 4 HOURS
Status: COMPLETED | OUTPATIENT
Start: 2023-12-10 | End: 2023-12-10

## 2023-12-10 RX ORDER — ACETAMINOPHEN 325 MG/1
650 TABLET ORAL EVERY 4 HOURS PRN
Status: DISCONTINUED | OUTPATIENT
Start: 2023-12-09 | End: 2023-12-12 | Stop reason: HOSPADM

## 2023-12-10 RX ADMIN — LIOTHYRONINE SODIUM 5 MCG: 5 TABLET ORAL at 09:10

## 2023-12-10 RX ADMIN — THIAMINE HYDROCHLORIDE 200 MG: 100 INJECTION, SOLUTION INTRAMUSCULAR; INTRAVENOUS at 14:10

## 2023-12-10 RX ADMIN — OXYBUTYNIN CHLORIDE 10 MG: 10 TABLET, EXTENDED RELEASE ORAL at 20:19

## 2023-12-10 RX ADMIN — MORPHINE SULFATE 1 MG: 2 INJECTION, SOLUTION INTRAMUSCULAR; INTRAVENOUS at 22:45

## 2023-12-10 RX ADMIN — SODIUM CHLORIDE 100 ML/HR: 9 INJECTION, SOLUTION INTRAVENOUS at 02:46

## 2023-12-10 RX ADMIN — SODIUM CHLORIDE 100 ML/HR: 9 INJECTION, SOLUTION INTRAVENOUS at 23:52

## 2023-12-10 RX ADMIN — POTASSIUM CHLORIDE 40 MEQ: 750 TABLET, EXTENDED RELEASE ORAL at 23:52

## 2023-12-10 RX ADMIN — PIPERACILLIN SODIUM AND TAZOBACTAM SODIUM 3.38 G: 3; .375 INJECTION, SOLUTION INTRAVENOUS at 16:32

## 2023-12-10 RX ADMIN — POTASSIUM CHLORIDE 40 MEQ: 750 TABLET, EXTENDED RELEASE ORAL at 20:17

## 2023-12-10 RX ADMIN — Medication 10 ML: at 21:48

## 2023-12-10 RX ADMIN — ONDANSETRON 4 MG: 2 INJECTION INTRAMUSCULAR; INTRAVENOUS at 04:10

## 2023-12-10 RX ADMIN — Medication 100 MG: at 09:08

## 2023-12-10 RX ADMIN — Medication 10 ML: at 02:47

## 2023-12-10 RX ADMIN — PIPERACILLIN SODIUM AND TAZOBACTAM SODIUM 3.38 G: 3; .375 INJECTION, SOLUTION INTRAVENOUS at 22:45

## 2023-12-10 RX ADMIN — Medication 1 TABLET: at 09:08

## 2023-12-10 RX ADMIN — POTASSIUM CHLORIDE 40 MEQ: 750 TABLET, EXTENDED RELEASE ORAL at 11:59

## 2023-12-10 RX ADMIN — THIAMINE HYDROCHLORIDE 200 MG: 100 INJECTION, SOLUTION INTRAMUSCULAR; INTRAVENOUS at 21:47

## 2023-12-10 RX ADMIN — ESTRADIOL 1 PATCH: 0.07 PATCH TRANSDERMAL at 09:10

## 2023-12-10 RX ADMIN — OXYBUTYNIN CHLORIDE 10 MG: 10 TABLET, EXTENDED RELEASE ORAL at 09:07

## 2023-12-10 RX ADMIN — FOLIC ACID 1 MG: 1 TABLET ORAL at 09:08

## 2023-12-10 RX ADMIN — TRAZODONE HYDROCHLORIDE 100 MG: 100 TABLET ORAL at 20:19

## 2023-12-10 RX ADMIN — HYDROCODONE BITARTRATE AND ACETAMINOPHEN 1 TABLET: 5; 325 TABLET ORAL at 16:35

## 2023-12-10 RX ADMIN — DULOXETINE HYDROCHLORIDE 60 MG: 60 CAPSULE, DELAYED RELEASE ORAL at 09:08

## 2023-12-10 RX ADMIN — HYDROCODONE BITARTRATE AND ACETAMINOPHEN 1 TABLET: 5; 325 TABLET ORAL at 09:11

## 2023-12-10 RX ADMIN — PANTOPRAZOLE SODIUM 40 MG: 40 TABLET, DELAYED RELEASE ORAL at 06:36

## 2023-12-10 RX ADMIN — PROGESTERONE 100 MG: 100 CAPSULE ORAL at 09:10

## 2023-12-10 RX ADMIN — SODIUM CHLORIDE 100 ML/HR: 9 INJECTION, SOLUTION INTRAVENOUS at 12:00

## 2023-12-10 RX ADMIN — MORPHINE SULFATE 1 MG: 2 INJECTION, SOLUTION INTRAMUSCULAR; INTRAVENOUS at 07:59

## 2023-12-10 RX ADMIN — MORPHINE SULFATE 1 MG: 2 INJECTION, SOLUTION INTRAMUSCULAR; INTRAVENOUS at 02:48

## 2023-12-10 RX ADMIN — ROSUVASTATIN CALCIUM 5 MG: 5 TABLET, FILM COATED ORAL at 09:08

## 2023-12-10 RX ADMIN — CARVEDILOL 12.5 MG: 12.5 TABLET, FILM COATED ORAL at 20:19

## 2023-12-10 RX ADMIN — CARVEDILOL 12.5 MG: 12.5 TABLET, FILM COATED ORAL at 09:08

## 2023-12-10 RX ADMIN — GADOBENATE DIMEGLUMINE 14 ML: 529 INJECTION, SOLUTION INTRAVENOUS at 19:25

## 2023-12-10 NOTE — PROGRESS NOTES
Name: Victoria H Sturgeon ADMIT: 2023   : 1955  PCP: Deana Wilcox MD    MRN: [id number] LOS: 0 days   AGE/SEX: 68 y.o. female  ROOM: [facility]     Subjective   Subjective   No new events. Feels ok and without diarrhea today, other than some mild cramping. Vaginal bleeding continues     Objective   Objective   Vital Signs  Temp:  [97 °F (36.1 °C)-98.6 °F (37 °C)] 98.6 °F (37 °C)  Heart Rate:  [73-90] 82  Resp:  [16] 16  BP: (116-159)/(62-92) 137/85  SpO2:  [96 %-99 %] 99 %  on   ;   Device (Oxygen Therapy): room air  Body mass index is 23.93 kg/m².  Physical Exam  Vitals reviewed.   Constitutional:       General: She is not in acute distress.  HENT:      Head: Normocephalic and atraumatic.      Mouth/Throat:      Mouth: Mucous membranes are moist.      Pharynx: No posterior oropharyngeal erythema.   Eyes:      General: No scleral icterus.  Neck:      Vascular: No JVD.   Cardiovascular:      Rate and Rhythm: Normal rate and regular rhythm.      Heart sounds: No murmur heard.  Pulmonary:      Effort: Pulmonary effort is normal. No respiratory distress.   Abdominal:      General: Bowel sounds are normal. There is no distension.      Palpations: Abdomen is soft.      Tenderness: There is abdominal tenderness (mild poorly localized).   Musculoskeletal:         General: No swelling or tenderness.      Cervical back: Neck supple.   Skin:     General: Skin is warm and dry.      Coloration: Skin is not jaundiced.      Findings: No rash.   Neurological:      Mental Status: She is alert and oriented to person, place, and time.   Psychiatric:         Mood and Affect: Mood normal.         Behavior: Behavior normal.       Results Review     I reviewed the patient's new clinical results.  Results from last 7 days   Lab Units 12/10/23  0759 12/10/23  0032 23  1815   WBC 10*3/mm3 8.57  --  9.31   HEMOGLOBIN g/dL 14.3 13.3 14.4   PLATELETS 10*3/mm3 244  --  232     Results from last 7 days   Lab Units  "12/10/23  0759 12/09/23  1900   SODIUM mmol/L 130* 129*   POTASSIUM mmol/L 3.1* 3.6   CHLORIDE mmol/L 96* 96*   CO2 mmol/L 19.9* 17.7*   BUN mg/dL 7* 9   CREATININE mg/dL 0.71 0.53*   GLUCOSE mg/dL 107* 104*   EGFR mL/min/1.73 92.7 100.9     Results from last 7 days   Lab Units 12/10/23  0759 12/09/23  1900   ALBUMIN g/dL 3.8 3.6   BILIRUBIN mg/dL 0.3 0.3   ALK PHOS U/L 199* 136*   AST (SGOT) U/L 98* 96*   ALT (SGPT) U/L 121* 117*     Results from last 7 days   Lab Units 12/10/23  0759 12/09/23  1900   CALCIUM mg/dL 8.5* 8.1*   ALBUMIN g/dL 3.8 3.6     Results from last 7 days   Lab Units 12/09/23  1900   PROCALCITONIN ng/mL 0.23     No results found for: \"HGBA1C\", \"POCGLU\"    US Pelvis Transvaginal Non OB    Result Date: 12/10/2023  1. Mild thickening of the endometrium in this postmenopausal patient. FINDINGS could be related to endometrial hyperplasia or endometrial carcinoma and further evaluation suggested. 2. No pelvic masses are seen. The ovaries are not seen.   This report was finalized on 12/10/2023 11:36 AM by Dr. Aquiles Perez M.D on Workstation: [id number]      CT Abdomen Pelvis With Contrast    Result Date: 12/9/2023   1. Full assessment of the colon is limited, as it is incompletely distended. However, images do suggest a diffusely thick-walled appearance, which may reflect colitis. Patient is noted to have a small amount of free fluid within the pelvis, likely reactive. 2. Increasing prominence of the common bile duct when compared to prior study from 2022. Correlation with liver function tests is recommended.  Radiation dose reduction techniques were utilized, including automated exposure control and exposure modulation based on body size.   This report was finalized on 12/9/2023 8:26 PM by Dr. Zeinab Oviedo M.D on Workstation: [id number]       I have personally reviewed all medications:  Scheduled Medications  budesonide-formoterol, 2 puff, Inhalation, BID - RT  carvedilol, 12.5 mg, Oral, " BID With Meals  DULoxetine, 60 mg, Oral, Daily  estradiol, 1 patch, Transdermal, Weekly  folic acid, 1 mg, Oral, Daily  liothyronine, 5 mcg, Oral, Daily  multivitamin with minerals, 1 tablet, Oral, Daily  oxybutynin XL, 10 mg, Oral, BID  pantoprazole, 40 mg, Oral, Q AM  piperacillin-tazobactam, 3.375 g, Intravenous, Once  piperacillin-tazobactam, 3.375 g, Intravenous, Q8H  potassium chloride ER, 40 mEq, Oral, Q4H  Progesterone, 100 mg, Oral, Daily  rosuvastatin, 5 mg, Oral, Daily  sodium chloride, 10 mL, Intravenous, Q12H  thiamine (B-1) IV, 200 mg, Intravenous, Q8H   Followed by  [START ON 12/15/2023] thiamine, 100 mg, Oral, Daily  traZODone, 100 mg, Oral, Nightly    Infusions  sodium chloride, 100 mL/hr, Last Rate: 100 mL/hr (12/10/23 1402)    Diet  NPO Diet NPO Type: Sips with Meds  NPO Diet NPO Type: Sips with Meds    I have personally reviewed:  [x]  Laboratory   [x]  Microbiology   [x]  Radiology   [x]  EKG/Telemetry  [x]  Cardiology/Vascular   []  Pathology    []  Records       Assessment/Plan     Active Hospital Problems    Diagnosis  POA    **Colitis [K52.9]  Yes    Vaginal bleeding [N93.9]  Yes    Abdominal pain [R10.9]  Yes    Diarrhea [R19.7]  Yes    Alcohol abuse [F10.10]  Yes    NICM (nonischemic cardiomyopathy) [I42.8]  Yes    Hypertension [I10]  Yes    Macrocytic anemia [D53.9]  Yes    Hyponatremia [E87.1]  Yes      Resolved Hospital Problems   No resolved problems to display.       68 y.o. female admitted with Colitis.    Vaginal bleeding: US with thickened endometrium. D/w GYN, planning hysteroscopy tomorrow.    Colitis seems to be resolving, no BM since last night. Will try to get stool sample to check for infection. May have had some rectal bleeding as well.   - GI following, planning scope at some point as outpt  - Not sure much role for abx but will continue Zosyn for now    Hepatitis: Possibly due to ETOH, but dilated bile ducts on CT. MRCP pending, follow labs    ETOH abuse: Patient says she  does not drink every day but family indicated may be more apparently. CIWA protocol monitored, lorazepam available prn. Added higher dose thiamine    Replace lytes per protocol. Na and acidosis improved    Prior cardiomyopathy but had resolved on most recent echo. Appears euvolemic, will cont home med regimen      SCDs for DVT prophylaxis.  Dispo: Likely Tues/Wed depending on course      Gold Boone MD  [facility]  12/10/23  15:20 EST
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
## 2023-12-10 NOTE — PLAN OF CARE
Goal Outcome Evaluation:  Plan of Care Reviewed With: patient        Progress: no change  Outcome Evaluation: US pelvis done this AM, patient c/o discomfort following transvaginal US and abdominal pain, Morphine & Norco given PRN. MRCP ordered for this afternoon, NPO except sips with meds. Awaiting stool sample, contact/spore precautions initiated for r/o CDiff. IVF infusing. K+ 3.1, replaced per protocol. CIWA protocol initiated, explained to patient, siderails padded. Plan for hysteroscopy tomorrow, consent signed. Up with assist. VSS.

## 2023-12-10 NOTE — ED NOTES
"Nursing report ED to floor  Victoria H Sturgeon  68 y.o.  female    HPI :   Chief Complaint   Patient presents with    Abdominal Pain       Admitting doctor:   Milton Boyce MD    Admitting diagnosis:   The primary encounter diagnosis was Colitis. A diagnosis of Lower abdominal pain was also pertinent to this visit.    Code status:   Current Code Status       Date Active Code Status Order ID Comments User Context       Prior            Allergies:   Cefdinir, Latex, Amoxicillin-pot clavulanate, and Eggs or egg-derived products    Isolation:   No active isolations    Intake and Output    Intake/Output Summary (Last 24 hours) at 12/9/2023 2252  Last data filed at 12/9/2023 2004  Gross per 24 hour   Intake 1000 ml   Output --   Net 1000 ml       Weight:       12/09/23 2126   Weight: 72.6 kg (160 lb)       Most recent vitals:   Vitals:    12/09/23 1831 12/09/23 1832 12/09/23 2126 12/09/23 2201   BP: 137/85   135/91   Pulse: 82 84  83   Resp:       Temp:       SpO2:  96%  96%   Weight:   72.6 kg (160 lb)    Height:   172.7 cm (68\")        Active LDAs/IV Access:   Lines, Drains & Airways       Active LDAs       Name Placement date Placement time Site Days    Peripheral IV 12/09/23 1811 Right Antecubital 12/09/23 1811  Antecubital  less than 1                    Labs (abnormal labs have a star):   Labs Reviewed   COMPREHENSIVE METABOLIC PANEL - Abnormal; Notable for the following components:       Result Value    Glucose 104 (*)     Creatinine 0.53 (*)     Sodium 129 (*)     Chloride 96 (*)     CO2 17.7 (*)     Calcium 8.1 (*)     ALT (SGPT) 117 (*)     AST (SGOT) 96 (*)     Alkaline Phosphatase 136 (*)     Anion Gap 15.3 (*)     All other components within normal limits    Narrative:     GFR Normal >60  Chronic Kidney Disease <60  Kidney Failure <15     URINALYSIS W/ MICROSCOPIC IF INDICATED (NO CULTURE) - Abnormal; Notable for the following components:    Appearance, UA Cloudy (*)     Glucose,  mg/dL (Trace) " (*)     Ketones, UA 40 mg/dL (2+) (*)     Blood, UA Large (3+) (*)     Protein, UA >=300 mg/dL (3+) (*)     All other components within normal limits   ETHANOL - Abnormal; Notable for the following components:    Ethanol 12 (*)     All other components within normal limits   URINE DRUG SCREEN - Abnormal; Notable for the following components:    Opiate Screen Positive (*)     All other components within normal limits    Narrative:     Negative Thresholds Per Drugs Screened:    Amphetamines                 500 ng/ml  Barbiturates                 200 ng/ml  Benzodiazepines              100 ng/ml  Cocaine                      300 ng/ml  Methadone                    300 ng/ml  Opiates                      300 ng/ml  Oxycodone                    100 ng/ml  THC                           50 ng/ml  Fentanyl                       5 ng/ml      The Normal Value for all drugs tested is negative. This report includes final unconfirmed screening results to be used for medical treatment purposes only. Unconfirmed results must not be used for non-medical purposes such as employment or legal testing. Clinical consideration should be applied to any drug of abuse test, particularly when unconfirmed results are used.           CBC WITH AUTO DIFFERENTIAL - Abnormal; Notable for the following components:    Neutrophil % 81.8 (*)     Lymphocyte % 10.5 (*)     Eosinophil % 0.1 (*)     Neutrophils, Absolute 7.61 (*)     All other components within normal limits   URINALYSIS, MICROSCOPIC ONLY - Abnormal; Notable for the following components:    RBC, UA 6-10 (*)     WBC, UA 3-5 (*)     All other components within normal limits   PROTIME-INR - Normal   APTT - Normal   LIPASE - Normal   ACETAMINOPHEN LEVEL - Normal   SALICYLATE LEVEL - Normal    Narrative:     Therapeutic range for Salicylates:  3.0 - 10.0 mg/dL for antipyretic/analgesic conditions  15.0 - 30.0 mg/dL for anti-inflammatory conditions   BLOOD CULTURE   BLOOD CULTURE   CBC AND  DIFFERENTIAL    Narrative:     The following orders were created for panel order CBC & Differential.  Procedure                               Abnormality         Status                     ---------                               -----------         ------                     CBC Auto Differential[[id number]]        Abnormal            Final result                 Please view results for these tests on the individual orders.       EKG:   No orders to display       Meds given in ED:   Medications   piperacillin-tazobactam (ZOSYN) 3.375 g in iso-osmotic dextrose 50 ml (premix) (has no administration in time range)   lactated ringers bolus 1,000 mL (0 mL Intravenous Stopped 12/9/23 2004)   ondansetron (ZOFRAN) injection 4 mg (4 mg Intravenous Given 12/9/23 1815)   Morphine sulfate (PF) injection 6 mg (6 mg Intravenous Given 12/9/23 1816)   iopamidol (ISOVUE-300) 61 % injection 85 mL (85 mL Intravenous Given 12/9/23 1954)   ondansetron (ZOFRAN) injection 4 mg (4 mg Intravenous Given 12/9/23 2120)   Morphine sulfate (PF) injection 6 mg (6 mg Intravenous Given 12/9/23 2120)       Imaging results:  CT Abdomen Pelvis With Contrast    Result Date: 12/9/2023   1. Full assessment of the colon is limited, as it is incompletely distended. However, images do suggest a diffusely thick-walled appearance, which may reflect colitis. Patient is noted to have a small amount of free fluid within the pelvis, likely reactive. 2. Increasing prominence of the common bile duct when compared to prior study from 2022. Correlation with liver function tests is recommended.  Radiation dose reduction techniques were utilized, including automated exposure control and exposure modulation based on body size.   This report was finalized on 12/9/2023 8:26 PM by Dr. Zeinab Oviedo M.D on Workstation: [id number]       Ambulatory status:   - stand by    Social issues:   Social History     Socioeconomic History    Marital status:      Spouse  name: CRISTINO    Number of children: 1   Tobacco Use    Smoking status: Never    Smokeless tobacco: Never   Substance and Sexual Activity    Alcohol use: Yes     Comment: no more than 2-3 drinks in a setting. hx of excessive alc use    Drug use: No    Sexual activity: Yes     Partners: Male     Comment: spouse = CRISTINO, with ED.       NIH Stroke Scale:       Yaquelin Sanches RN  12/09/23 22:52 EST

## 2023-12-10 NOTE — ED PROVIDER NOTES
EMERGENCY DEPARTMENT ENCOUNTER    Room Number:  16/16  PCP: Deana Wilcox MD  History obtained from: Patient      HPI:  Chief Complaint: Abdominal pain  A complete HPI/ROS/PMH/PSH/SH/FH are unobtainable due to: Not applicable  Context: Victoria H Sturgeon is a 68 y.o. female who presents to the ED c/o abdominal pain.  Lower, feels like prior episodes of diverticulitis.  Started several days ago, has been is having associated diarrhea.  Also having some bleeding from her vagina.  No fevers or chills.  Some decreased appetite and nausea.  Decreased p.o. intake over the last several days.            PAST MEDICAL HISTORY  Active Ambulatory Problems     Diagnosis Date Noted    Acute renal failure 05/03/2016    Diverticulitis of intestine 05/02/2016    Hypertension 04/06/2017    Hyponatremia 05/03/2016    Macrocytic anemia 05/03/2016    Metabolic acidosis 05/03/2016    Vulvitis 04/06/2017    Dyslipidemia 12/30/2019    NICM (nonischemic cardiomyopathy) 12/30/2019    Dense breast tissue on mammogram 01/07/2020    Lichen sclerosus of female genitalia 07/15/2020    Alcohol abuse 01/08/2021    Metabolic encephalopathy 01/08/2021    Dehydration 01/08/2021    COVID-19 06/08/2022    Pneumonia due to COVID-19 virus 06/08/2022    Lobar pneumonia 06/12/2022     Resolved Ambulatory Problems     Diagnosis Date Noted    No Resolved Ambulatory Problems     Past Medical History:   Diagnosis Date    Abnormal LFTs (liver function tests)     Abnormality on patient-activated cardiac event recorder     Acute pain of left foot     Aphthous ulcer of tongue     Cardiomyopathy 2008    Cervical radiculitis 11/16/2016    Chest discomfort     Chondromalacia patellae, 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/20/2017    Colon cancer screening     Contusion of 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/20/2017    Degeneration of cervical intervertebral disc     Degenerative joint disease 11/16/2016    Depression 07/22/2015    Encounter for long-term (current) use of high-risk medication      Encounter for screening colonoscopy     Folate deficiency     H/O cancer antigen 125 (CA-125) measurement     H/O Doppler ultrasound     History of alcohol abuse 2018    History of bone density study 2018    History of cardiomyopathy     History of Papanicolaou smear of cervix 2017    HPV test positive     Hyperlipidemia     Hypothyroidism     Internal hemorrhoid     Menopause     Neck pain     Normal coronary arteries     Osteopenia 2018    Pharyngeal abscess     Sinusitis     Spinal stenosis 2016    Sprain of sacroiliac joint 2017    Staphylococcal infection     Trigeminal neuralgia     Urge incontinence     Vitamin B 12 deficiency          PAST SURGICAL HISTORY  Past Surgical History:   Procedure Laterality Date    CATARACT EXTRACTION Bilateral 2016     SECTION      Baby Girl BERNA (Freshman @ [facility] )    CHOLECYSTECTOMY  1963    COLONOSCOPY  2014    Diverticulosis sigmoid, internal hemorrhoids    HERNIA REPAIR      Age 19    KNEE ARTHROSCOPY INCISION AND DRAINAGE OF KNEE Right  &     after fall    SINUS SURGERY      X 2 in  and in          FAMILY HISTORY  Family History   Problem Relation Age of Onset    Lupus Mother         Systemic Lupus Erythematosus (  in her sleep @ 73)    Leukemia Father         CLL Had it for 14 years.    Colon polyps Other         Family History    Colon cancer Other         Family History    Other Sister         breast lumpectomy    No Known Problems Brother     Scoliosis Daughter     Heart disease Maternal Grandmother     Anuerysm Maternal Grandmother 80    No Known Problems Paternal Grandmother         90's    Breast cancer Paternal Aunt 78    No Known Problems Maternal Grandfather     No Known Problems Paternal Grandfather         90's    No Known Problems Sister     Diabetes type II Brother         Diet controlled     BRCA 1/2 Neg Hx     Endometrial cancer Neg Hx     Ovarian cancer Neg Hx          SOCIAL  HISTORY  Social History     Socioeconomic History    Marital status:      Spouse name: CRISTINO    Number of children: 1   Tobacco Use    Smoking status: Never    Smokeless tobacco: Never   Substance and Sexual Activity    Alcohol use: Yes     Comment: no more than 2-3 drinks in a setting. hx of excessive alc use    Drug use: No    Sexual activity: Yes     Partners: Male     Comment: spouse = CRISTINO, with ED.         ALLERGIES  Cefdinir, Latex, Amoxicillin-pot clavulanate, and Eggs or egg-derived products        REVIEW OF SYSTEMS    As per HPI      PHYSICAL EXAM  ED Triage Vitals [12/09/23 1727]   Temp Heart Rate Resp BP SpO2   97.4 °F (36.3 °C) 90 16 138/62 99 %      Temp src Heart Rate Source Patient Position BP Location FiO2 (%)   -- -- -- -- --       Physical Exam  Constitutional:       General: She is not in acute distress.  HENT:      Head: Normocephalic and atraumatic.   Cardiovascular:      Rate and Rhythm: Normal rate and regular rhythm.   Pulmonary:      Effort: Pulmonary effort is normal. No respiratory distress.   Abdominal:      General: There is no distension.      Palpations: Abdomen is soft.      Tenderness: There is abdominal tenderness.      Comments: Tenderness to palpation in the suprapubic region without rebound or guarding   Musculoskeletal:         General: No swelling or deformity.   Skin:     General: Skin is warm and dry.   Neurological:      Mental Status: She is alert. Mental status is at baseline.           Vital signs and nursing notes reviewed.          LAB RESULTS  Recent Results (from the past 24 hour(s))   Protime-INR    Collection Time: 12/09/23  6:15 PM    Specimen: Blood   Result Value Ref Range    Protime 13.4 11.7 - 14.2 Seconds    INR 1.01 0.90 - 1.10   aPTT    Collection Time: 12/09/23  6:15 PM    Specimen: Blood   Result Value Ref Range    PTT 27.4 22.7 - 35.4 seconds   Lipase    Collection Time: 12/09/23  6:15 PM    Specimen: Blood   Result Value Ref Range    Lipase 30 13 -  60 U/L   Urinalysis With Microscopic If Indicated (No Culture) - Urine, Clean Catch    Collection Time: 12/09/23  6:15 PM    Specimen: Urine, Clean Catch   Result Value Ref Range    Color, UA Yellow Yellow, Straw    Appearance, UA Cloudy (A) Clear    pH, UA 6.5 5.0 - 8.0    Specific Gravity, UA 1.027 1.005 - 1.030    Glucose,  mg/dL (Trace) (A) Negative    Ketones, UA 40 mg/dL (2+) (A) Negative    Bilirubin, UA Negative Negative    Blood, UA Large (3+) (A) Negative    Protein, UA >=300 mg/dL (3+) (A) Negative    Leuk Esterase, UA Negative Negative    Nitrite, UA Negative Negative    Urobilinogen, UA 1.0 E.U./dL 0.2 - 1.0 E.U./dL   Acetaminophen Level    Collection Time: 12/09/23  6:15 PM    Specimen: Blood   Result Value Ref Range    Acetaminophen <5.0 0.0 - 30.0 mcg/mL   Urine Drug Screen - Urine, Clean Catch    Collection Time: 12/09/23  6:15 PM    Specimen: Urine, Clean Catch   Result Value Ref Range    Amphet/Methamphet, Screen Negative Negative    Barbiturates Screen, Urine Negative Negative    Benzodiazepine Screen, Urine Negative Negative    Cocaine Screen, Urine Negative Negative    Opiate Screen Positive (A) Negative    THC, Screen, Urine Negative Negative    Methadone Screen, Urine Negative Negative    Oxycodone Screen, Urine Negative Negative    Fentanyl, Urine Negative Negative   Salicylate Level    Collection Time: 12/09/23  6:15 PM    Specimen: Blood   Result Value Ref Range    Salicylate <0.3 <=30.0 mg/dL   CBC Auto Differential    Collection Time: 12/09/23  6:15 PM    Specimen: Blood   Result Value Ref Range    WBC 9.31 3.40 - 10.80 10*3/mm3    RBC 4.75 3.77 - 5.28 10*6/mm3    Hemoglobin 14.4 12.0 - 15.9 g/dL    Hematocrit 44.2 34.0 - 46.6 %    MCV 93.1 79.0 - 97.0 fL    MCH 30.3 26.6 - 33.0 pg    MCHC 32.6 31.5 - 35.7 g/dL    RDW 13.3 12.3 - 15.4 %    RDW-SD 45.2 37.0 - 54.0 fl    MPV 10.2 6.0 - 12.0 fL    Platelets 232 140 - 450 10*3/mm3    Neutrophil % 81.8 (H) 42.7 - 76.0 %    Lymphocyte %  10.5 (L) 19.6 - 45.3 %    Monocyte % 6.9 5.0 - 12.0 %    Eosinophil % 0.1 (L) 0.3 - 6.2 %    Basophil % 0.3 0.0 - 1.5 %    Immature Grans % 0.4 0.0 - 0.5 %    Neutrophils, Absolute 7.61 (H) 1.70 - 7.00 10*3/mm3    Lymphocytes, Absolute 0.98 0.70 - 3.10 10*3/mm3    Monocytes, Absolute 0.64 0.10 - 0.90 10*3/mm3    Eosinophils, Absolute 0.01 0.00 - 0.40 10*3/mm3    Basophils, Absolute 0.03 0.00 - 0.20 10*3/mm3    Immature Grans, Absolute 0.04 0.00 - 0.05 10*3/mm3    nRBC 0.0 0.0 - 0.2 /100 WBC   Urinalysis, Microscopic Only - Urine, Clean Catch    Collection Time: 12/09/23  6:15 PM    Specimen: Urine, Clean Catch   Result Value Ref Range    RBC, UA 6-10 (A) None Seen, 0-2 /HPF    WBC, UA 3-5 (A) None Seen, 0-2 /HPF    Bacteria, UA None Seen None Seen /HPF    Squamous Epithelial Cells, UA 0-2 None Seen, 0-2 /HPF    Hyaline Casts, UA 3-6 None Seen /LPF    Methodology Manual Light Microscopy    Comprehensive Metabolic Panel    Collection Time: 12/09/23  7:00 PM    Specimen: Blood   Result Value Ref Range    Glucose 104 (H) 65 - 99 mg/dL    BUN 9 8 - 23 mg/dL    Creatinine 0.53 (L) 0.57 - 1.00 mg/dL    Sodium 129 (L) 136 - 145 mmol/L    Potassium 3.6 3.5 - 5.2 mmol/L    Chloride 96 (L) 98 - 107 mmol/L    CO2 17.7 (L) 22.0 - 29.0 mmol/L    Calcium 8.1 (L) 8.6 - 10.5 mg/dL    Total Protein 6.2 6.0 - 8.5 g/dL    Albumin 3.6 3.5 - 5.2 g/dL    ALT (SGPT) 117 (H) 1 - 33 U/L    AST (SGOT) 96 (H) 1 - 32 U/L    Alkaline Phosphatase 136 (H) 39 - 117 U/L    Total Bilirubin 0.3 0.0 - 1.2 mg/dL    Globulin 2.6 gm/dL    A/G Ratio 1.4 g/dL    BUN/Creatinine Ratio 17.0 7.0 - 25.0    Anion Gap 15.3 (H) 5.0 - 15.0 mmol/L    eGFR 100.9 >60.0 mL/min/1.73   Ethanol    Collection Time: 12/09/23  7:00 PM    Specimen: Blood   Result Value Ref Range    Ethanol 12 (H) 0 - 10 mg/dL    Ethanol % 0.012 %       Ordered the above labs and reviewed the results.        RADIOLOGY  CT Abdomen Pelvis With Contrast    Result Date: 12/9/2023  CT OF THE ABDOMEN  PELVIS WITH CONTRAST  HISTORY: Left lower quadrant pain  COMPARISON: None available.  TECHNIQUE: Axial CT imaging was obtained through the abdomen and pelvis. IV contrast was administered.  FINDINGS: Images through the lung bases demonstrate some bibasilar scarring versus atelectasis. No suspicious hepatic lesions are seen. The gallbladder is absent. There is mild intrahepatic biliary dilatation, as well as some dilatation of the common bile duct, measuring up to 9 mm distally. The common bile duct does appear more prominent than on prior imaging from June 2022. Correlation with liver function tests is recommended. There is no pancreatic ductal dilatation. A few calcified granulomata are noted within the spleen. Stomach, duodenum, and adrenal glands are normal. The kidneys enhance symmetrically. There is no hydronephrosis. No distal ureteral or bladder stones are seen. There are some aortoiliac calcifications. Uterus appears normal, as is the appendix. There is colonic diverticulosis. Full assessment of the colon is limited due to incomplete distention, although there may be some diffuse wall thickening. Colitis is not excluded. There is also a trace amount of free fluid noted within the pelvis. There is no evidence of small bowel obstruction. No pneumatosis or free air is seen. Compression deformity involving the inferior endplate of T12 is chronic.        1. Full assessment of the colon is limited, as it is incompletely distended. However, images do suggest a diffusely thick-walled appearance, which may reflect colitis. Patient is noted to have a small amount of free fluid within the pelvis, likely reactive. 2. Increasing prominence of the common bile duct when compared to prior study from 2022. Correlation with liver function tests is recommended.  Radiation dose reduction techniques were utilized, including automated exposure control and exposure modulation based on body size.   This report was finalized on  12/9/2023 8:26 PM by Dr. Zeinab Oviedo M.D on Workstation: [id number]       Ordered the above noted radiological studies. Reviewed by me in PACS.                MEDICATIONS GIVEN IN ER  Medications   piperacillin-tazobactam (ZOSYN) 3.375 g in iso-osmotic dextrose 50 ml (premix) (3.375 g Intravenous New Bag 12/9/23 2322)   lactated ringers bolus 1,000 mL (0 mL Intravenous Stopped 12/9/23 2004)   ondansetron (ZOFRAN) injection 4 mg (4 mg Intravenous Given 12/9/23 1815)   Morphine sulfate (PF) injection 6 mg (6 mg Intravenous Given 12/9/23 1816)   iopamidol (ISOVUE-300) 61 % injection 85 mL (85 mL Intravenous Given 12/9/23 1954)   ondansetron (ZOFRAN) injection 4 mg (4 mg Intravenous Given 12/9/23 2120)   Morphine sulfate (PF) injection 6 mg (6 mg Intravenous Given 12/9/23 2120)               MEDICAL DECISION MAKING, PROGRESS, and CONSULTS    MDM: Patient presented emergency department with lower abdominal pain, diarrhea.  Otherwise well-appearing, vitals otherwise stable.  Labs significant for mild hyponatremia, metabolic acidosis.  Low alcohol level, suspect metabolic acidosis likely secondary to metabolism.  Imaging demonstrating wall thickening of the colon likely representing colitis.  Fits with patient's symptoms.  Given dose of Zosyn.  Discussed with inpatient team, will admit for observation and further management.    All labs have been independently reviewed by me.  All radiology studies have been reviewed by me and I have also reviewed the radiology report.   EKG's independently viewed and interpreted by me.  Discussion below represents my analysis of pertinent findings related to patient's condition, differential diagnosis, treatment plan and final disposition.      Additional sources:  - Discussed/ obtained information from independent historians:      - External (non-ED) record review: Patient has a history of alcohol use disorder    - Chronic or social conditions impacting care: Alcohol use     -  Shared decision making:  Discussed plan for admission, patient agrees.      Orders placed during this visit:  Orders Placed This Encounter   Procedures    Blood Culture - Blood,    Blood Culture - Blood,    CT Abdomen Pelvis With Contrast    Comprehensive Metabolic Panel    Protime-INR    aPTT    Lipase    Urinalysis With Microscopic If Indicated (No Culture) - Urine, Clean Catch    Acetaminophen Level    Ethanol    Urine Drug Screen - Urine, Clean Catch    Salicylate Level    CBC Auto Differential    Urinalysis, Microscopic Only - Urine, Clean Catch    [facility] (on-call MD unless specified) Details    Inpatient Obstetrics / Gynecology Consult    Inpatient Gastroenterology Consult    Initiate Observation Status    CBC & Differential         Additional orders considered but not ordered:  Considered additional toxicologic testing however will defer at this time.        Differential diagnosis includes but is not limited to:    Abdominal pain, diverticulitis, colitis, gastroenteritis, urinary tract infection      Independent interpretation of labs, radiology studies, and discussions with consultants:  ED Course as of 12/09/23 2350   Sat Dec 09, 2023   1735 Heart Rate: 90 [FS]   1846 Blood, UA(!): Large (3+) [FS]   1853 WBC, UA(!): 3-5 [FS]   1853 RBC, UA(!): 6-10 [FS]   1934 Ethanol(!): 12 [FS]   1934 Sodium(!): 129 [FS]   1934 CO2(!): 17.7 [FS]   1934 ALT (SGPT)(!): 117 [FS]   1934 AST (SGOT)(!): 96 [FS]   1934 Alkaline Phosphatase(!): 136 [FS]   1956 CT abdomen pelvis interpreted myself:  Mild inflammatory change around the sigmoid colon suggestive of colitis [FS]   1958 Neutrophil Rel %(!): 81.8 [FS]      ED Course User Index  [FS] Davis Pulido MD           DIAGNOSIS  Final diagnoses:   Colitis   Lower abdominal pain         DISPOSITION  Admitted to [facility]        Latest Documented Vital Signs:  As of 23:50 EST  BP- 139/86 HR- 74 Temp- 97.4 °F (36.3 °C) O2 sat- 97%              --    Please note that portions of this  were completed with a voice recognition program.       Note Disclaimer: At [facility], we believe that sharing information builds trust and better relationships. You are receiving this note because you are receiving care at [facility] or recently visited. It is possible you will see health information before a provider has talked with you about it. This kind of information can be easy to misunderstand. To help you fully understand what it means for your health, we urge you to discuss this note with your provider.             Davis Pulido MD  12/09/23 0460
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
## 2023-12-10 NOTE — PLAN OF CARE
Goal Outcome Evaluation:  Plan of Care Reviewed With: patient        Progress: no change  Outcome Evaluation: admitted with colitis and vaginal bleeding, med for pain, ob and gi consults, ob came and did vaginal exam, mri screening form given to pt, marlo, yonisg, verbalizing about 's death a few months ago

## 2023-12-10 NOTE — PROGRESS NOTES
Victoria H Sturgeon  1955    I saw the patient in addition to Dr. Larose.  She notes that the bleeding started on Thursday and is bright red and sometimes heavy.  Will at times ability pad.  She does not have any current anemia.  She has continued on hormone replacement therapy, I had recommended discontinuing in the past as she does not have significant complaint of hot flashes, night sweats, and she has risk factors for adverse events with hrt    I reviewed her sonogram images myself and her endometrium is a bit thickened and heterogenous.  She may have an endometrial polyp.  I recommended either endometrial biopsy or proceeding with hysteroscopy, D&C with Myosure to fully sample the endometrial lining and remove a polyp if present.  She much prefers hysteroscopy, D&C over endometrial biopsy in the office.  I also think this is warranted as her bleeding is a bit heavy.  Plan to proceed with this tomorrow.    Zeynep Phan MD  12/10/2023  10:48 EST
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
## 2023-12-11 ENCOUNTER — ANESTHESIA (OUTPATIENT)
Dept: PERIOP | Facility: HOSPITAL | Age: 68
End: 2023-12-11
Payer: MEDICARE

## 2023-12-11 ENCOUNTER — ANESTHESIA EVENT (OUTPATIENT)
Dept: PERIOP | Facility: HOSPITAL | Age: 68
End: 2023-12-11
Payer: MEDICARE

## 2023-12-11 PROBLEM — A49.8 CLOSTRIDIOIDES DIFFICILE INFECTION: Status: ACTIVE | Noted: 2023-12-11

## 2023-12-11 LAB
ADV 40+41 DNA STL QL NAA+NON-PROBE: NOT DETECTED
ALBUMIN SERPL-MCNC: 3.3 G/DL (ref 3.5–5.2)
ALBUMIN/GLOB SERPL: 1.7 G/DL
ALP SERPL-CCNC: 154 U/L (ref 39–117)
ALT SERPL W P-5'-P-CCNC: 80 U/L (ref 1–33)
ANION GAP SERPL CALCULATED.3IONS-SCNC: 7 MMOL/L (ref 5–15)
AST SERPL-CCNC: 56 U/L (ref 1–32)
ASTRO TYP 1-8 RNA STL QL NAA+NON-PROBE: NOT DETECTED
BILIRUB SERPL-MCNC: 0.3 MG/DL (ref 0–1.2)
BUN SERPL-MCNC: 8 MG/DL (ref 8–23)
BUN/CREAT SERPL: 13.1 (ref 7–25)
C CAYETANENSIS DNA STL QL NAA+NON-PROBE: NOT DETECTED
C COLI+JEJ+UPSA DNA STL QL NAA+NON-PROBE: NOT DETECTED
C DIFF GDH + TOXINS A+B STL QL IA.RAPID: NEGATIVE
C DIFF TOX GENS STL QL NAA+PROBE: POSITIVE
CALCIUM SPEC-SCNC: 8.2 MG/DL (ref 8.6–10.5)
CHLORIDE SERPL-SCNC: 110 MMOL/L (ref 98–107)
CO2 SERPL-SCNC: 19 MMOL/L (ref 22–29)
CREAT SERPL-MCNC: 0.61 MG/DL (ref 0.57–1)
CRYPTOSP DNA STL QL NAA+NON-PROBE: NOT DETECTED
DEPRECATED RDW RBC AUTO: 46.8 FL (ref 37–54)
E HISTOLYT DNA STL QL NAA+NON-PROBE: NOT DETECTED
EAEC PAA PLAS AGGR+AATA ST NAA+NON-PRB: NOT DETECTED
EC STX1+STX2 GENES STL QL NAA+NON-PROBE: NOT DETECTED
EGFRCR SERPLBLD CKD-EPI 2021: 97.5 ML/MIN/1.73
EPEC EAE GENE STL QL NAA+NON-PROBE: DETECTED
ERYTHROCYTE [DISTWIDTH] IN BLOOD BY AUTOMATED COUNT: 13.8 % (ref 12.3–15.4)
ETEC LTA+ST1A+ST1B TOX ST NAA+NON-PROBE: NOT DETECTED
G LAMBLIA DNA STL QL NAA+NON-PROBE: NOT DETECTED
GLOBULIN UR ELPH-MCNC: 2 GM/DL
GLUCOSE SERPL-MCNC: 116 MG/DL (ref 65–99)
HCT VFR BLD AUTO: 34.6 % (ref 34–46.6)
HGB BLD-MCNC: 11.9 G/DL (ref 12–15.9)
MAGNESIUM SERPL-MCNC: 2 MG/DL (ref 1.6–2.4)
MCH RBC QN AUTO: 31.9 PG (ref 26.6–33)
MCHC RBC AUTO-ENTMCNC: 34.4 G/DL (ref 31.5–35.7)
MCV RBC AUTO: 92.8 FL (ref 79–97)
NOROVIRUS GI+II RNA STL QL NAA+NON-PROBE: NOT DETECTED
P SHIGELLOIDES DNA STL QL NAA+NON-PROBE: NOT DETECTED
PLATELET # BLD AUTO: 194 10*3/MM3 (ref 140–450)
PMV BLD AUTO: 10.2 FL (ref 6–12)
POTASSIUM SERPL-SCNC: 4.3 MMOL/L (ref 3.5–5.2)
PROT SERPL-MCNC: 5.3 G/DL (ref 6–8.5)
RBC # BLD AUTO: 3.73 10*6/MM3 (ref 3.77–5.28)
RVA RNA STL QL NAA+NON-PROBE: NOT DETECTED
S ENT+BONG DNA STL QL NAA+NON-PROBE: NOT DETECTED
SAPO I+II+IV+V RNA STL QL NAA+NON-PROBE: NOT DETECTED
SHIGELLA SP+EIEC IPAH ST NAA+NON-PROBE: NOT DETECTED
SODIUM SERPL-SCNC: 136 MMOL/L (ref 136–145)
V CHOL+PARA+VUL DNA STL QL NAA+NON-PROBE: NOT DETECTED
V CHOLERAE DNA STL QL NAA+NON-PROBE: NOT DETECTED
WBC NRBC COR # BLD AUTO: 6.46 10*3/MM3 (ref 3.4–10.8)
Y ENTEROCOL DNA STL QL NAA+NON-PROBE: NOT DETECTED

## 2023-12-11 PROCEDURE — 58558 HYSTEROSCOPY BIOPSY: CPT | Performed by: OBSTETRICS & GYNECOLOGY

## 2023-12-11 PROCEDURE — 25010000002 THIAMINE HCL 200 MG/2ML SOLUTION: Performed by: HOSPITALIST

## 2023-12-11 PROCEDURE — 25010000002 KETOROLAC TROMETHAMINE PER 15 MG: Performed by: NURSE ANESTHETIST, CERTIFIED REGISTERED

## 2023-12-11 PROCEDURE — 25010000002 DEXAMETHASONE SODIUM PHOSPHATE 20 MG/5ML SOLUTION: Performed by: ANESTHESIOLOGY

## 2023-12-11 PROCEDURE — 85027 COMPLETE CBC AUTOMATED: CPT | Performed by: HOSPITALIST

## 2023-12-11 PROCEDURE — 25010000002 MIDAZOLAM PER 1 MG: Performed by: ANESTHESIOLOGY

## 2023-12-11 PROCEDURE — 88305 TISSUE EXAM BY PATHOLOGIST: CPT | Performed by: OBSTETRICS & GYNECOLOGY

## 2023-12-11 PROCEDURE — 25810000003 LACTATED RINGERS PER 1000 ML: Performed by: ANESTHESIOLOGY

## 2023-12-11 PROCEDURE — S0260 H&P FOR SURGERY: HCPCS | Performed by: OBSTETRICS & GYNECOLOGY

## 2023-12-11 PROCEDURE — 0UB98ZZ EXCISION OF UTERUS, VIA NATURAL OR ARTIFICIAL OPENING ENDOSCOPIC: ICD-10-PCS | Performed by: OBSTETRICS & GYNECOLOGY

## 2023-12-11 PROCEDURE — 80053 COMPREHEN METABOLIC PANEL: CPT | Performed by: HOSPITALIST

## 2023-12-11 PROCEDURE — 25010000002 FENTANYL CITRATE (PF) 50 MCG/ML SOLUTION: Performed by: ANESTHESIOLOGY

## 2023-12-11 PROCEDURE — 83735 ASSAY OF MAGNESIUM: CPT | Performed by: HOSPITALIST

## 2023-12-11 PROCEDURE — 25010000002 PIPERACILLIN SOD-TAZOBACTAM PER 1 G: Performed by: OBSTETRICS & GYNECOLOGY

## 2023-12-11 PROCEDURE — 25010000002 ONDANSETRON PER 1 MG: Performed by: NURSE ANESTHETIST, CERTIFIED REGISTERED

## 2023-12-11 PROCEDURE — 99232 SBSQ HOSP IP/OBS MODERATE 35: CPT | Performed by: PHYSICIAN ASSISTANT

## 2023-12-11 PROCEDURE — C1782 MORCELLATOR: HCPCS | Performed by: OBSTETRICS & GYNECOLOGY

## 2023-12-11 PROCEDURE — 25010000002 PIPERACILLIN SOD-TAZOBACTAM PER 1 G: Performed by: HOSPITALIST

## 2023-12-11 PROCEDURE — 25810000003 SODIUM CHLORIDE 0.9 % SOLUTION: Performed by: OBSTETRICS & GYNECOLOGY

## 2023-12-11 PROCEDURE — 25010000002 HYDROMORPHONE PER 4 MG: Performed by: ANESTHESIOLOGY

## 2023-12-11 PROCEDURE — 25010000002 MORPHINE PER 10 MG: Performed by: OBSTETRICS & GYNECOLOGY

## 2023-12-11 PROCEDURE — 25010000002 THIAMINE HCL 200 MG/2ML SOLUTION: Performed by: OBSTETRICS & GYNECOLOGY

## 2023-12-11 PROCEDURE — 25010000002 PROPOFOL 200 MG/20ML EMULSION: Performed by: ANESTHESIOLOGY

## 2023-12-11 PROCEDURE — 0UDB8ZZ EXTRACTION OF ENDOMETRIUM, VIA NATURAL OR ARTIFICIAL OPENING ENDOSCOPIC: ICD-10-PCS | Performed by: OBSTETRICS & GYNECOLOGY

## 2023-12-11 RX ORDER — SODIUM CHLORIDE, SODIUM LACTATE, POTASSIUM CHLORIDE, CALCIUM CHLORIDE 600; 310; 30; 20 MG/100ML; MG/100ML; MG/100ML; MG/100ML
9 INJECTION, SOLUTION INTRAVENOUS CONTINUOUS
Status: DISCONTINUED | OUTPATIENT
Start: 2023-12-11 | End: 2023-12-11

## 2023-12-11 RX ORDER — MAGNESIUM HYDROXIDE 1200 MG/15ML
LIQUID ORAL AS NEEDED
Status: DISCONTINUED | OUTPATIENT
Start: 2023-12-11 | End: 2023-12-11 | Stop reason: HOSPADM

## 2023-12-11 RX ORDER — IPRATROPIUM BROMIDE AND ALBUTEROL SULFATE 2.5; .5 MG/3ML; MG/3ML
3 SOLUTION RESPIRATORY (INHALATION) ONCE AS NEEDED
Status: DISCONTINUED | OUTPATIENT
Start: 2023-12-11 | End: 2023-12-11 | Stop reason: HOSPADM

## 2023-12-11 RX ORDER — ONDANSETRON 2 MG/ML
INJECTION INTRAMUSCULAR; INTRAVENOUS AS NEEDED
Status: DISCONTINUED | OUTPATIENT
Start: 2023-12-11 | End: 2023-12-11 | Stop reason: SURG

## 2023-12-11 RX ORDER — ACETAMINOPHEN 500 MG
1000 TABLET ORAL ONCE
Status: COMPLETED | OUTPATIENT
Start: 2023-12-11 | End: 2023-12-11

## 2023-12-11 RX ORDER — FAMOTIDINE 10 MG/ML
20 INJECTION, SOLUTION INTRAVENOUS ONCE
Status: COMPLETED | OUTPATIENT
Start: 2023-12-11 | End: 2023-12-11

## 2023-12-11 RX ORDER — HYDROMORPHONE HYDROCHLORIDE 1 MG/ML
0.5 INJECTION, SOLUTION INTRAMUSCULAR; INTRAVENOUS; SUBCUTANEOUS
Status: DISCONTINUED | OUTPATIENT
Start: 2023-12-11 | End: 2023-12-11 | Stop reason: HOSPADM

## 2023-12-11 RX ORDER — LIDOCAINE HYDROCHLORIDE 20 MG/ML
INJECTION, SOLUTION INFILTRATION; PERINEURAL AS NEEDED
Status: DISCONTINUED | OUTPATIENT
Start: 2023-12-11 | End: 2023-12-11 | Stop reason: SURG

## 2023-12-11 RX ORDER — FLUMAZENIL 0.1 MG/ML
0.2 INJECTION INTRAVENOUS AS NEEDED
Status: DISCONTINUED | OUTPATIENT
Start: 2023-12-11 | End: 2023-12-11 | Stop reason: HOSPADM

## 2023-12-11 RX ORDER — HYDRALAZINE HYDROCHLORIDE 20 MG/ML
5 INJECTION INTRAMUSCULAR; INTRAVENOUS
Status: DISCONTINUED | OUTPATIENT
Start: 2023-12-11 | End: 2023-12-11 | Stop reason: HOSPADM

## 2023-12-11 RX ORDER — DEXAMETHASONE SODIUM PHOSPHATE 4 MG/ML
INJECTION, SOLUTION INTRA-ARTICULAR; INTRALESIONAL; INTRAMUSCULAR; INTRAVENOUS; SOFT TISSUE AS NEEDED
Status: DISCONTINUED | OUTPATIENT
Start: 2023-12-11 | End: 2023-12-11 | Stop reason: SURG

## 2023-12-11 RX ORDER — PROMETHAZINE HYDROCHLORIDE 25 MG/1
25 TABLET ORAL ONCE AS NEEDED
Status: DISCONTINUED | OUTPATIENT
Start: 2023-12-11 | End: 2023-12-11 | Stop reason: HOSPADM

## 2023-12-11 RX ORDER — SODIUM CHLORIDE, SODIUM LACTATE, POTASSIUM CHLORIDE, CALCIUM CHLORIDE 600; 310; 30; 20 MG/100ML; MG/100ML; MG/100ML; MG/100ML
INJECTION, SOLUTION INTRAVENOUS CONTINUOUS PRN
Status: DISCONTINUED | OUTPATIENT
Start: 2023-12-11 | End: 2023-12-11 | Stop reason: SURG

## 2023-12-11 RX ORDER — LIDOCAINE HYDROCHLORIDE 10 MG/ML
0.5 INJECTION, SOLUTION INFILTRATION; PERINEURAL ONCE AS NEEDED
Status: DISCONTINUED | OUTPATIENT
Start: 2023-12-11 | End: 2023-12-11 | Stop reason: HOSPADM

## 2023-12-11 RX ORDER — SODIUM CHLORIDE 0.9 % (FLUSH) 0.9 %
3-10 SYRINGE (ML) INJECTION AS NEEDED
Status: DISCONTINUED | OUTPATIENT
Start: 2023-12-11 | End: 2023-12-11 | Stop reason: HOSPADM

## 2023-12-11 RX ORDER — KETOROLAC TROMETHAMINE 30 MG/ML
INJECTION, SOLUTION INTRAMUSCULAR; INTRAVENOUS AS NEEDED
Status: DISCONTINUED | OUTPATIENT
Start: 2023-12-11 | End: 2023-12-11 | Stop reason: SURG

## 2023-12-11 RX ORDER — FAMOTIDINE 20 MG/1
20 TABLET, FILM COATED ORAL DAILY
Status: DISCONTINUED | OUTPATIENT
Start: 2023-12-11 | End: 2023-12-12 | Stop reason: HOSPADM

## 2023-12-11 RX ORDER — PROPOFOL 10 MG/ML
INJECTION, EMULSION INTRAVENOUS AS NEEDED
Status: DISCONTINUED | OUTPATIENT
Start: 2023-12-11 | End: 2023-12-11 | Stop reason: SURG

## 2023-12-11 RX ORDER — EPHEDRINE SULFATE 50 MG/ML
5 INJECTION, SOLUTION INTRAVENOUS ONCE AS NEEDED
Status: DISCONTINUED | OUTPATIENT
Start: 2023-12-11 | End: 2023-12-11 | Stop reason: HOSPADM

## 2023-12-11 RX ORDER — DIPHENHYDRAMINE HYDROCHLORIDE 50 MG/ML
12.5 INJECTION INTRAMUSCULAR; INTRAVENOUS
Status: DISCONTINUED | OUTPATIENT
Start: 2023-12-11 | End: 2023-12-11 | Stop reason: HOSPADM

## 2023-12-11 RX ORDER — ONDANSETRON 2 MG/ML
4 INJECTION INTRAMUSCULAR; INTRAVENOUS ONCE AS NEEDED
Status: DISCONTINUED | OUTPATIENT
Start: 2023-12-11 | End: 2023-12-11 | Stop reason: HOSPADM

## 2023-12-11 RX ORDER — OXYCODONE AND ACETAMINOPHEN 7.5; 325 MG/1; MG/1
1 TABLET ORAL EVERY 4 HOURS PRN
Status: DISCONTINUED | OUTPATIENT
Start: 2023-12-11 | End: 2023-12-11 | Stop reason: HOSPADM

## 2023-12-11 RX ORDER — PHENAZOPYRIDINE HYDROCHLORIDE 100 MG/1
100 TABLET, FILM COATED ORAL 3 TIMES DAILY PRN
Status: DISCONTINUED | OUTPATIENT
Start: 2023-12-11 | End: 2023-12-12 | Stop reason: HOSPADM

## 2023-12-11 RX ORDER — PROMETHAZINE HYDROCHLORIDE 25 MG/1
25 SUPPOSITORY RECTAL ONCE AS NEEDED
Status: DISCONTINUED | OUTPATIENT
Start: 2023-12-11 | End: 2023-12-11 | Stop reason: HOSPADM

## 2023-12-11 RX ORDER — DEXMEDETOMIDINE HYDROCHLORIDE 100 UG/ML
INJECTION, SOLUTION INTRAVENOUS AS NEEDED
Status: DISCONTINUED | OUTPATIENT
Start: 2023-12-11 | End: 2023-12-11 | Stop reason: SURG

## 2023-12-11 RX ORDER — DROPERIDOL 2.5 MG/ML
0.62 INJECTION, SOLUTION INTRAMUSCULAR; INTRAVENOUS
Status: DISCONTINUED | OUTPATIENT
Start: 2023-12-11 | End: 2023-12-11 | Stop reason: HOSPADM

## 2023-12-11 RX ORDER — MIDAZOLAM HYDROCHLORIDE 1 MG/ML
1 INJECTION INTRAMUSCULAR; INTRAVENOUS
Status: DISCONTINUED | OUTPATIENT
Start: 2023-12-11 | End: 2023-12-11 | Stop reason: HOSPADM

## 2023-12-11 RX ORDER — NALOXONE HCL 0.4 MG/ML
0.2 VIAL (ML) INJECTION AS NEEDED
Status: DISCONTINUED | OUTPATIENT
Start: 2023-12-11 | End: 2023-12-11 | Stop reason: HOSPADM

## 2023-12-11 RX ORDER — LOPERAMIDE HYDROCHLORIDE 2 MG/1
2 CAPSULE ORAL 4 TIMES DAILY PRN
Status: DISCONTINUED | OUTPATIENT
Start: 2023-12-11 | End: 2023-12-12 | Stop reason: HOSPADM

## 2023-12-11 RX ORDER — SODIUM CHLORIDE 0.9 % (FLUSH) 0.9 %
3 SYRINGE (ML) INJECTION EVERY 12 HOURS SCHEDULED
Status: DISCONTINUED | OUTPATIENT
Start: 2023-12-11 | End: 2023-12-11 | Stop reason: HOSPADM

## 2023-12-11 RX ORDER — HYDROCODONE BITARTRATE AND ACETAMINOPHEN 5; 325 MG/1; MG/1
1 TABLET ORAL ONCE AS NEEDED
Status: COMPLETED | OUTPATIENT
Start: 2023-12-11 | End: 2023-12-11

## 2023-12-11 RX ORDER — LABETALOL HYDROCHLORIDE 5 MG/ML
5 INJECTION, SOLUTION INTRAVENOUS
Status: DISCONTINUED | OUTPATIENT
Start: 2023-12-11 | End: 2023-12-11 | Stop reason: HOSPADM

## 2023-12-11 RX ORDER — FENTANYL CITRATE 50 UG/ML
50 INJECTION, SOLUTION INTRAMUSCULAR; INTRAVENOUS
Status: DISCONTINUED | OUTPATIENT
Start: 2023-12-11 | End: 2023-12-11 | Stop reason: HOSPADM

## 2023-12-11 RX ORDER — VANCOMYCIN HYDROCHLORIDE 125 MG/1
125 CAPSULE ORAL EVERY 6 HOURS SCHEDULED
Qty: 40 CAPSULE | Refills: 0 | Status: DISCONTINUED | OUTPATIENT
Start: 2023-12-11 | End: 2023-12-11

## 2023-12-11 RX ADMIN — ONDANSETRON 4 MG: 2 INJECTION INTRAMUSCULAR; INTRAVENOUS at 10:39

## 2023-12-11 RX ADMIN — LIDOCAINE HYDROCHLORIDE 100 MG: 20 INJECTION, SOLUTION INFILTRATION; PERINEURAL at 10:18

## 2023-12-11 RX ADMIN — HYDROCODONE BITARTRATE AND ACETAMINOPHEN 1 TABLET: 5; 325 TABLET ORAL at 11:11

## 2023-12-11 RX ADMIN — TRAZODONE HYDROCHLORIDE 100 MG: 100 TABLET ORAL at 20:11

## 2023-12-11 RX ADMIN — PROPOFOL 150 MG: 10 INJECTION, EMULSION INTRAVENOUS at 10:18

## 2023-12-11 RX ADMIN — FAMOTIDINE 20 MG: 10 INJECTION INTRAVENOUS at 10:07

## 2023-12-11 RX ADMIN — FENTANYL CITRATE 50 MCG: 50 INJECTION, SOLUTION INTRAMUSCULAR; INTRAVENOUS at 11:52

## 2023-12-11 RX ADMIN — DULOXETINE HYDROCHLORIDE 60 MG: 60 CAPSULE, DELAYED RELEASE ORAL at 08:54

## 2023-12-11 RX ADMIN — VANCOMYCIN HYDROCHLORIDE 125 MG: 125 CAPSULE ORAL at 06:25

## 2023-12-11 RX ADMIN — SODIUM CHLORIDE, POTASSIUM CHLORIDE, SODIUM LACTATE AND CALCIUM CHLORIDE 9 ML/HR: 600; 310; 30; 20 INJECTION, SOLUTION INTRAVENOUS at 10:07

## 2023-12-11 RX ADMIN — LIOTHYRONINE SODIUM 5 MCG: 5 TABLET ORAL at 08:54

## 2023-12-11 RX ADMIN — PIPERACILLIN SODIUM AND TAZOBACTAM SODIUM 3.38 G: 3; .375 INJECTION, SOLUTION INTRAVENOUS at 06:25

## 2023-12-11 RX ADMIN — SODIUM CHLORIDE, POTASSIUM CHLORIDE, SODIUM LACTATE AND CALCIUM CHLORIDE: 600; 310; 30; 20 INJECTION, SOLUTION INTRAVENOUS at 10:12

## 2023-12-11 RX ADMIN — FENTANYL CITRATE 50 MCG: 50 INJECTION, SOLUTION INTRAMUSCULAR; INTRAVENOUS at 11:09

## 2023-12-11 RX ADMIN — PIPERACILLIN SODIUM AND TAZOBACTAM SODIUM 3.38 G: 3; .375 INJECTION, SOLUTION INTRAVENOUS at 14:20

## 2023-12-11 RX ADMIN — Medication 10 ML: at 20:11

## 2023-12-11 RX ADMIN — MORPHINE SULFATE 1 MG: 2 INJECTION, SOLUTION INTRAMUSCULAR; INTRAVENOUS at 20:11

## 2023-12-11 RX ADMIN — HYDROMORPHONE HYDROCHLORIDE 0.5 MG: 1 INJECTION, SOLUTION INTRAMUSCULAR; INTRAVENOUS; SUBCUTANEOUS at 11:25

## 2023-12-11 RX ADMIN — PHENAZOPYRIDINE 100 MG: 100 TABLET ORAL at 18:52

## 2023-12-11 RX ADMIN — PROGESTERONE 100 MG: 100 CAPSULE ORAL at 08:54

## 2023-12-11 RX ADMIN — HYDROCODONE BITARTRATE AND ACETAMINOPHEN 1 TABLET: 5; 325 TABLET ORAL at 06:45

## 2023-12-11 RX ADMIN — SODIUM CHLORIDE 100 ML/HR: 9 INJECTION, SOLUTION INTRAVENOUS at 23:01

## 2023-12-11 RX ADMIN — ACETAMINOPHEN 1000 MG: 500 TABLET ORAL at 10:07

## 2023-12-11 RX ADMIN — OXYBUTYNIN CHLORIDE 10 MG: 10 TABLET, EXTENDED RELEASE ORAL at 08:54

## 2023-12-11 RX ADMIN — Medication 10 ML: at 08:57

## 2023-12-11 RX ADMIN — MIDAZOLAM HYDROCHLORIDE 1 MG: 2 INJECTION, SOLUTION INTRAMUSCULAR; INTRAVENOUS at 10:07

## 2023-12-11 RX ADMIN — DEXAMETHASONE SODIUM PHOSPHATE 8 MG: 4 INJECTION, SOLUTION INTRAMUSCULAR; INTRAVENOUS at 10:23

## 2023-12-11 RX ADMIN — THIAMINE HYDROCHLORIDE 200 MG: 100 INJECTION, SOLUTION INTRAMUSCULAR; INTRAVENOUS at 21:59

## 2023-12-11 RX ADMIN — HYDROCODONE BITARTRATE AND ACETAMINOPHEN 1 TABLET: 5; 325 TABLET ORAL at 16:01

## 2023-12-11 RX ADMIN — KETOROLAC TROMETHAMINE 30 MG: 30 INJECTION, SOLUTION INTRAMUSCULAR; INTRAVENOUS at 10:39

## 2023-12-11 RX ADMIN — VANCOMYCIN HYDROCHLORIDE 125 MG: 125 CAPSULE ORAL at 12:39

## 2023-12-11 RX ADMIN — THIAMINE HYDROCHLORIDE 200 MG: 100 INJECTION, SOLUTION INTRAMUSCULAR; INTRAVENOUS at 06:25

## 2023-12-11 RX ADMIN — HYDROMORPHONE HYDROCHLORIDE 0.5 MG: 1 INJECTION, SOLUTION INTRAMUSCULAR; INTRAVENOUS; SUBCUTANEOUS at 11:39

## 2023-12-11 RX ADMIN — DEXMEDETOMIDINE HCL 10 MCG: 100 INJECTION INTRAVENOUS at 10:36

## 2023-12-11 RX ADMIN — VANCOMYCIN HYDROCHLORIDE 125 MG: 125 CAPSULE ORAL at 02:41

## 2023-12-11 RX ADMIN — CARVEDILOL 12.5 MG: 12.5 TABLET, FILM COATED ORAL at 08:53

## 2023-12-11 RX ADMIN — OXYBUTYNIN CHLORIDE 10 MG: 10 TABLET, EXTENDED RELEASE ORAL at 20:11

## 2023-12-11 RX ADMIN — THIAMINE HYDROCHLORIDE 200 MG: 100 INJECTION, SOLUTION INTRAMUSCULAR; INTRAVENOUS at 14:21

## 2023-12-11 RX ADMIN — LOPERAMIDE HYDROCHLORIDE 2 MG: 2 CAPSULE ORAL at 18:52

## 2023-12-11 RX ADMIN — CARVEDILOL 12.5 MG: 12.5 TABLET, FILM COATED ORAL at 18:52

## 2023-12-11 NOTE — PROGRESS NOTES
[facility]  Inpatient Progress Note    Reason for Follow Up:  diarrhea, elevated LFTs    Subjective     Interval History:   Pt reports she is doing okay. She had several watery BMs yesterday after having dinner, but none overnight or this morning.  No rectal bleeding.  She denies significant abd cramping at this time.   She states she was drinking excessively for a few days prior to admission.   Hysteroscopy planned w/ Gyn today.       Current Facility-Administered Medications:     [MAR Hold] acetaminophen (TYLENOL) tablet 650 mg, 650 mg, Oral, Q4H PRN **OR** [MAR Hold] acetaminophen (TYLENOL) 160 MG/5ML oral solution 650 mg, 650 mg, Oral, Q4H PRN **OR** [MAR Hold] acetaminophen (TYLENOL) suppository 650 mg, 650 mg, Rectal, Q4H PRN, Elizabeth Cabrera, JURGEN    [MAR Hold] budesonide-formoterol (SYMBICORT) 160-4.5 MCG/ACT inhaler 2 puff, 2 puff, Inhalation, BID - RT, Milton Boyce MD    [MAR Hold] Calcium Replacement - Follow Nurse / BPA Driven Protocol, , Does not apply, PRN, Gold Boone MD    carvedilol (COREG) tablet 12.5 mg, 12.5 mg, Oral, BID With Meals, Milton Boyce MD, 12.5 mg at 12/11/23 0853    diphenhydrAMINE (BENADRYL) injection 12.5 mg, 12.5 mg, Intravenous, Q15 Min PRN, Luan Leon MD    droperidol (INAPSINE) injection 0.625 mg, 0.625 mg, Intravenous, Q20 Min PRN **OR** droperidol (INAPSINE) injection 0.625 mg, 0.625 mg, Intramuscular, Q20 Min PRN, Luan Leon MD    [MAR Hold] DULoxetine (CYMBALTA) DR capsule 60 mg, 60 mg, Oral, Daily, Milton Boyce MD, 60 mg at 12/11/23 0854    ePHEDrine injection 5 mg, 5 mg, Intravenous, Once PRN, Luan Leon MD    [MAR Hold] estradiol (CLIMARA) 0.075 MG/24HR patch 1 patch, 1 patch, Transdermal, Weekly, Milton Boyce MD, 1 patch at 12/10/23 0910    famotidine (PEPCID) tablet 20 mg, 20 mg, Oral, Daily, Juju Hardy APRN    fentaNYL citrate (PF) (SUBLIMAZE) injection 50 mcg, 50 mcg,  Intravenous, Q5 Min PRN, Luan Leon MD, 50 mcg at 12/11/23 1109    flumazenil (ROMAZICON) injection 0.2 mg, 0.2 mg, Intravenous, PRN, Luan Leon MD    [MAR Hold] folic acid (FOLVITE) tablet 1 mg, 1 mg, Oral, Daily, Milton Boyce MD, 1 mg at 12/10/23 0908    hydrALAZINE (APRESOLINE) injection 5 mg, 5 mg, Intravenous, Q10 Min PRN, Luan Leon MD    [MAR Hold] HYDROcodone-acetaminophen (NORCO) 5-325 MG per tablet 1 tablet, 1 tablet, Oral, Q6H PRN, Milton Boyce MD, 1 tablet at 12/11/23 0645    HYDROmorphone (DILAUDID) injection 0.5 mg, 0.5 mg, Intravenous, Q5 Min PRN, Luan Leon MD    ipratropium-albuterol (DUO-NEB) nebulizer solution 3 mL, 3 mL, Nebulization, Once PRN, Luan Leon MD    labetalol (NORMODYNE,TRANDATE) injection 5 mg, 5 mg, Intravenous, Q5 Min PRN, Luan Leon MD    lactated ringers infusion, 9 mL/hr, Intravenous, Continuous, Luan Leon MD, Last Rate: 9 mL/hr at 12/11/23 1007, 9 mL/hr at 12/11/23 1007    [MAR Hold] liothyronine (CYTOMEL) tablet 5 mcg, 5 mcg, Oral, Daily, Milton Boyce MD, 5 mcg at 12/11/23 0854    [MAR Hold] LORazepam (ATIVAN) tablet 1 mg, 1 mg, Oral, Q1H PRN **OR** [MAR Hold] midazolam (VERSED) injection 2 mg, 2 mg, Intravenous, Q1H PRN **OR** [MAR Hold] LORazepam (ATIVAN) tablet 2 mg, 2 mg, Oral, Q1H PRN **OR** [MAR Hold] midazolam (VERSED) injection 4 mg, 4 mg, Intravenous, Q1H PRN **OR** [MAR Hold] midazolam (VERSED) injection 4 mg, 4 mg, Intravenous, Q15 Min PRN **OR** [MAR Hold] midazolam (VERSED) injection 4 mg, 4 mg, Intramuscular, Q15 Min PRN, Gold Boone MD    [MAR Hold] Magnesium Standard Dose Replacement - Follow Nurse / BPA Driven Protocol, , Does not apply, PRN, Gold Boone MD    [MAR Hold] morphine injection 1 mg, 1 mg, Intravenous, Q4H PRN, 1 mg at 12/10/23 2245 **AND** [MAR Hold] naloxone (NARCAN) injection 0.4 mg, 0.4 mg, Intravenous, Q5 Min PRN, Elizabeth Cabrera APRN    [MAR Hold] multivitamin  with minerals 1 tablet, 1 tablet, Oral, Daily, Milton Boyce MD, 1 tablet at 12/10/23 0908    naloxone (NARCAN) injection 0.2 mg, 0.2 mg, Intravenous, PRN, Luan Leon MD    [MAR Hold] ondansetron (ZOFRAN) injection 4 mg, 4 mg, Intravenous, Q6H PRN, Elizabeth Cabrera APRN, 4 mg at 12/10/23 0410    ondansetron (ZOFRAN) injection 4 mg, 4 mg, Intravenous, Once PRN, Luan Leon MD    [MAR Hold] oxybutynin XL (DITROPAN-XL) 24 hr tablet 10 mg, 10 mg, Oral, BID, Milton Boyce MD, 10 mg at 12/11/23 0854    oxyCODONE-acetaminophen (PERCOCET) 7.5-325 MG per tablet 1 tablet, 1 tablet, Oral, Q4H PRN, Luan Leon MD    Pharmacy Consult, , Does not apply, Continuous PRN, Juju Hardy APRN    [MAR Hold] Phosphorus Replacement - Follow Nurse / BPA Driven Protocol, , Does not apply, PRN, Gold Boone MD    piperacillin-tazobactam (ZOSYN) 3.375 g in iso-osmotic dextrose 50 ml (premix), 3.375 g, Intravenous, Q8H, Gold Boone MD, 3.375 g at 12/11/23 0625    Potassium Replacement - Follow Nurse / BPA Driven Protocol, , Does not apply, PRN, Gold Boone MD    [MAR Hold] Progesterone (PROMETRIUM) capsule 100 mg, 100 mg, Oral, Daily, Milton Boyce MD, 100 mg at 12/11/23 0854    promethazine (PHENERGAN) suppository 25 mg, 25 mg, Rectal, Once PRN **OR** promethazine (PHENERGAN) tablet 25 mg, 25 mg, Oral, Once PRN, Luan Leon MD    [MAR Hold] rosuvastatin (CRESTOR) tablet 5 mg, 5 mg, Oral, Daily, Milton Boyce MD, 5 mg at 12/10/23 0908    [MAR Hold] sodium chloride 0.9 % flush 10 mL, 10 mL, Intravenous, Q12H, Elizabeth Cabrera APRN, 10 mL at 12/11/23 0857    [MAR Hold] sodium chloride 0.9 % flush 10 mL, 10 mL, Intravenous, PRN, Elizabeth Cabrera APRN    [MAR Hold] sodium chloride 0.9 % infusion 40 mL, 40 mL, Intravenous, PRN, Elizabeth Cabrera APRN    sodium chloride 0.9 % infusion, 100 mL/hr, Intravenous, Continuous, Elizabeth Cabrera, JURGEN,  Last Rate: 100 mL/hr at 12/10/23 2352, 100 mL/hr at 12/10/23 2352    [MAR Hold] thiamine (B-1) injection 200 mg, 200 mg, Intravenous, Q8H, 200 mg at 12/11/23 0625 **FOLLOWED BY** [MAR Hold] thiamine (VITAMIN B-1) tablet 100 mg, 100 mg, Oral, Daily, Gold Boone MD    [MAR Hold] traZODone (DESYREL) tablet 100 mg, 100 mg, Oral, Nightly, Elizabeth Cabrera APRN, 100 mg at 12/10/23 2019    vancomycin (VANCOCIN) capsule 125 mg, 125 mg, Oral, Q6H, Juju Hardy APRN, 125 mg at 12/11/23 0625        Objective     Vital Signs  Temp:  [97.6 °F (36.4 °C)-98.1 °F (36.7 °C)] 97.6 °F (36.4 °C)  Heart Rate:  [57-73] 60  Resp:  [15-16] 15  BP: (119-140)/() 125/75  Body mass index is 23.93 kg/m².    Intake/Output Summary (Last 24 hours) at 12/11/2023 1115  Last data filed at 12/11/2023 0625  Gross per 24 hour   Intake 2283 ml   Output --   Net 2283 ml     No intake/output data recorded.     Physical Exam:   General: patient awake, alert and cooperative   Eyes: Normal lids and lashes, no scleral icterus   Abdomen: soft, nontender, nondistended; normal bowel sounds      Results Review:     I reviewed the patient's new clinical results.    Results from last 7 days   Lab Units 12/11/23  0354 12/10/23  0759 12/10/23  0032 12/09/23  1815   WBC 10*3/mm3 6.46 8.57  --  9.31   HEMOGLOBIN g/dL 11.9* 14.3 13.3 14.4   HEMATOCRIT % 34.6 40.3 40.0 44.2   PLATELETS 10*3/mm3 194 244  --  232     Results from last 7 days   Lab Units 12/11/23  0354 12/10/23  0759 12/09/23  1900   SODIUM mmol/L 136 130* 129*   POTASSIUM mmol/L 4.3 3.1* 3.6   CHLORIDE mmol/L 110* 96* 96*   CO2 mmol/L 19.0* 19.9* 17.7*   BUN mg/dL 8 7* 9   CREATININE mg/dL 0.61 0.71 0.53*   CALCIUM mg/dL 8.2* 8.5* 8.1*   BILIRUBIN mg/dL 0.3 0.3 0.3   ALK PHOS U/L 154* 199* 136*   ALT (SGPT) U/L 80* 121* 117*   AST (SGOT) U/L 56* 98* 96*   GLUCOSE mg/dL 116* 107* 104*     Results from last 7 days   Lab Units 12/09/23  1815   INR  1.01     Lab Results   Lab  Value Date/Time    LIPASE 30 12/09/2023 1815       Radiology:  MRI abdomen w wo contrast mrcp         US Pelvis Transvaginal Non OB   Final Result   1. Mild thickening of the endometrium in this postmenopausal patient.   FINDINGS could be related to endometrial hyperplasia or endometrial   carcinoma and further evaluation suggested.   2. No pelvic masses are seen. The ovaries are not seen.           This report was finalized on 12/10/2023 11:36 AM by Dr. Aquiles Perez M.D on Workstation: [id number]          CT Abdomen Pelvis With Contrast   Final Result       1. Full assessment of the colon is limited, as it is incompletely   distended. However, images do suggest a diffusely thick-walled   appearance, which may reflect colitis. Patient is noted to have a small   amount of free fluid within the pelvis, likely reactive.   2. Increasing prominence of the common bile duct when compared to prior   study from 2022. Correlation with liver function tests is recommended.       Radiation dose reduction techniques were utilized, including automated   exposure control and exposure modulation based on body size.           This report was finalized on 12/9/2023 8:26 PM by Dr. Zeinab Oviedo M.D on Workstation: [id number]              Assessment & Plan     Active Hospital Problems    Diagnosis     **Colitis     Clostridioides difficile infection     Vaginal bleeding     Abdominal pain     Diarrhea     PMB (postmenopausal bleeding)     Thickened endometrium     Alcohol abuse     NICM (nonischemic cardiomyopathy)     Hypertension     Macrocytic anemia     Hyponatremia          Assessment:   Abdominal pain and diarrhea- Positive Enteropathogenic E.coli   CAT scan with likely colitis  Increased prominence of the bile duct on CAT scan  MRCP/MRI w/o CBD dilation, choledocholithiasis, cancer/mass. Mild fatty liver.   History of diverticulitis in the distant past  Elevated liver tests- improving; question if due to recent ETOH  binge   Vaginal bleeding    Plan:   No indication for ERCP at this time given negative MRCP.  Continue to trend LFTs. Discussed ETOH abstinence w/ patient   Pt w/ C.diff PCR +, but toxin Negative. She has a hx of c.diff earlier this year. Suspect colonization rather than true infection.  GI path PCR Enteropathogenic E.coli +. Continue supportive care. Can d/c antibiotics.     I discussed the patients findings and my recommendations with patient.         Cali Blanco PA-C  [facility], [place], operated by [facility]  [address] [place] [address]  Office: [phone]

## 2023-12-11 NOTE — ANESTHESIA PROCEDURE NOTES
Airway  Urgency: elective    Date/Time: 12/11/2023 10:21 AM  Airway not difficult    General Information and Staff    Patient location during procedure: OR    Indications and Patient Condition  Indications for airway management: airway protection    Preoxygenated: yes  Mask difficulty assessment: 0 - not attempted    Final Airway Details  Final airway type: supraglottic airway      Successful airway: unique  Size 3     Number of attempts at approach: 1  Assessment: lips, teeth, and gum same as pre-op and atraumatic intubation
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
## 2023-12-11 NOTE — PLAN OF CARE
Goal Outcome Evaluation:  Plan of Care Reviewed With: patient        Progress: no change  Outcome Evaluation: Pt admitted with diarrhea/colitis to r/o c-diff. After MRCP, pt ate some solid soft bland food and had moderate amt of watery brown stool. Stool specimen sent to lab. Result was + for enteropathogenic ecoli and + for c-diff. Result called to Juju SEAMAN. Pt started on po vancomycin. She also continues on zosyn. Afebrile and VS stable. . Continues in contact spore isolation. No s/s of withdrawal on CIWA scale.

## 2023-12-11 NOTE — OP NOTE
GYN Operative Note  Subjective     Date of Service:  12/11/23  Time of Service:  10:41 EST    Surgical Staff: Surgeon(s) and Role:     * Zeynep Phan MD - Primary   Pre-operative diagnosis(es): Pre-Op Diagnosis Codes:     * PMB (postmenopausal bleeding) [N95.0]     * Thickened endometrium [R93.89]     Post-operative diagnosis(es): Post-Op Diagnosis Codes:     * PMB (postmenopausal bleeding) [N95.0]     * Thickened endometrium [R93.89]   Procedure(s): Procedure(s):  DILATATION AND CURETTAGE HYSTEROSCOPY WITH MYOSURE     Antibiotics: none     Anesthesia: Type: General  ASA:  II     Objective      Operative findings: There was a small polyp near the left tubal ostia and mild thickened of some tissue in the area but otherwise the endometrium appeared normal.   Operative Note: After informed consent was obtained, the patient was brought to the operating room.     General anesthesia was administered.  She was positioned in lithotomy with legs in candycane stirrups.  The vagina and vulva was prepped and she was draped in sterile fashion.  The bladder was emptied.  A timeout was performed and confirmed the correct patient and procedure    Sterile speculum was used to visualize the cervix which was grasped with single-tooth tenaculum.  The cervix was dilated to a maximum of 16 Spanish.  The MyoSure hysteroscope was introduced with findings as above.  The MyoSure device was introduced and the polyp was removed in its entirety.  The general curettage was then also performed with the MyoSure.    Hysteroscope was removed.  General curettage with a #1 curette was also performed.  The tenaculum was removed and the sites were hemostatic.    Patient was brought to PACU in stable condition.     Specimens removed: ID Type Source Tests Collected by Time   A (Not marked as sent) : endometrial curettings Tissue Endometrial Curettings TISSUE PATHOLOGY EXAM Zeynep Phan MD 12/11/2023 1037      Fluid Intake: 500 mL   Output:  Documented Output  Est. Blood Loss 5 mL  Urine Output 150 mL      No intake/output data recorded.     Blood products used: No   Drains: * No LDAs found *   Implant Information: Nothing was implanted during the procedure   Complications: None   Condition: stable   Disposition: to PACU and then back to the floor           Zeynep Phan MD  12/11/23  10:41 EST

## 2023-12-11 NOTE — ADDENDUM NOTE
Addendum  created 12/11/23 1421 by Elizabeth Pennington MD    Attestation recorded in Intraprocedure, Intraprocedure Attestations filed

## 2023-12-11 NOTE — PROGRESS NOTES
Name: Victoria H Sturgeon ADMIT: 2023   : 1955  PCP: Deana Wilcox MD    MRN: [id number] LOS: 1 days   AGE/SEX: 68 y.o. female  ROOM: [facility]     Subjective   Subjective   Patient appears comfortable and in no apparent distress following D&C today on 2023.  Reports large diarrheal bowel movement overnight.  Requesting assistance to bathroom now to defecate.       Objective   Objective   Vital Signs  Temp:  [97.4 °F (36.3 °C)-98.1 °F (36.7 °C)] 98 °F (36.7 °C)  Heart Rate:  [57-73] 71  Resp:  [15-16] 16  BP: (111-140)/() 121/74  SpO2:  [94 %-100 %] 94 %  on  Flow (L/min):  [2] 2;   Device (Oxygen Therapy): room air  Body mass index is 23.93 kg/m².    Physical Exam  Constitutional:       General: She is not in acute distress.     Appearance: She is not toxic-appearing.   Cardiovascular:      Rate and Rhythm: Normal rate.      Heart sounds: Normal heart sounds.   Pulmonary:      Effort: Pulmonary effort is normal.      Breath sounds: Normal breath sounds.   Abdominal:      General: Bowel sounds are normal.      Palpations: Abdomen is soft.   Musculoskeletal:      Right lower leg: No edema.      Left lower leg: No edema.   Skin:     General: Skin is warm and dry.   Neurological:      Mental Status: She is alert.       Results Review     I reviewed the patient's new clinical results.  Results from last 7 days   Lab Units 23  0354 12/10/23  0759 12/10/23  0032 23  1815   WBC 10*3/mm3 6.46 8.57  --  9.31   HEMOGLOBIN g/dL 11.9* 14.3 13.3 14.4   PLATELETS 10*3/mm3 194 244  --  232     Results from last 7 days   Lab Units 23  0354 12/10/23  0759 23  1900   SODIUM mmol/L 136 130* 129*   POTASSIUM mmol/L 4.3 3.1* 3.6   CHLORIDE mmol/L 110* 96* 96*   CO2 mmol/L 19.0* 19.9* 17.7*   BUN mg/dL 8 7* 9   CREATININE mg/dL 0.61 0.71 0.53*   GLUCOSE mg/dL 116* 107* 104*   EGFR mL/min/1.73 97.5 92.7 100.9     Results from last 7 days   Lab Units 23  0354 12/10/23  0759  "12/09/23  1900   ALBUMIN g/dL 3.3* 3.8 3.6   BILIRUBIN mg/dL 0.3 0.3 0.3   ALK PHOS U/L 154* 199* 136*   AST (SGOT) U/L 56* 98* 96*   ALT (SGPT) U/L 80* 121* 117*     Results from last 7 days   Lab Units 12/11/23  0354 12/10/23  0759 12/09/23  1900   CALCIUM mg/dL 8.2* 8.5* 8.1*   ALBUMIN g/dL 3.3* 3.8 3.6   MAGNESIUM mg/dL 2.0  --   --      Results from last 7 days   Lab Units 12/09/23  1900   PROCALCITONIN ng/mL 0.23     No results found for: \"HGBA1C\", \"POCGLU\"    US Pelvis Transvaginal Non OB    Result Date: 12/10/2023  1. Mild thickening of the endometrium in this postmenopausal patient. FINDINGS could be related to endometrial hyperplasia or endometrial carcinoma and further evaluation suggested. 2. No pelvic masses are seen. The ovaries are not seen.   This report was finalized on 12/10/2023 11:36 AM by Dr. Aquiles Perez M.D on Workstation: [id number]      CT Abdomen Pelvis With Contrast    Result Date: 12/9/2023   1. Full assessment of the colon is limited, as it is incompletely distended. However, images do suggest a diffusely thick-walled appearance, which may reflect colitis. Patient is noted to have a small amount of free fluid within the pelvis, likely reactive. 2. Increasing prominence of the common bile duct when compared to prior study from 2022. Correlation with liver function tests is recommended.  Radiation dose reduction techniques were utilized, including automated exposure control and exposure modulation based on body size.   This report was finalized on 12/9/2023 8:26 PM by Dr. Zeinab Oviedo M.D on Workstation: [id number]       I have personally reviewed all medications:  Scheduled Medications  budesonide-formoterol, 2 puff, Inhalation, BID - RT  carvedilol, 12.5 mg, Oral, BID With Meals  DULoxetine, 60 mg, Oral, Daily  estradiol, 1 patch, Transdermal, Weekly  famotidine, 20 mg, Oral, Daily  folic acid, 1 mg, Oral, Daily  liothyronine, 5 mcg, Oral, Daily  multivitamin with minerals, 1 " tablet, Oral, Daily  oxybutynin XL, 10 mg, Oral, BID  Progesterone, 100 mg, Oral, Daily  rosuvastatin, 5 mg, Oral, Daily  sodium chloride, 10 mL, Intravenous, Q12H  thiamine (B-1) IV, 200 mg, Intravenous, Q8H   Followed by  [START ON 12/15/2023] thiamine, 100 mg, Oral, Daily  traZODone, 100 mg, Oral, Nightly    Infusions  Pharmacy Consult,   sodium chloride, 100 mL/hr, Last Rate: 100 mL/hr (12/10/23 2352)    Diet  Diet: Gastrointestinal Diets; Fiber-Restricted, Low Irritant; Texture: Regular Texture (IDDSI 7); Fluid Consistency: Thin (IDDSI 0)    I have personally reviewed:  [x]  Laboratory   [x]  Microbiology   [x]  Radiology   [x]  EKG/Telemetry  [x]  Cardiology/Vascular   []  Pathology    []  Records       Assessment/Plan     Active Hospital Problems    Diagnosis  POA    **Colitis [K52.9]  Yes    Clostridioides difficile infection [A49.8]  Yes    Vaginal bleeding [N93.9]  Yes    Abdominal pain [R10.9]  Yes    Diarrhea [R19.7]  Yes    PMB (postmenopausal bleeding) [N95.0]  Yes    Thickened endometrium [R93.89]  Yes    Alcohol abuse [F10.10]  Yes    NICM (nonischemic cardiomyopathy) [I42.8]  Yes    Hypertension [I10]  Yes    Macrocytic anemia [D53.9]  Yes    Hyponatremia [E87.1]  Yes      Resolved Hospital Problems   No resolved problems to display.       68 y.o. female admitted with Colitis.    Vaginal bleeding: US with thickened endometrium. GYN following s/p DILATATION AND CURETTAGE HYSTEROSCOPY WITH MYOSURE on 12/11/2023     Colitis appears to be resolving with one BM recorded daily.  GI PCR growing EPEC with symptom management and C. difficile positive with Ag negative in the setting of history of C. difficile earlier this year.  Suspect colonization rather than true infection.  Gastroenterology following as well recommend discontinue antibiotic--Zosyn--request permission from gynecology regarding de-escalation Zosyn for perioperative antibiotic therapy who assures antibiotic not necessary from GYN standpoint.   Follow-up for biopsy results with GYN after discharge.  - GI following, planning scope at some point as outpatient  - Ordering anti-diarrheal as needed     Hepatitis: Possibly due to ETOH.  MRCP negative.  LFTs improving.     ETOH abuse: Patient says she does not drink every day but previous family discussion indicated likely more intake than admitted. CIWA protocol available & score zero per RN, lorazepam available prn. Continue higher dose thiamine     Electrolytes replaced per protocol. Labs in a.m.     Prior cardiomyopathy but had resolved on most recent echo. Appears euvolemic, will cont home med regimen      SCDs for DVT prophylaxis.  Full code.  Discussed with patient and nursing staff.  Anticipate discharge home hopefully tomorrow pending reduced BM, appropriate labs, & clinical presentation      JURGEN Whaley  [facility]  12/11/23  15:56 EST

## 2023-12-11 NOTE — PROGRESS NOTES
Continued Stay Note  [facility]     Patient Name: Victoria H Sturgeon  MRN: [id number]  Today's Date: 12/11/2023    Admit Date: 12/9/2023    Plan: Home no needs   Discharge Plan       Row Name 12/11/23 1557       Plan    Plan Home no needs    Plan Comments Introduced self and role of CCP. Patient confirmed DC plan is to return to home. Stated she is independent with ADL's and uses no DMEs. Stated her family will assist as needed and will provide transportation at DC. Denies any needs/equipment.                   Discharge Codes    No documentation.                 Expected Discharge Date and Time       Expected Discharge Date Expected Discharge Time    Dec 13, 2023               Cinthya Alexander, RN

## 2023-12-11 NOTE — ANESTHESIA PREPROCEDURE EVALUATION
Anesthesia Evaluation     Patient summary reviewed and Nursing notes reviewed   NPO Solid Status: > 6 hours  NPO Liquid Status: > 2 hours           Airway   Mallampati: II  TM distance: >3 FB  Neck ROM: full  Small opening  Comment: Cervical disc disease, takes norco PRN. Full ROM on exam.   Dental          Pulmonary    (-) COPD, asthma, not a smoker  Cardiovascular   Exercise tolerance: good (4-7 METS)    ECG reviewed    (+) hypertension, CHF (remote hx viral cardiomyopathy. EF recovered on most recent echo.) , hyperlipidemia  (-) past MI, CAD, dysrhythmias, angina      Neuro/Psych  (+) psychiatric history Depression  (-) seizures, CVA  GI/Hepatic/Renal/Endo    (+) renal disease-, thyroid problem hypothyroidism  (-) GERD, liver disease, diabetes    Musculoskeletal     Abdominal    Substance History   (+) alcohol use (prior hx)     OB/GYN          Other   arthritis,                 Anesthesia Plan    ASA 2     general       Anesthetic plan, risks, benefits, and alternatives have been provided, discussed and informed consent has been obtained with: patient.    CODE STATUS:    Code Status (Patient has no pulse and is not breathing): CPR (Attempt to Resuscitate)  Medical Interventions (Patient has pulse or is breathing): Full Support

## 2023-12-11 NOTE — PLAN OF CARE
Goal Outcome Evaluation:  Plan of Care Reviewed With: patient        Progress: improving  Outcome Evaluation: VSS, contact spore isolation maintained, PO pain medication given with relief, CIWA 0, falls precautions maintained, small amount of vaginal bleeding, ivf infusing per order, up with assist x1, BM this shift

## 2023-12-11 NOTE — ANESTHESIA POSTPROCEDURE EVALUATION
Patient: Victoria H Sturgeon    Procedure Summary       Date: 12/11/23 Room / Location: [facility] OR  / [facility] MAIN OR    Anesthesia Start: 1012 Anesthesia Stop: 1046    Procedure: DILATATION AND CURETTAGE HYSTEROSCOPY WITH MYOSURE (Vagina) Diagnosis:       PMB (postmenopausal bleeding)      Thickened endometrium      (PMB (postmenopausal bleeding) [N95.0])      (Thickened endometrium [R93.89])    Surgeons: Zeynep Phan MD Provider: Luan Leon MD    Anesthesia Type: general ASA Status: 2            Anesthesia Type: general    Vitals  Vitals Value Taken Time   /79 12/11/23 1200   Temp 36.4 °C (97.6 °F) 12/11/23 1050   Pulse 72 12/11/23 1206   Resp 16 12/11/23 1200   SpO2 96 % 12/11/23 1206   Vitals shown include unfiled device data.        Post Anesthesia Care and Evaluation    Patient location during evaluation: bedside  Pain management: adequate    Airway patency: patent  Anesthetic complications: No anesthetic complications    Cardiovascular status: acceptable  Respiratory status: acceptable  Hydration status: acceptable

## 2023-12-12 ENCOUNTER — TELEPHONE (OUTPATIENT)
Dept: GASTROENTEROLOGY | Facility: CLINIC | Age: 68
End: 2023-12-12
Payer: MEDICARE

## 2023-12-12 ENCOUNTER — READMISSION MANAGEMENT (OUTPATIENT)
Dept: CALL CENTER | Facility: HOSPITAL | Age: 68
End: 2023-12-12
Payer: MEDICARE

## 2023-12-12 VITALS
HEIGHT: 68 IN | DIASTOLIC BLOOD PRESSURE: 78 MMHG | BODY MASS INDEX: 23.86 KG/M2 | HEART RATE: 80 BPM | TEMPERATURE: 97.9 F | SYSTOLIC BLOOD PRESSURE: 128 MMHG | RESPIRATION RATE: 16 BRPM | WEIGHT: 157.41 LBS | OXYGEN SATURATION: 97 %

## 2023-12-12 PROBLEM — A04.0 ENTERITIS, ENTEROPATHOGENIC E. COLI: Status: ACTIVE | Noted: 2023-12-12

## 2023-12-12 PROBLEM — A04.72 CLOSTRIDIUM DIFFICILE DIARRHEA: Status: ACTIVE | Noted: 2023-12-11

## 2023-12-12 LAB
ALBUMIN SERPL-MCNC: 3.3 G/DL (ref 3.5–5.2)
ALBUMIN/GLOB SERPL: 1.4 G/DL
ALP SERPL-CCNC: 144 U/L (ref 39–117)
ALT SERPL W P-5'-P-CCNC: 64 U/L (ref 1–33)
ANION GAP SERPL CALCULATED.3IONS-SCNC: 9.4 MMOL/L (ref 5–15)
AST SERPL-CCNC: 34 U/L (ref 1–32)
BILIRUB SERPL-MCNC: 0.3 MG/DL (ref 0–1.2)
BUN SERPL-MCNC: 9 MG/DL (ref 8–23)
BUN/CREAT SERPL: 14.5 (ref 7–25)
CALCIUM SPEC-SCNC: 8 MG/DL (ref 8.6–10.5)
CHLORIDE SERPL-SCNC: 105 MMOL/L (ref 98–107)
CO2 SERPL-SCNC: 17.6 MMOL/L (ref 22–29)
CREAT SERPL-MCNC: 0.62 MG/DL (ref 0.57–1)
DEPRECATED RDW RBC AUTO: 46.7 FL (ref 37–54)
EGFRCR SERPLBLD CKD-EPI 2021: 97.1 ML/MIN/1.73
ERYTHROCYTE [DISTWIDTH] IN BLOOD BY AUTOMATED COUNT: 13.8 % (ref 12.3–15.4)
GLOBULIN UR ELPH-MCNC: 2.4 GM/DL
GLUCOSE SERPL-MCNC: 200 MG/DL (ref 65–99)
HCT VFR BLD AUTO: 33.8 % (ref 34–46.6)
HGB BLD-MCNC: 11.8 G/DL (ref 12–15.9)
LAB AP CASE REPORT: NORMAL
LAB AP DIAGNOSIS COMMENT: NORMAL
MCH RBC QN AUTO: 32.7 PG (ref 26.6–33)
MCHC RBC AUTO-ENTMCNC: 34.9 G/DL (ref 31.5–35.7)
MCV RBC AUTO: 93.6 FL (ref 79–97)
PATH REPORT.FINAL DX SPEC: NORMAL
PATH REPORT.GROSS SPEC: NORMAL
PLATELET # BLD AUTO: 201 10*3/MM3 (ref 140–450)
PMV BLD AUTO: 10.2 FL (ref 6–12)
POTASSIUM SERPL-SCNC: 3.9 MMOL/L (ref 3.5–5.2)
PROT SERPL-MCNC: 5.7 G/DL (ref 6–8.5)
RBC # BLD AUTO: 3.61 10*6/MM3 (ref 3.77–5.28)
SODIUM SERPL-SCNC: 132 MMOL/L (ref 136–145)
WBC NRBC COR # BLD AUTO: 9.01 10*3/MM3 (ref 3.4–10.8)

## 2023-12-12 PROCEDURE — 25010000002 MORPHINE PER 10 MG: Performed by: OBSTETRICS & GYNECOLOGY

## 2023-12-12 PROCEDURE — 85027 COMPLETE CBC AUTOMATED: CPT | Performed by: NURSE PRACTITIONER

## 2023-12-12 PROCEDURE — 99232 SBSQ HOSP IP/OBS MODERATE 35: CPT | Performed by: PHYSICIAN ASSISTANT

## 2023-12-12 PROCEDURE — 80053 COMPREHEN METABOLIC PANEL: CPT | Performed by: NURSE PRACTITIONER

## 2023-12-12 PROCEDURE — 99233 SBSQ HOSP IP/OBS HIGH 50: CPT | Performed by: INTERNAL MEDICINE

## 2023-12-12 RX ORDER — HYDROCODONE BITARTRATE AND ACETAMINOPHEN 5; 325 MG/1; MG/1
1 TABLET ORAL EVERY 8 HOURS PRN
Status: DISCONTINUED | OUTPATIENT
Start: 2023-12-12 | End: 2023-12-12 | Stop reason: HOSPADM

## 2023-12-12 RX ORDER — FAMOTIDINE 20 MG/1
20 TABLET, FILM COATED ORAL DAILY
Qty: 30 TABLET | Refills: 0 | Status: SHIPPED | OUTPATIENT
Start: 2023-12-13

## 2023-12-12 RX ORDER — VANCOMYCIN HYDROCHLORIDE 125 MG/1
125 CAPSULE ORAL EVERY 6 HOURS SCHEDULED
Status: DISCONTINUED | OUTPATIENT
Start: 2023-12-12 | End: 2023-12-12 | Stop reason: HOSPADM

## 2023-12-12 RX ORDER — VANCOMYCIN HYDROCHLORIDE 125 MG/1
125 CAPSULE ORAL EVERY 6 HOURS SCHEDULED
Qty: 16 CAPSULE | Refills: 0 | Status: SHIPPED | OUTPATIENT
Start: 2023-12-12 | End: 2023-12-16

## 2023-12-12 RX ORDER — LANOLIN ALCOHOL/MO/W.PET/CERES
100 CREAM (GRAM) TOPICAL DAILY
Qty: 30 TABLET | Refills: 0 | Status: SHIPPED | OUTPATIENT
Start: 2023-12-15

## 2023-12-12 RX ADMIN — FOLIC ACID 1 MG: 1 TABLET ORAL at 08:22

## 2023-12-12 RX ADMIN — ROSUVASTATIN CALCIUM 5 MG: 5 TABLET, FILM COATED ORAL at 08:22

## 2023-12-12 RX ADMIN — FAMOTIDINE 20 MG: 20 TABLET ORAL at 08:22

## 2023-12-12 RX ADMIN — OXYBUTYNIN CHLORIDE 10 MG: 10 TABLET, EXTENDED RELEASE ORAL at 08:21

## 2023-12-12 RX ADMIN — MORPHINE SULFATE 1 MG: 2 INJECTION, SOLUTION INTRAMUSCULAR; INTRAVENOUS at 00:10

## 2023-12-12 RX ADMIN — HYDROCODONE BITARTRATE AND ACETAMINOPHEN 1 TABLET: 5; 325 TABLET ORAL at 03:22

## 2023-12-12 RX ADMIN — DULOXETINE HYDROCHLORIDE 60 MG: 60 CAPSULE, DELAYED RELEASE ORAL at 08:21

## 2023-12-12 RX ADMIN — LIOTHYRONINE SODIUM 5 MCG: 5 TABLET ORAL at 08:22

## 2023-12-12 RX ADMIN — VANCOMYCIN HYDROCHLORIDE 125 MG: 125 CAPSULE ORAL at 12:40

## 2023-12-12 RX ADMIN — CARVEDILOL 12.5 MG: 12.5 TABLET, FILM COATED ORAL at 08:22

## 2023-12-12 RX ADMIN — PROGESTERONE 100 MG: 100 CAPSULE ORAL at 08:23

## 2023-12-12 RX ADMIN — HYDROCODONE BITARTRATE AND ACETAMINOPHEN 1 TABLET: 5; 325 TABLET ORAL at 10:20

## 2023-12-12 RX ADMIN — Medication 1 TABLET: at 08:23

## 2023-12-12 NOTE — PROGRESS NOTES
Case Management Discharge Note      Final Note: Discharged home. Cinthya Alexander RN         Selected Continued Care - Discharged on 12/12/2023 Admission date: 12/9/2023 - Discharge disposition: Home or Self Care         Transportation Services  Private: Car    Final Discharge Disposition Code: 01 - home or self-care

## 2023-12-12 NOTE — PLAN OF CARE
Goal Outcome Evaluation:  Plan of Care Reviewed With: patient        Progress: improving  Outcome Evaluation: Pt is concerned that she is no longer on vancomycin. She has c/o abdominal discomfort since her hysteroscopy. she c/o discomfort with urinating. She had received pyridium. she ihas been afebrile VS stable and no stools tonight. She continues in contact spore isolation. She has scored some on the CIWA scale tonight. She has been a little irritable and appeared a little anxious. She has taken some M/S alternating with norco for pain.

## 2023-12-12 NOTE — PROGRESS NOTES
Attempted to call patient, unable to reach and left message.  Will try to call again  Your pathology shows an endometrial polyp but also complex hyperplasia.  This is likely because of the estrogen patch.  Recommend stopping the estrogen but continuing on the oral progesterone as it will treat to the hyperplasia.  We then need to repeat an ultrasound and follow-up in 3 months    Zeynep Phan MD  12/12/2023  13:32 EST

## 2023-12-12 NOTE — NURSING NOTE
Pt present during discharge teaching. Pt received her medications from [facility]. Pt instructed to call and make an appointment with MD. All belongings with pt. All questions answered. Pt ready to go home.

## 2023-12-12 NOTE — TELEPHONE ENCOUNTER
patient to set up a hospital f/u w/ Rianna in 2-3 weeks? She was admitted for colitis. CURRENTLY OK FOR THE HUB TO READ

## 2023-12-12 NOTE — OUTREACH NOTE
Prep Survey      Flowsheet Row Responses   [facility] facility patient discharged from? [facility]   Is LACE score < 7 ? No   Eligibility Readm Mgmt   Discharge diagnosis Colitis   Does the patient have one of the following disease processes/diagnoses(primary or secondary)? General Surgery   Does the patient have Home health ordered? No   Is there a DME ordered? No   Medication alerts for this patient see AVS   Prep survey completed? Yes            Zeynep AREVALO - Registered Nurse
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
## 2023-12-12 NOTE — DISCHARGE SUMMARY
Patient Name: Victoria H Sturgeon  : 1955  MRN: [id number]    Date of Admission: 2023  Date of Discharge:  2023  Primary Care Physician: Deana Wilcox MD      Chief Complaint:   Abdominal Pain      Discharge Diagnoses     Active Hospital Problems    Diagnosis  POA    **Colitis [K52.9]  Yes    Enteritis, enteropathogenic E. coli [A04.0]  Unknown    Clostridium difficile diarrhea [A04.72]  Yes    Vaginal bleeding [N93.9]  Yes    Abdominal pain [R10.9]  Yes    Diarrhea [R19.7]  Yes    PMB (postmenopausal bleeding) [N95.0]  Yes    Thickened endometrium [R93.89]  Yes    Alcohol abuse [F10.10]  Yes    NICM (nonischemic cardiomyopathy) [I42.8]  Yes    Hypertension [I10]  Yes    Macrocytic anemia [D53.9]  Yes    Hyponatremia [E87.1]  Yes      Resolved Hospital Problems   No resolved problems to display.        Hospital Course     Ms. Sturgeon is a 68 y.o. female with a history of hypertension, hyperlipidemia, HPV test positive, history of alcohol abuse, nonischemic cardiomyopathy, C. difficile colitis, depression who presented to [facility] initially complaining of abdominal pain.  Please see the admitting history and physical for further details.  She was found to have enteropathogenic E. coli enteritis and close surety around episode diarrhea and was admitted to the hospital for further evaluation and treatment.      Patient with definite vaginal bleeding underwent D&C hysteroscopy with Myosure with gynecology on 2023.  Gynecology recommends close follow-up appointment for biopsy results after discharge.  Serum hemoglobin stable trend.     Gastroenterology followed suspected colitis and transaminitis.  MRCP obtained for increasing prominence of common bile duct  yet ending unremarkable and ERCP not indicated.  Gastroenterology recommend close follow-up appointment in outpatient setting for further discussion regarding colonoscopy.    Patient received empiric Vancocin  and Zosyn for colitis. Enteropathogenic E. Coli and C. difficile toxin PCR positive on stool studies.  C. difficile Ag negative & diarrhea improved during hospital course.  Infectious disease recommended additional 4 days Vancocin after discharge given Zosyn provided during hospital course which increases risk for active C. difficile colitis infection.    Low scores for CIWA without concern for delirium tremens and for continued monitoring giving now utilization of benzodiazepine as needed.  Discussed with patient recommendations alcohol cessation indefinitely.    Patient remained afebrile during hospital course.  LFTs improved with trend.  Patient tolerating physical activity and diet--appears medically stable for discharge home on 12 5023 and agrees with plan for follow-up as previously discussed.    Day of Discharge     Physical Exam:  Temp:  [96.8 °F (36 °C)-98 °F (36.7 °C)] 97.7 °F (36.5 °C)  Heart Rate:  [] 80  Resp:  [16] 16  BP: (121-152)/(68-84) 130/78  Body mass index is 23.93 kg/m².    Physical Exam  Constitutional:       General: She is not in acute distress.     Appearance: She is not toxic-appearing.   HENT:      Head: Normocephalic and atraumatic.   Eyes:      Extraocular Movements: Extraocular movements intact.      Conjunctiva/sclera: Conjunctivae normal.   Cardiovascular:      Rate and Rhythm: Normal rate.      Heart sounds: Normal heart sounds.   Pulmonary:      Effort: Pulmonary effort is normal.      Breath sounds: Normal breath sounds.   Abdominal:      General: There is no distension.      Palpations: Abdomen is soft.   Musculoskeletal:      Cervical back: Normal range of motion.      Right lower leg: No edema.      Left lower leg: No edema.   Skin:     General: Skin is warm and dry.   Neurological:      Mental Status: She is alert and oriented to person, place, and time.      Cranial Nerves: No cranial nerve deficit.   Psychiatric:         Behavior: Behavior normal.         Thought  Content: Thought content normal.         Consultants     Consult Orders (all) (From admission, onward)       Start     Ordered    12/12/23 0940  Inpatient Infectious Diseases Consult  Once        Specialty:  Infectious Diseases  Provider:  [facility] MD    12/12/23 0940 12/09/23 2259  Inpatient Gastroenterology Consult  Once        Specialty:  Gastroenterology  Provider:  Roberto Martinez MD    12/09/23 2258 12/09/23 2258  Inpatient Obstetrics / Gynecology Consult  Once        Specialty:  Obstetrics and Gynecology  Provider:  Jorge Alberto Cancino MD    12/09/23 2258 12/09/23 2121  [facility] (on-call MD unless specified) Details  Once        Specialty:  Hospitalist  Provider:  (Not yet assigned)    12/09/23 2120                  Procedures     DILATATION AND CURETTAGE HYSTEROSCOPY WITH MYOSURE    Imaging Results (All)       Procedure Component Value Units Date/Time    MRI abdomen w wo contrast mrcp [[id number]] Collected: 12/11/23 0713     Updated: 12/12/23 1216    Narrative:      MRI ABDOMEN W WO CONTRAST MRCP-     HISTORY: 68-year-old female with abdominal pain. Cholecystectomy in the  past.     TECHNIQUE: Routine MRCP was performed without and with IV contrast.  Compared with CT abdomen 12/09/2023.     FINDINGS:  1. Common bile duct measures 7 mm in diameter and tapers to 5-6 mm in  the preampullary portion of the common bile duct. There is no abnormal  signal intensity within the common bile duct or biliary tree. There is  no choledocholithiasis.  2. Pancreatic duct appears within normal limits. There is no pancreatic  divisum. Patent duct of Santorini is noted.  3. There is mild hepatic steatosis. Morphology of the liver and splenic  size are within normal limits. There is no free fluid or  lymphadenopathy.     This report was finalized on 12/12/2023 12:13 PM by Dr. Kavitha Rosas M.D  on Workstation: [id number]       US Pelvis Transvaginal Non OB [[id number]] Collected: 12/10/23 1102     Updated:  12/10/23 1139    Narrative:      US PELVIC AND TRANSVAGINAL NONOBSTETRICAL-12/10/2023     HISTORY: Postmenopausal bleeding.     The uterus demonstrates mild thickening of the double layer endometrial  stripe measuring up to approximately 6.7 mm. No fluid is seen in the  uterus. No uterine masses are seen.     The ovaries are not visualized. No pelvic masses or fluid is seen.       Impression:      1. Mild thickening of the endometrium in this postmenopausal patient.  FINDINGS could be related to endometrial hyperplasia or endometrial  carcinoma and further evaluation suggested.  2. No pelvic masses are seen. The ovaries are not seen.        This report was finalized on 12/10/2023 11:36 AM by Dr. Aquiles Perez M.D on Workstation: [id number]       CT Abdomen Pelvis With Contrast [[id number]] Collected: 12/09/23 2020     Updated: 12/09/23 2029    Narrative:      CT OF THE ABDOMEN PELVIS WITH CONTRAST     HISTORY: Left lower quadrant pain     COMPARISON: None available.     TECHNIQUE: Axial CT imaging was obtained through the abdomen and pelvis.  IV contrast was administered.     FINDINGS:  Images through the lung bases demonstrate some bibasilar scarring versus  atelectasis. No suspicious hepatic lesions are seen. The gallbladder is  absent. There is mild intrahepatic biliary dilatation, as well as some  dilatation of the common bile duct, measuring up to 9 mm distally. The  common bile duct does appear more prominent than on prior imaging from  June 2022. Correlation with liver function tests is recommended. There  is no pancreatic ductal dilatation. A few calcified granulomata are  noted within the spleen. Stomach, duodenum, and adrenal glands are  normal. The kidneys enhance symmetrically. There is no hydronephrosis.  No distal ureteral or bladder stones are seen. There are some aortoiliac  calcifications. Uterus appears normal, as is the appendix. There is  colonic diverticulosis. Full assessment of the colon is  limited due to  incomplete distention, although there may be some diffuse wall  thickening. Colitis is not excluded. There is also a trace amount of  free fluid noted within the pelvis. There is no evidence of small bowel  obstruction. No pneumatosis or free air is seen. Compression deformity  involving the inferior endplate of T12 is chronic.          Impression:         1. Full assessment of the colon is limited, as it is incompletely  distended. However, images do suggest a diffusely thick-walled  appearance, which may reflect colitis. Patient is noted to have a small  amount of free fluid within the pelvis, likely reactive.  2. Increasing prominence of the common bile duct when compared to prior  study from 2022. Correlation with liver function tests is recommended.     Radiation dose reduction techniques were utilized, including automated  exposure control and exposure modulation based on body size.        This report was finalized on 12/9/2023 8:26 PM by Dr. Zeinab Oviedo M.D on Workstation: [id number]               Results for orders placed during the hospital encounter of 06/21/22    Adult Transthoracic Echo Complete W/ Color, Spectral and Contrast if Necessary Per Protocol    Interpretation Summary  · Calculated left ventricular EF = 59% Estimated left ventricular EF was in agreement with the calculated left ventricular EF. Left ventricular systolic function is normal.  · Left ventricular diastolic function was normal.  · Mild mitral valve regurgitation is present with a centrally-directed jet noted.  · Mild tricuspid valve regurgitation is present.  · Estimated right ventricular systolic pressure from tricuspid regurgitation is normal (<35 mmHg).    Pertinent Labs     Results from last 7 days   Lab Units 12/12/23  0504 12/11/23  0354 12/10/23  0759 12/10/23  0032 12/09/23  1815   WBC 10*3/mm3 9.01 6.46 8.57  --  9.31   HEMOGLOBIN g/dL 11.8* 11.9* 14.3 13.3 14.4   PLATELETS 10*3/mm3 201 194 244  --   "232     Results from last 7 days   Lab Units 12/12/23  0504 12/11/23  0354 12/10/23  0759 12/09/23  1900   SODIUM mmol/L 132* 136 130* 129*   POTASSIUM mmol/L 3.9 4.3 3.1* 3.6   CHLORIDE mmol/L 105 110* 96* 96*   CO2 mmol/L 17.6* 19.0* 19.9* 17.7*   BUN mg/dL 9 8 7* 9   CREATININE mg/dL 0.62 0.61 0.71 0.53*   GLUCOSE mg/dL 200* 116* 107* 104*   EGFR mL/min/1.73 97.1 97.5 92.7 100.9     Results from last 7 days   Lab Units 12/12/23  0504 12/11/23  0354 12/10/23  0759 12/09/23  1900   ALBUMIN g/dL 3.3* 3.3* 3.8 3.6   BILIRUBIN mg/dL 0.3 0.3 0.3 0.3   ALK PHOS U/L 144* 154* 199* 136*   AST (SGOT) U/L 34* 56* 98* 96*   ALT (SGPT) U/L 64* 80* 121* 117*     Results from last 7 days   Lab Units 12/12/23  0504 12/11/23  0354 12/10/23  0759 12/09/23  1900   CALCIUM mg/dL 8.0* 8.2* 8.5* 8.1*   ALBUMIN g/dL 3.3* 3.3* 3.8 3.6   MAGNESIUM mg/dL  --  2.0  --   --      Results from last 7 days   Lab Units 12/09/23  1815   LIPASE U/L 30             Invalid input(s): \"LDLCALC\"  Results from last 7 days   Lab Units 12/09/23  2322 12/09/23  2319   BLOODCX  No growth at 2 days No growth at 2 days         Test Results Pending at Discharge     Pending Labs       Order Current Status    Blood Culture - Blood, Arm, Left Preliminary result    Blood Culture - Blood, Arm, Right Preliminary result            Discharge Details        Discharge Medications        New Medications        Instructions Start Date   famotidine 20 MG tablet  Commonly known as: PEPCID   20 mg, Oral, Daily   Start Date: December 13, 2023     thiamine 100 MG tablet  Commonly known as: VITAMIN B1   100 mg, Oral, Daily   Start Date: December 15, 2023     vancomycin 125 MG capsule  Commonly known as: VANCOCIN   125 mg, Oral, Every 6 Hours Scheduled             Changes to Medications        Instructions Start Date   estradiol 0.075 MG/24HR patch  Commonly known as: WHITNEY BUTT  What changed: See the new instructions.   APPLY 1 PATCH TWICE A WEEK         "     Continue These Medications        Instructions Start Date   Advair Diskus 250-50 MCG/DOSE DISKUS  Generic drug: Fluticasone-Salmeterol   1 puff, Inhalation, 2 Times Daily PRN      Advil -25 MG capsule  Generic drug: Ibuprofen-diphenhydrAMINE HCl   1 tablet, Oral, Nightly PRN      carvedilol 25 MG tablet  Commonly known as: COREG   25 mg, Oral, Daily      cholecalciferol 25 MCG (1000 UT) tablet  Commonly known as: VITAMIN D3   5,000 Units, Oral, Daily      DULoxetine 60 MG capsule  Commonly known as: CYMBALTA   60 mg, Oral, Daily      ferrous sulfate 325 (65 FE) MG tablet   325 mg, Oral, 3 Times Weekly, Monday, Wednesday, Friday      folic acid 1 MG tablet  Commonly known as: FOLVITE   1 mg, Oral, Daily      HYDROcodone-acetaminophen 5-325 MG per tablet  Commonly known as: NORCO   1 tablet, Oral, Every 6 Hours PRN      liothyronine 5 MCG tablet  Commonly known as: CYTOMEL   Take 1 tablet by mouth Daily.      Melatonin 10 MG capsule   1 capsule, Oral, Nightly PRN      oxybutynin XL 10 MG 24 hr tablet  Commonly known as: DITROPAN-XL   10 mg, Oral, 2 Times Daily      Progesterone 100 MG capsule  Commonly known as: PROMETRIUM   TAKE 1 CAPSULE BY MOUTH EVERY DAY      rosuvastatin 5 MG tablet  Commonly known as: CRESTOR   5 mg, Oral, Daily      traZODone 100 MG tablet  Commonly known as: DESYREL   100 mg, Oral, Nightly             Stop These Medications      omeprazole 40 MG capsule  Commonly known as: priLOSEC              Allergies   Allergen Reactions    Cefdinir Anaphylaxis    Latex Anaphylaxis    Amoxicillin-Pot Clavulanate Nausea And Vomiting    Eggs Or Egg-Derived Products Unknown - Low Severity       Discharge Disposition:  Home or Self Care      Discharge Diet:  Diet Order   Procedures    Diet: Gastrointestinal Diets; Fiber-Restricted, Low Irritant; Texture: Regular Texture (IDDSI 7); Fluid Consistency: Thin (IDDSI 0)       Discharge Activity:   Activity Instructions       Activity as Tolerated               CODE STATUS:    Code Status and Medical Interventions:   Ordered at: 12/10/23 0000     Code Status (Patient has no pulse and is not breathing):    CPR (Attempt to Resuscitate)     Medical Interventions (Patient has pulse or is breathing):    Full Support       No future appointments.  Additional Instructions for the Follow-ups that You Need to Schedule       Discharge Follow-up with PCP   As directed       Currently Documented PCP:    Deana Wilcox MD    PCP Phone Number:    [phone]     Follow Up Details: Please call to schedule a 1 week or earliest available follow-up appointment PCP; BP, BMP               Follow-up Information       Deana Wilcox MD .    Specialty: Internal Medicine  Why: Please call to schedule a 1 week or earliest available follow-up appointment PCP; BP, BMP  Contact information:  [address] [facility] [address]  [facility] [id number] [phone]               Zach Duong MD. Schedule an appointment as soon as possible for a visit.    Specialty: Gastroenterology  Why: Please call to schedule a 2-week follow-up appointment with gastroenterology as previously discussed  Contact information:  [address]  [facility] [address]  [facility] [id number] [phone]               Zeynep Phan MD. Schedule an appointment as soon as possible for a visit.    Specialty: Obstetrics and Gynecology  Why: Please call to schedule follow-up appointment with GYN as previously discussed  Contact information:  [address] [facility] [address] [phone]                             Additional Instructions for the Follow-ups that You Need to Schedule       Discharge Follow-up with PCP   As directed       Currently Documented PCP:    Deana Wilcox MD    PCP Phone Number:    [phone]     Follow Up Details: Please call to schedule a 1 week or earliest available follow-up appointment PCP; BP, BMP            Time Spent on Discharge:  Greater than 30 minutes      Abdias  JURGEN Rivas  [facility]  12/12/23  12:24 EST

## 2023-12-12 NOTE — PROGRESS NOTES
[facility]  Inpatient Progress Note    Reason for Follow Up:  Diarrhea    Subjective     Interval History:   S/p dilatation of curettage hysteroscopy with myosure 12/11 with Dr. Phan.   Pt reports she has had three small BMs today, some watery BMs and some chunks of formed stools. She states she was feeling better when she was taking vancomycin.  No rectal bleeding. Diarrhea overall improved.   She is c/o pain with urination since having the surgery yesterday.       Current Facility-Administered Medications:     acetaminophen (TYLENOL) tablet 650 mg, 650 mg, Oral, Q4H PRN **OR** acetaminophen (TYLENOL) 160 MG/5ML oral solution 650 mg, 650 mg, Oral, Q4H PRN **OR** acetaminophen (TYLENOL) suppository 650 mg, 650 mg, Rectal, Q4H PRN, Zeynep Phan MD    budesonide-formoterol (SYMBICORT) 160-4.5 MCG/ACT inhaler 2 puff, 2 puff, Inhalation, BID - RT, Zeynep Phan MD    Calcium Replacement - Follow Nurse / BPA Driven Protocol, , Does not apply, PRN, Zeynep Phan MD    carvedilol (COREG) tablet 12.5 mg, 12.5 mg, Oral, BID With Meals, Zeynep Phan MD, 12.5 mg at 12/12/23 0822    DULoxetine (CYMBALTA) DR capsule 60 mg, 60 mg, Oral, Daily, Zeynep Phan MD, 60 mg at 12/12/23 0821    estradiol (CLIMARA) 0.075 MG/24HR patch 1 patch, 1 patch, Transdermal, Weekly, Zeynep Phan MD, 1 patch at 12/10/23 0910    famotidine (PEPCID) tablet 20 mg, 20 mg, Oral, Daily, Zeynep Phan MD, 20 mg at 12/12/23 0822    folic acid (FOLVITE) tablet 1 mg, 1 mg, Oral, Daily, Zeynep Phan MD, 1 mg at 12/12/23 0822    HYDROcodone-acetaminophen (NORCO) 5-325 MG per tablet 1 tablet, 1 tablet, Oral, Q6H PRN, Zeynep Phan MD, 1 tablet at 12/12/23 0322    liothyronine (CYTOMEL) tablet 5 mcg, 5 mcg, Oral, Daily, Zeynep Phan MD, 5 mcg at 12/12/23 0822    loperamide (IMODIUM) capsule 2 mg, 2 mg, Oral, 4x Daily PRN, Abdias Vuong APRN, 2 mg at 12/11/23 1852    LORazepam (ATIVAN)  tablet 1 mg, 1 mg, Oral, Q1H PRN **OR** midazolam (VERSED) injection 2 mg, 2 mg, Intravenous, Q1H PRN **OR** LORazepam (ATIVAN) tablet 2 mg, 2 mg, Oral, Q1H PRN **OR** midazolam (VERSED) injection 4 mg, 4 mg, Intravenous, Q1H PRN **OR** midazolam (VERSED) injection 4 mg, 4 mg, Intravenous, Q15 Min PRN **OR** midazolam (VERSED) injection 4 mg, 4 mg, Intramuscular, Q15 Min PRN, Zeynep Phan MD    Magnesium Standard Dose Replacement - Follow Nurse / BPA Driven Protocol, , Does not apply, PRNSylvester Kristin, MD    morphine injection 1 mg, 1 mg, Intravenous, Q4H PRN, 1 mg at 12/12/23 0010 **AND** naloxone (NARCAN) injection 0.4 mg, 0.4 mg, Intravenous, Q5 Min PRN, Zeynep Phan MD    multivitamin with minerals 1 tablet, 1 tablet, Oral, Daily, Zeynep Phan MD, 1 tablet at 12/12/23 0823    ondansetron (ZOFRAN) injection 4 mg, 4 mg, Intravenous, Q6H PRN, Zeynep Phan MD, 4 mg at 12/10/23 0410    oxybutynin XL (DITROPAN-XL) 24 hr tablet 10 mg, 10 mg, Oral, BID, Zeynep Phan MD, 10 mg at 12/12/23 0821    Pharmacy Consult, , Does not apply, Continuous PRNSylvester Kristin, MD    phenazopyridine (PYRIDIUM) tablet 100 mg, 100 mg, Oral, TID PRN, Zeynep Phan MD, 100 mg at 12/11/23 1852    Phosphorus Replacement - Follow Nurse / BPA Driven Protocol, , Does not apply, PRSylvester ELLIOTT Kristin, MD    Potassium Replacement - Follow Nurse / BPA Driven Protocol, , Does not apply, PRSylvester ELLIOTT Kristin, MD    Progesterone (PROMETRIUM) capsule 100 mg, 100 mg, Oral, Daily, Zeynep Phan MD, 100 mg at 12/12/23 0823    rosuvastatin (CRESTOR) tablet 5 mg, 5 mg, Oral, Daily, Zeynep Phan MD, 5 mg at 12/12/23 0822    sodium chloride 0.9 % flush 10 mL, 10 mL, Intravenous, Q12H, Zeynep Phan MD, 10 mL at 12/11/23 2011    sodium chloride 0.9 % flush 10 mL, 10 mL, Intravenous, PRN, Zeynep Phan MD    sodium chloride 0.9 % infusion 40 mL, 40 mL, Intravenous, PRN, Zeynep Phan MD    sodium  chloride 0.9 % infusion, 100 mL/hr, Intravenous, Continuous, Zeynep Phan MD, Last Rate: 100 mL/hr at 12/11/23 2301, 100 mL/hr at 12/11/23 2301    thiamine (B-1) injection 200 mg, 200 mg, Intravenous, Q8H, 200 mg at 12/11/23 2159 **FOLLOWED BY** [START ON 12/15/2023] thiamine (VITAMIN B-1) tablet 100 mg, 100 mg, Oral, Daily, Zeynep Phan MD    traZODone (DESYREL) tablet 100 mg, 100 mg, Oral, Nightly, Zeynep Phan MD, 100 mg at 12/11/23 2011        Objective     Vital Signs  Temp:  [96.8 °F (36 °C)-98.1 °F (36.7 °C)] 97.7 °F (36.5 °C)  Heart Rate:  [] 80  Resp:  [15-16] 16  BP: (111-152)/() 130/78  Body mass index is 23.93 kg/m².    Intake/Output Summary (Last 24 hours) at 12/12/2023 0930  Last data filed at 12/12/2023 0925  Gross per 24 hour   Intake 1472 ml   Output 1000 ml   Net 472 ml     I/O this shift:  In: 420 [P.O.:420]  Out: -      Physical Exam:   General: patient awake, alert and cooperative   Eyes: Normal lids and lashes, no scleral icterus   Abdomen: soft, mild distention, mild diffuse TTP  Results Review:     I reviewed the patient's new clinical results.    Results from last 7 days   Lab Units 12/12/23  0504 12/11/23  0354 12/10/23  0759   WBC 10*3/mm3 9.01 6.46 8.57   HEMOGLOBIN g/dL 11.8* 11.9* 14.3   HEMATOCRIT % 33.8* 34.6 40.3   PLATELETS 10*3/mm3 201 194 244     Results from last 7 days   Lab Units 12/12/23  0504 12/11/23  0354 12/10/23  0759   SODIUM mmol/L 132* 136 130*   POTASSIUM mmol/L 3.9 4.3 3.1*   CHLORIDE mmol/L 105 110* 96*   CO2 mmol/L 17.6* 19.0* 19.9*   BUN mg/dL 9 8 7*   CREATININE mg/dL 0.62 0.61 0.71   CALCIUM mg/dL 8.0* 8.2* 8.5*   BILIRUBIN mg/dL 0.3 0.3 0.3   ALK PHOS U/L 144* 154* 199*   ALT (SGPT) U/L 64* 80* 121*   AST (SGOT) U/L 34* 56* 98*   GLUCOSE mg/dL 200* 116* 107*     Results from last 7 days   Lab Units 12/09/23  1815   INR  1.01     Lab Results   Lab Value Date/Time    LIPASE 30 12/09/2023 1815       Radiology:  MRI abdomen w wo contrast  mrcp         US Pelvis Transvaginal Non OB   Final Result   1. Mild thickening of the endometrium in this postmenopausal patient.   FINDINGS could be related to endometrial hyperplasia or endometrial   carcinoma and further evaluation suggested.   2. No pelvic masses are seen. The ovaries are not seen.           This report was finalized on 12/10/2023 11:36 AM by Dr. Aquiles Perez M.D on Workstation: [id number]          CT Abdomen Pelvis With Contrast   Final Result       1. Full assessment of the colon is limited, as it is incompletely   distended. However, images do suggest a diffusely thick-walled   appearance, which may reflect colitis. Patient is noted to have a small   amount of free fluid within the pelvis, likely reactive.   2. Increasing prominence of the common bile duct when compared to prior   study from 2022. Correlation with liver function tests is recommended.       Radiation dose reduction techniques were utilized, including automated   exposure control and exposure modulation based on body size.           This report was finalized on 12/9/2023 8:26 PM by Dr. Zeinab Oviedo M.D on Workstation: [id number]              Assessment & Plan     Active Hospital Problems    Diagnosis     **Colitis     Clostridioides difficile infection     Vaginal bleeding     Abdominal pain     Diarrhea     PMB (postmenopausal bleeding)     Thickened endometrium     Alcohol abuse     NICM (nonischemic cardiomyopathy)     Hypertension     Macrocytic anemia     Hyponatremia          Assessment:   Abdominal pain and diarrhea- Positive Enteropathogenic E.coli. C.diff toxin PCR abnormal, however c.diff toxin Ag negative. Diarrhea overall much improved.   CAT scan with likely colitis  Increased prominence of the bile duct on CAT scan  MRCP/MRI w/o CBD dilation, choledocholithiasis, cancer/mass. Mild fatty liver.   History of diverticulitis in the distant past  Elevated liver tests- improving; question if due to recent  ETOH binge   Vaginal bleeding s/p dilatation and curettage hysteroscopy with myosure     Plan:   Patient insists on restarting vancomycin. I discussed the findings of stool studies with her (+EPEC) and that generally does not require antibiotic management. Her currently positive c.diff PCR, but negative toxin ag could be because of colonization from having c.diff earlier this year.   Discussed above with primary team, who will consult ID  Otherwise, patient okay to be d/c home from GI perspective and f/u outpatient in 2-3 weeks. Will need colonoscopy in 6-8 weeks.   Advised to abstain from ETOH    I discussed the patients findings and my recommendations with patient.         Cali Blanco PA-C  [facility]  [address] [place] [address]  Office: [phone]

## 2023-12-13 RX ORDER — PROGESTERONE 200 MG/1
200 CAPSULE ORAL DAILY
Qty: 90 CAPSULE | Refills: 3 | Status: SHIPPED | OUTPATIENT
Start: 2023-12-13

## 2023-12-13 NOTE — PROGRESS NOTES
Notified of her results- complex hyperplasia, if untreated could be at risk of endometrial cancer and is likely due to the hormones. Instructed to stop her estrogen patch but double dose of the progesterone. Needs follow up with US in 3 months    Zeynep Phan MD  12/13/2023  12:52 EST

## 2023-12-14 LAB
BACTERIA SPEC AEROBE CULT: NORMAL
BACTERIA SPEC AEROBE CULT: NORMAL

## 2023-12-15 ENCOUNTER — READMISSION MANAGEMENT (OUTPATIENT)
Dept: CALL CENTER | Facility: HOSPITAL | Age: 68
End: 2023-12-15
Payer: MEDICARE

## 2023-12-15 NOTE — OUTREACH NOTE
General Surgery Week 1 Survey      Flowsheet Row Responses   [facility] patient discharged from? [place]   Does the patient have one of the following disease processes/diagnoses(primary or secondary)? General Surgery   Week 1 attempt successful? No   Unsuccessful attempts Attempt 1            Kelin JOHNSON - Registered Nurse

## 2023-12-19 ENCOUNTER — READMISSION MANAGEMENT (OUTPATIENT)
Dept: CALL CENTER | Facility: HOSPITAL | Age: 68
End: 2023-12-19
Payer: MEDICARE

## 2023-12-19 NOTE — OUTREACH NOTE
General Surgery Week 1 Survey      Flowsheet Row Responses   [facility] patient discharged from? [place]   Does the patient have one of the following disease processes/diagnoses(primary or secondary)? General Surgery   Week 1 attempt successful? No  [Reached daughter, Deanna, who will alert patient to f/u call tomorrow.]   Unsuccessful attempts Attempt 2            Christy WOLF - Registered Nurse

## 2024-01-30 ENCOUNTER — READMISSION MANAGEMENT (OUTPATIENT)
Dept: CALL CENTER | Facility: HOSPITAL | Age: 69
End: 2024-01-30
Payer: MEDICARE

## 2024-01-30 NOTE — OUTREACH NOTE
General Surgery Week 3 Survey      Flowsheet Row Responses   [facility] patient discharged from? [place]   Does the patient have one of the following disease processes/diagnoses(primary or secondary)? General Surgery   Week 3 attempt successful? No   Unsuccessful attempts Attempt 1            Debbie RODRIGUEZ - Licensed Nurse

## 2024-02-02 ENCOUNTER — READMISSION MANAGEMENT (OUTPATIENT)
Dept: CALL CENTER | Facility: HOSPITAL | Age: 69
End: 2024-02-02
Payer: MEDICARE

## 2024-02-02 NOTE — OUTREACH NOTE
General Surgery Week 3 Survey      Flowsheet Row Responses   [facility] patient discharged from? [place]   Does the patient have one of the following disease processes/diagnoses(primary or secondary)? General Surgery   Week 3 attempt successful? No   Unsuccessful attempts Attempt 2            Aditi Bryant Registered Nurse

## 2024-03-13 ENCOUNTER — OFFICE VISIT (OUTPATIENT)
Dept: OBSTETRICS AND GYNECOLOGY | Age: 69
End: 2024-03-13
Payer: MEDICARE

## 2024-03-13 VITALS
DIASTOLIC BLOOD PRESSURE: 80 MMHG | WEIGHT: 170 LBS | BODY MASS INDEX: 25.76 KG/M2 | HEIGHT: 68 IN | SYSTOLIC BLOOD PRESSURE: 130 MMHG

## 2024-03-13 DIAGNOSIS — N84.0 ENDOMETRIAL POLYP: ICD-10-CM

## 2024-03-13 DIAGNOSIS — N85.01 COMPLEX ENDOMETRIAL HYPERPLASIA: Primary | ICD-10-CM

## 2024-03-13 NOTE — PROGRESS NOTES
"Subjective     Chief Complaint   Patient presents with    Gynecologic Exam     Complex hyperplasia, US today           Victoria H Sturgeon is a 68 y.o.  whose LMP is No LMP recorded. Patient is postmenopausal.     Pt presents today for 3 month follow up   She was seen in hospital on  for abdominal pain and had incidental finding of thickened endometrium and vaginal bleeding.  She had a D&C on  which revealed complex hyperplasia and endometrial polyp  She was instructed to stop her estradiol patch and double her progesterone which has been doing  She has not had any further bleeding  Pt of Dr Phan     No Additional Complaints Reported    The following portions of the patient's history were reviewed and updated as appropriate:vital signs, allergies, current medications, past medical history, past social history, past surgical history, and problem list      Review of Systems   Pertinent items are noted in HPI.     Objective      /80   Ht 172.7 cm (68\")   Wt 77.1 kg (170 lb)   BMI 25.85 kg/m²     Physical Exam    General:   alert and no distress   Heart: Not performed today   Lungs: Not performed today.   Breast: Not performed today   Neck: na   Abdomen: {Not performed today   CVA: Not performed today   Pelvis: External genitalia: normal general appearance  Urinary system: urethral meatus normal  Vaginal: normal mucosa without prolapse or lesions, normal without tenderness, induration or masses, and normal rugae  Cervix: normal appearance   Extremities: Not performed today   Neurologic: negative   Psychiatric: Normal affect, judgement, and mood       Lab Review   Labs: pathology from D&C reviewed     Imaging   Ultrasound - Pelvic Vaginal    Assessment & Plan     ASSESSMENT  1. Complex endometrial hyperplasia    2. Endometrial polyp        PLAN  1. No orders of the defined types were placed in this encounter.      2. Medications prescribed this encounter:      No orders of the defined types " were placed in this encounter.      3. Attempted EMB but patient did not tolerate procedure and wished to stop. Message to Dr. Bravo for follow up plan. She will continue progesterone.      Elizabeth Miller, APRN  3/13/2024

## 2024-05-09 ENCOUNTER — TELEPHONE (OUTPATIENT)
Dept: OBSTETRICS AND GYNECOLOGY | Age: 69
End: 2024-05-09

## 2024-05-09 NOTE — TELEPHONE ENCOUNTER
Provider: DR. KELLER    Caller: VICTORIA H STURGEON    Relationship to Patient: SELF    Pharmacy:     Phone Number: [phone]    Reason for Call: OUTPATIENT BIOPSY     When was the patient last seen: 03.13.24    PATIENT CALLING IN AND WOULD LIKE TO SEE ABOUT GETTING AN OUTPATIENT BIOPSY SCHEDULED FOR HER, PATIENT SAID THAT IT WAS TRIED TO BE DONE BACK IN MARCH BUT WAS UNSUCCESSFUL AND SHE WOULD LIKE TO KNOW  IF IT COULD BE TRIED AGAIN BUT THIS TIME IN THE HOSPITAL.  PATIENT JUST DOESN'T FEEL COMFORTABLE WAITING UNTIL JULY 31,24. PATIENT STILL WANTS TO SEE DR. KELLER ON THIS DAY.    PATIENT CAN BE REACHED .[phone]    THANK YOU

## 2024-05-22 ENCOUNTER — OFFICE VISIT (OUTPATIENT)
Dept: OBSTETRICS AND GYNECOLOGY | Age: 69
End: 2024-05-22
Payer: MEDICARE

## 2024-05-22 VITALS
WEIGHT: 170 LBS | SYSTOLIC BLOOD PRESSURE: 124 MMHG | DIASTOLIC BLOOD PRESSURE: 76 MMHG | BODY MASS INDEX: 25.76 KG/M2 | HEIGHT: 68 IN

## 2024-05-22 DIAGNOSIS — R63.5 WEIGHT GAIN: ICD-10-CM

## 2024-05-22 DIAGNOSIS — R45.86 MOOD CHANGES: Primary | ICD-10-CM

## 2024-05-22 RX ORDER — PROGESTERONE 100 MG/1
100 CAPSULE ORAL DAILY
Qty: 90 CAPSULE | Refills: 1 | Status: SHIPPED | OUTPATIENT
Start: 2024-05-22

## 2024-05-22 NOTE — PROGRESS NOTES
"Subjective     Chief Complaint   Patient presents with    Gynecologic Exam     Follow up discuss progesterone   Cc: weight gain , feeling down        Victoria H Sturgeon is a 68 y.o.  whose LMP is No LMP recorded. Patient is postmenopausal.     Pt presents today for follow up on promeutrium  She had D&C in December with Dr Phan and dx with complex endometrial hyperplasia  At this time she was taken off her estradiol patch and Prometrium was increased to 200 mg  Her US in march showed a thin endometrial lining, unable to repeat biopsy. Message was sent to Dr Bravo and she recommend repeating US again in 3 months with Dr Phan.   She is currently taking 200 mg of prometrium   She has noticed weight gain and mood changes since then  She has had no further bleeding  Her thyroid was normal in march    No Additional Complaints Reported    The following portions of the patient's history were reviewed and updated as appropriate:vital signs, allergies, current medications, past medical history, past social history, past surgical history, and problem list      Review of Systems   Pertinent items are noted in HPI.     Objective      /76   Ht 172.7 cm (68\")   Wt 77.1 kg (170 lb)   BMI 25.85 kg/m²     Physical Exam    General:   alert and no distress   Heart: Not performed today   Lungs: Not performed today.   Breast: Not performed today   Neck: na   Abdomen: {Not performed today   CVA: Not performed today   Pelvis: Not performed today   Extremities: Not performed today   Neurologic: negative   Psychiatric: Normal affect, judgement, and mood       Lab Review   Labs: No data reviewed     Imaging   No data reviewed    Assessment & Plan     ASSESSMENT  1. Mood changes    2. Weight gain        PLAN  1. No orders of the defined types were placed in this encounter.      2. Medications prescribed this encounter:        New Medications Ordered This Visit   Medications    Progesterone (Prometrium) 100 MG capsule     " Sig: Take 1 capsule by mouth Daily.     Dispense:  90 capsule     Refill:  1       3. Since she is off her estradiol, no further bleeding US in march showed a normal post menopausal stripe of 2.7 mm, will decrease prometrium back to 100 mg until her follow up in July with Dr. Phan. She plans to follow up in a couple of weeks with her PCP regarding mood changes as well. Denies SI/HI. Follow up sooner with any further bleeding.    Follow up: 2 month(s)    Elizabeth Miller, APRN  5/22/2024

## 2024-07-10 ENCOUNTER — PREP FOR SURGERY (OUTPATIENT)
Dept: OTHER | Facility: HOSPITAL | Age: 69
End: 2024-07-10
Payer: MEDICARE

## 2024-07-10 ENCOUNTER — OFFICE VISIT (OUTPATIENT)
Dept: OBSTETRICS AND GYNECOLOGY | Age: 69
End: 2024-07-10
Payer: MEDICARE

## 2024-07-10 VITALS
BODY MASS INDEX: 26.67 KG/M2 | DIASTOLIC BLOOD PRESSURE: 68 MMHG | SYSTOLIC BLOOD PRESSURE: 120 MMHG | WEIGHT: 176 LBS | HEIGHT: 68 IN

## 2024-07-10 DIAGNOSIS — N85.01 COMPLEX ENDOMETRIAL HYPERPLASIA: Primary | ICD-10-CM

## 2024-07-10 DIAGNOSIS — R93.89 THICKENED ENDOMETRIUM: ICD-10-CM

## 2024-07-10 DIAGNOSIS — N95.0 PMB (POSTMENOPAUSAL BLEEDING): ICD-10-CM

## 2024-07-10 PROCEDURE — 99214 OFFICE O/P EST MOD 30 MIN: CPT | Performed by: OBSTETRICS & GYNECOLOGY

## 2024-07-10 PROCEDURE — 3078F DIAST BP <80 MM HG: CPT | Performed by: OBSTETRICS & GYNECOLOGY

## 2024-07-10 PROCEDURE — 3074F SYST BP LT 130 MM HG: CPT | Performed by: OBSTETRICS & GYNECOLOGY

## 2024-07-10 RX ORDER — SODIUM CHLORIDE 0.9 % (FLUSH) 0.9 %
1-10 SYRINGE (ML) INJECTION AS NEEDED
OUTPATIENT
Start: 2024-07-10

## 2024-07-10 RX ORDER — CLINDAMYCIN PHOSPHATE 900 MG/50ML
900 INJECTION, SOLUTION INTRAVENOUS ONCE
OUTPATIENT
Start: 2024-07-10 | End: 2024-07-10

## 2024-07-10 RX ORDER — SCOLOPAMINE TRANSDERMAL SYSTEM 1 MG/1
1 PATCH, EXTENDED RELEASE TRANSDERMAL CONTINUOUS
OUTPATIENT
Start: 2024-07-10 | End: 2024-07-13

## 2024-07-10 RX ORDER — SODIUM CHLORIDE 9 MG/ML
40 INJECTION, SOLUTION INTRAVENOUS AS NEEDED
OUTPATIENT
Start: 2024-07-10

## 2024-07-10 RX ORDER — SODIUM CHLORIDE 0.9 % (FLUSH) 0.9 %
10 SYRINGE (ML) INJECTION EVERY 12 HOURS SCHEDULED
OUTPATIENT
Start: 2024-07-10

## 2024-07-10 RX ORDER — PHENAZOPYRIDINE HYDROCHLORIDE 200 MG/1
200 TABLET, FILM COATED ORAL ONCE
OUTPATIENT
Start: 2024-07-10 | End: 2024-07-10

## 2024-07-10 NOTE — PROGRESS NOTES
"Subjective     Chief Complaint   Patient presents with    Gynecologic Exam     Follow up endo lining with U/S       Matilde H Sturgeon is a 69 y.o.  whose LMP is No LMP recorded. Patient is postmenopausal. Presents to follow up regarding complex endometrial hyperplasia. This was dx 23 and she was on estrogen and progesterone tx at the time. Estrogen discontinued and increased progesterone with plan for repeat sampling to ensure resolution. Has had not had any further bleeding but complains of some bloating and increase weight gain w the progesterone.  Repeat bx attempted but unable to perform in the office.       No Additional Complaints Reported    The following portions of the patient's history were reviewed and updated as appropriate:vital signs, allergies, current medications, past medical history, past social history, past surgical history, and problem list      Objective      /68   Ht 172.7 cm (68\")   Wt 79.8 kg (176 lb)   BMI 26.76 kg/m²     Physical Exam  Well, no distress  Abdomen soft nontender, no organomegaly noted  Normal but atrophic vagina, the cervix is small and nulliparous appearing, some stenosis noted  On bimanual the uterus is mobile, perhaps very mild scarring from 1 prior     TVUS endo stripe approx 5  mm today, slightly heterogenous    Assessment & Plan     Diagnoses and all orders for this visit:    1. Complex endometrial hyperplasia (Primary)    2. PMB (postmenopausal bleeding)    3. Thickened endometrium      We discussed that she needs repeat sampling to ensure that her complex hyperplasia has resolved.  We also discussed the option of proceeding with hysterectomy to ensure that the hyperplasia has resolved and negate her risk of endometrial cancer.  She desires to proceed with hysterectomy.    [Total laparoscopic hysterectomy]:  Minimally invasive approaches to hysterectomy are reviewed with the patient.     The surgical procedure is discussed with the " "patient in detail.  I discussed the risks of the surgical procedure including,but not limited to the risk of bleeding, infection, and damage to internal organs.  In exceeding rare cases, death has been reported from surgical complications involving hysterectomy.      It is customary with this procedure to remove both fallopian tubes.  More recent researches suggested that fallopian tubes may be the source of a type of cancer that was previously attributed to the ovaries.  As well, it is usual practice to conserve the ovaries outside of significant pathology.     In cases where extensive scar tissue, fibroids, or uncontrolled bleeding is encountered, it may become necessary to convert the procedure to an “open\" laparotomy hysterectomy involving a longer recovery.     The procedure entails very close operative proximity to the bladder and ureters.  There is a risk of injury to these structures.  It is usual practice to inspect the bladder and insure functioning ureters at the conclusion of the procedure using cystoscopy (camera in the bladder).     In addition to routine postoperative instructions provided, all patients are advised that they MUST avoid vaginal penetration for 6-8 weeks postoperative until the upper vaginal cuff is inspected for proper healing.  There is a rare incidence of vaginal cuff separation that can require emergent intervention.        Zeynep Phan MD  7/10/2024             "

## 2024-07-17 ENCOUNTER — TRANSCRIBE ORDERS (OUTPATIENT)
Dept: LAB | Facility: HOSPITAL | Age: 69
End: 2024-07-17
Payer: MEDICARE

## 2024-07-17 ENCOUNTER — LAB (OUTPATIENT)
Dept: LAB | Facility: HOSPITAL | Age: 69
End: 2024-07-17
Payer: MEDICARE

## 2024-07-17 DIAGNOSIS — M21.839: ICD-10-CM

## 2024-07-17 DIAGNOSIS — M21.839: Primary | ICD-10-CM

## 2024-07-17 LAB — CHROMATIN AB SERPL-ACNC: <10 IU/ML (ref 0–14)

## 2024-07-17 PROCEDURE — 36415 COLL VENOUS BLD VENIPUNCTURE: CPT

## 2024-07-17 PROCEDURE — 86431 RHEUMATOID FACTOR QUANT: CPT

## 2024-07-24 ENCOUNTER — TELEPHONE (OUTPATIENT)
Dept: OBSTETRICS AND GYNECOLOGY | Age: 69
End: 2024-07-24
Payer: MEDICARE

## 2024-07-24 DIAGNOSIS — N39.3 STRESS INCONTINENCE IN FEMALE: Primary | ICD-10-CM

## 2024-07-24 NOTE — TELEPHONE ENCOUNTER
"PT STRUGGLING WITH URINARY INCONTINENCE, SHE HAS BEEN DRINKING A LOT MORE WATER ON HER DIET, HAVING TO USE BATHROOM A LOT AND HAVING ACCIDENTS.  ASKING IF YOU CAN \"LIFT HER BLADDER\" AT THE TIME OF HER HYSTERECTOMY.  OR DOES SHE NEED TO SEE UROGYN?    "

## 2024-07-24 NOTE — TELEPHONE ENCOUNTER
She might need a bladder sling and we do not do those, would need to see urogyn and referral placed.    Zeynep Phan MD  7/24/2024  16:07 EDT

## 2024-07-25 NOTE — TELEPHONE ENCOUNTER
Pt aware, referral sent to [facility] and we will try to get her in asap prior to surgery.

## 2024-08-20 ENCOUNTER — TELEPHONE (OUTPATIENT)
Dept: OBSTETRICS AND GYNECOLOGY | Age: 69
End: 2024-08-20
Payer: MEDICARE

## 2024-08-20 NOTE — TELEPHONE ENCOUNTER
"  Caller: Sturgeon, Victoria H \"NAWAF\"    Relationship: Self    Best call back number: [phone]    What is the best time to reach you: ANY    Who are you requesting to speak with (clinical staff, provider,  specific staff member): CLINICAL     What was the call regarding: PT HAS SURGERY ON 9/5/24 AND HAS A FEW QUESTIONS ABOUT RECOVERY     "

## 2024-08-23 ENCOUNTER — TELEPHONE (OUTPATIENT)
Dept: OBSTETRICS AND GYNECOLOGY | Age: 69
End: 2024-08-23
Payer: MEDICARE

## 2024-08-23 NOTE — TELEPHONE ENCOUNTER
"l     Caller: Sturgeon, Victoria H \"NAWAF\"    Relationship to patient: Self    Best call back number: [phone] (home)       Patient is needing: PT IS WANTING TO KNOW WHEN SHE WILL BE ABLE FLY ON AN AIRPLANE AFTER HER SURGERY. SHE WANTS TO GO TO HER DAUGHTERS IN [place].    "

## 2024-08-27 ENCOUNTER — TELEPHONE (OUTPATIENT)
Dept: OBSTETRICS AND GYNECOLOGY | Age: 69
End: 2024-08-27
Payer: MEDICARE

## 2024-08-27 NOTE — TELEPHONE ENCOUNTER
"    Caller: Sturgeon, Victoria H \"NAWAF\"    Relationship to patient: Self    Best call back number: [phone]    Patient is needing: PT IS HAVING PROCEDURE WITH DR. KELLER ON 9/5/2024. PT DAUGHTER IS FLYING IN FROM [place] TO HELP WITH PATIENT AFTER SURGERY. PT IS ASKING HOW LONG WILL HER DAUGHTER NEED TO STAY WITH HER? WHAT IS THE TIMEFRAME FOR RECOVERY AFTER PROCEDURE. ASKING FOR CALL BACK.     SURGERY: TOTAL LAPAROSCOPIC HYSTERECTOMY BILATERAL SALPINGO OOPHORECTOMY         "

## 2024-08-28 ENCOUNTER — TELEPHONE (OUTPATIENT)
Dept: OBSTETRICS AND GYNECOLOGY | Age: 69
End: 2024-08-28

## 2024-08-29 ENCOUNTER — PRE-ADMISSION TESTING (OUTPATIENT)
Dept: PREADMISSION TESTING | Facility: HOSPITAL | Age: 69
End: 2024-08-29
Payer: MEDICARE

## 2024-08-29 VITALS
RESPIRATION RATE: 18 BRPM | WEIGHT: 163.2 LBS | OXYGEN SATURATION: 97 % | DIASTOLIC BLOOD PRESSURE: 78 MMHG | TEMPERATURE: 97.1 F | SYSTOLIC BLOOD PRESSURE: 125 MMHG | HEIGHT: 66 IN | HEART RATE: 83 BPM | BODY MASS INDEX: 26.23 KG/M2

## 2024-08-29 LAB
ABO GROUP BLD: NORMAL
ANION GAP SERPL CALCULATED.3IONS-SCNC: 9.9 MMOL/L (ref 5–15)
BLD GP AB SCN SERPL QL: NEGATIVE
BUN SERPL-MCNC: 10 MG/DL (ref 8–23)
BUN/CREAT SERPL: 15.4 (ref 7–25)
CALCIUM SPEC-SCNC: 9.7 MG/DL (ref 8.6–10.5)
CHLORIDE SERPL-SCNC: 96 MMOL/L (ref 98–107)
CO2 SERPL-SCNC: 24.1 MMOL/L (ref 22–29)
CREAT SERPL-MCNC: 0.65 MG/DL (ref 0.57–1)
DEPRECATED RDW RBC AUTO: 42.4 FL (ref 37–54)
EGFRCR SERPLBLD CKD-EPI 2021: 95.4 ML/MIN/1.73
ERYTHROCYTE [DISTWIDTH] IN BLOOD BY AUTOMATED COUNT: 12.2 % (ref 12.3–15.4)
GLUCOSE SERPL-MCNC: 94 MG/DL (ref 65–99)
HCT VFR BLD AUTO: 40.2 % (ref 34–46.6)
HGB BLD-MCNC: 13.6 G/DL (ref 12–15.9)
MCH RBC QN AUTO: 32.2 PG (ref 26.6–33)
MCHC RBC AUTO-ENTMCNC: 33.8 G/DL (ref 31.5–35.7)
MCV RBC AUTO: 95.3 FL (ref 79–97)
PLATELET # BLD AUTO: 288 10*3/MM3 (ref 140–450)
PMV BLD AUTO: 10 FL (ref 6–12)
POTASSIUM SERPL-SCNC: 4.1 MMOL/L (ref 3.5–5.2)
RBC # BLD AUTO: 4.22 10*6/MM3 (ref 3.77–5.28)
RH BLD: POSITIVE
SODIUM SERPL-SCNC: 130 MMOL/L (ref 136–145)
T&S EXPIRATION DATE: NORMAL
WBC NRBC COR # BLD AUTO: 6.61 10*3/MM3 (ref 3.4–10.8)

## 2024-08-29 PROCEDURE — 93010 ELECTROCARDIOGRAM REPORT: CPT | Performed by: INTERNAL MEDICINE

## 2024-08-29 PROCEDURE — 93005 ELECTROCARDIOGRAM TRACING: CPT

## 2024-08-29 PROCEDURE — 36415 COLL VENOUS BLD VENIPUNCTURE: CPT

## 2024-08-29 PROCEDURE — 86850 RBC ANTIBODY SCREEN: CPT | Performed by: OBSTETRICS & GYNECOLOGY

## 2024-08-29 PROCEDURE — 85027 COMPLETE CBC AUTOMATED: CPT

## 2024-08-29 PROCEDURE — 86900 BLOOD TYPING SEROLOGIC ABO: CPT | Performed by: OBSTETRICS & GYNECOLOGY

## 2024-08-29 PROCEDURE — 86901 BLOOD TYPING SEROLOGIC RH(D): CPT | Performed by: OBSTETRICS & GYNECOLOGY

## 2024-08-29 PROCEDURE — 80048 BASIC METABOLIC PNL TOTAL CA: CPT

## 2024-08-29 RX ORDER — METHOCARBAMOL 500 MG/1
500 TABLET, FILM COATED ORAL 4 TIMES DAILY PRN
COMMUNITY

## 2024-08-29 RX ORDER — ALBUTEROL SULFATE 90 UG/1
2 AEROSOL, METERED RESPIRATORY (INHALATION) EVERY 4 HOURS PRN
COMMUNITY

## 2024-08-29 RX ORDER — OMEPRAZOLE 40 MG/1
40 CAPSULE, DELAYED RELEASE ORAL DAILY
COMMUNITY

## 2024-08-29 RX ORDER — ACETAMINOPHEN 325 MG/1
650 TABLET ORAL EVERY 6 HOURS PRN
COMMUNITY

## 2024-08-29 RX ORDER — EPINEPHRINE 0.3 MG/.3ML
0.3 INJECTION SUBCUTANEOUS AS NEEDED
COMMUNITY

## 2024-08-29 RX ORDER — TROSPIUM CHLORIDE ER 60 MG/1
60 CAPSULE ORAL EVERY MORNING
COMMUNITY
Start: 2024-08-02

## 2024-08-29 RX ORDER — MELOXICAM 7.5 MG/1
7.5 TABLET ORAL NIGHTLY PRN
COMMUNITY
End: 2024-09-05 | Stop reason: HOSPADM

## 2024-08-29 RX ORDER — MONTELUKAST SODIUM 10 MG/1
10 TABLET ORAL NIGHTLY
COMMUNITY

## 2024-08-29 RX ORDER — FOLIC ACID 1 MG/1
1 TABLET ORAL DAILY
COMMUNITY
End: 2024-09-05 | Stop reason: HOSPADM

## 2024-08-29 NOTE — DISCHARGE INSTRUCTIONS
Take the following medications the morning of surgery:  DULOXETINE, ALBUTEROL, CARVEDILOL, OMEPRAZOLE, TROSPIUM    If you are on prescription narcotic pain medication to control your pain you may also take that medication the morning of surgery.      General Instructions:     Do not eat solid food after midnight the night before surgery.  Clear liquids day of surgery are allowed but must be stopped at least two hours before your hospital arrival time.       Allowed clear liquids      Water, sodas, and tea or coffee with no cream or milk added.       12 to 20 ounces of a clear liquid that contains carbohydrates is recommended.  If non-diabetic, have Gatorade or Powerade.  If diabetic, have G2 or Powerade Zero.     Do not have liquids red in color.  Do not consume chicken, beef, pork or vegetable broth or bouillon cubes of any variety as they are not considered clear liquids and are not allowed.      Infants may have breast milk up to four hours before surgery.  Infants drinking formula may drink formula up to six hours before surgery.   Patients who avoid smoking, chewing tobacco and alcohol for 4 weeks prior to surgery have a reduced risk of post-operative complications.  Quit smoking as many days before surgery as you can.  Do not smoke, use chewing tobacco or drink alcohol the day of surgery.   If applicable bring your C-PAP/ BI-PAP machine in with you to preop day of surgery.  Bring any papers given to you in the doctor’s office.  Wear clean comfortable clothes.  Do not wear contact lenses, false eyelashes or make-up.  Bring a case for your glasses.   Bring crutches or walker if applicable.  Remove all piercings.  Leave jewelry and any other valuables at home.  Hair extensions with metal clips must be removed prior to surgery.  The Pre-Admission Testing nurse will instruct you to bring medications if unable to obtain an accurate list in Pre-Admission Testing.        If you were given a blood bank ID arm band  remember to bring it with you the day of surgery.    Preventing a Surgical Site Infection:  For 2 to 3 days before surgery, avoid shaving with a razor because the razor can irritate skin and make it easier to develop an infection.    Any areas of open skin can increase the risk of a post-operative wound infection by allowing bacteria to enter and travel throughout the body.  Notify your surgeon if you have any skin wounds / rashes even if it is not near the expected surgical site.  The area will need assessed to determine if surgery should be delayed until it is healed.  The night prior to surgery shower using a fresh bar of anti-bacterial soap (such as Dial) and clean washcloth.  Sleep in a clean bed with clean clothing.  Do not allow pets to sleep with you.  Shower on the morning of surgery using a fresh bar of anti-bacterial soap (such as Dial) and clean washcloth.  Dry with a clean towel and dress in clean clothing.  Ask your surgeon if you will be receiving antibiotics prior to surgery.  Make sure you, your family, and all healthcare providers clean their hands with soap and water or an alcohol based hand  before caring for you or your wound.    Day of surgery:  Your arrival time is approximately two hours before your scheduled surgery time.  Please note if you have an early arrival time the surgery doors do not open before 5:00 AM.  Upon arrival, a Pre-op nurse and Anesthesiologist will review your health history, obtain vital signs, and answer questions you may have.  The only belongings needed at this time will be a list of your home medications and if applicable your C-PAP/BI-PAP machine.  A Pre-op nurse will start an IV and you may receive medication in preparation for surgery, including something to help you relax.     Please be aware that surgery does come with discomfort.  We want to make every effort to control your discomfort so please discuss any uncontrolled symptoms with your nurse.   Your  doctor will most likely have prescribed pain medications.      If you are going home after surgery you will receive individualized written care instructions before being discharged.  A responsible adult must drive you to and from the hospital on the day of your surgery and ideally stay with you through the night.   .  Discharge prescriptions can be filled by the hospital pharmacy during regular pharmacy hours.  If you are having surgery late in the day/evening your prescription may be e-prescribed to your pharmacy.  Please verify your pharmacy hours or chose a 24 hour pharmacy to avoid not having access to your prescription because your pharmacy has closed for the day.    If you are staying overnight following surgery, you will be transported to your hospital room following the recovery period.  [facility] has all private rooms.    If you have any questions please call Pre-Admission Testing at [phone].  Deductibles and co-payments are collected on the day of service. Please be prepared to pay the required co-pay, deductible or deposit on the day of service as defined by your plan.    Call your surgeon immediately if you experience any of the following symptoms:  Sore Throat  Shortness of Breath or difficulty breathing  Cough  Chills  Body soreness or muscle pain  Headache  Fever  New loss of taste or smell  Do not arrive for your surgery ill.  Your procedure will need to be rescheduled to another time.  You will need to call your physician before the day of surgery to avoid any unnecessary exposure to hospital staff as well as other patients.    CHLORHEXIDINE CLOTH INSTRUCTIONS  The morning of surgery follow these instructions using the Chlorhexidine cloths you've been given.  These steps reduce bacteria on the body.  Do not use the cloths near your eyes, ears mouth, genitalia or on open wounds.  Throw the cloths away after use but do not try to flush them down a toilet.      Open and remove one  cloth at a time from the package.    Leave the cloth unfolded and begin the bathing.  Massage the skin with the cloths using gentle pressure to remove bacteria.  Do not scrub harshly.   Follow the steps below with one 2% CHG cloth per area (6 total cloths).  One cloth for neck, shoulders and chest.  One cloth for both arms, hands, fingers and underarms (do underarms last).  One cloth for the abdomen followed by groin.  One cloth for right leg and foot including between the toes.  One cloth for left leg and foot including between the toes.  The last cloth is to be used for the back of the neck, back and buttocks.    Allow the CHG to air dry 3 minutes on the skin which will give it time to work and decrease the chance of irritation.  The skin may feel sticky until it is dry.  Do not rinse with water or any other liquid or you will lose the beneficial effects of the CHG.  If mild skin irritation occurs, do rinse the skin to remove the CHG.  Report this to the nurse at time of admission.  Do not apply lotions, creams, ointments, deodorants or perfumes after using the clothes. Dress in clean clothes before coming to the hospital.

## 2024-08-30 ENCOUNTER — TELEPHONE (OUTPATIENT)
Dept: OBSTETRICS AND GYNECOLOGY | Age: 69
End: 2024-08-30
Payer: MEDICARE

## 2024-08-30 NOTE — TELEPHONE ENCOUNTER
"  Caller: Sturgeon, Victoria H \"NAWAF\"    Relationship: Self    Best call back number: [phone]     What is the best time to reach you: CALL ANYTIME, IT IS OKAY TO LVM.    PATIENT IS REQUESTING TO SPEAK WITH CHANTELL IN REGARDS TO A GIFT FOR .   "

## 2024-08-31 LAB
QT INTERVAL: 396 MS
QTC INTERVAL: 452 MS

## 2024-09-03 RX ORDER — PROGESTERONE 100 MG/1
100 CAPSULE ORAL DAILY
Qty: 90 CAPSULE | Refills: 1 | Status: SHIPPED | OUTPATIENT
Start: 2024-09-03

## 2024-09-05 ENCOUNTER — ANESTHESIA (OUTPATIENT)
Dept: PERIOP | Facility: HOSPITAL | Age: 69
End: 2024-09-05
Payer: MEDICARE

## 2024-09-05 ENCOUNTER — HOSPITAL ENCOUNTER (OUTPATIENT)
Facility: HOSPITAL | Age: 69
Setting detail: HOSPITAL OUTPATIENT SURGERY
Discharge: HOME | End: 2024-09-05
Attending: OBSTETRICS & GYNECOLOGY | Admitting: OBSTETRICS & GYNECOLOGY
Payer: MEDICARE

## 2024-09-05 ENCOUNTER — ANESTHESIA EVENT (OUTPATIENT)
Dept: PERIOP | Facility: HOSPITAL | Age: 69
End: 2024-09-05
Payer: MEDICARE

## 2024-09-05 VITALS
HEART RATE: 87 BPM | TEMPERATURE: 97.7 F | OXYGEN SATURATION: 93 % | DIASTOLIC BLOOD PRESSURE: 64 MMHG | SYSTOLIC BLOOD PRESSURE: 106 MMHG | RESPIRATION RATE: 16 BRPM

## 2024-09-05 DIAGNOSIS — Z90.710 S/P LAPAROSCOPIC HYSTERECTOMY: Primary | ICD-10-CM

## 2024-09-05 DIAGNOSIS — N85.01 COMPLEX ENDOMETRIAL HYPERPLASIA: ICD-10-CM

## 2024-09-05 PROCEDURE — 25810000003 LACTATED RINGERS PER 1000 ML: Performed by: ANESTHESIOLOGY

## 2024-09-05 PROCEDURE — 88341 IMHCHEM/IMCYTCHM EA ADD ANTB: CPT | Performed by: OBSTETRICS & GYNECOLOGY

## 2024-09-05 PROCEDURE — 25010000002 SUGAMMADEX 200 MG/2ML SOLUTION: Performed by: NURSE ANESTHETIST, CERTIFIED REGISTERED

## 2024-09-05 PROCEDURE — 25010000002 ONDANSETRON PER 1 MG: Performed by: NURSE ANESTHETIST, CERTIFIED REGISTERED

## 2024-09-05 PROCEDURE — 88309 TISSUE EXAM BY PATHOLOGIST: CPT | Performed by: OBSTETRICS & GYNECOLOGY

## 2024-09-05 PROCEDURE — 25010000002 GENTAMICIN PER 80 MG: Performed by: OBSTETRICS & GYNECOLOGY

## 2024-09-05 PROCEDURE — 25010000002 HYDROMORPHONE PER 4 MG: Performed by: NURSE ANESTHETIST, CERTIFIED REGISTERED

## 2024-09-05 PROCEDURE — 25010000002 FENTANYL CITRATE (PF) 50 MCG/ML SOLUTION: Performed by: NURSE ANESTHETIST, CERTIFIED REGISTERED

## 2024-09-05 PROCEDURE — 25010000002 CLINDAMYCIN 900 MG/50ML SOLUTION: Performed by: OBSTETRICS & GYNECOLOGY

## 2024-09-05 PROCEDURE — 25810000003 SODIUM CHLORIDE PER 500 ML: Performed by: OBSTETRICS & GYNECOLOGY

## 2024-09-05 PROCEDURE — 58571 TLH W/T/O 250 G OR LESS: CPT | Performed by: OBSTETRICS & GYNECOLOGY

## 2024-09-05 PROCEDURE — 25010000002 PROPOFOL 200 MG/20ML EMULSION: Performed by: NURSE ANESTHETIST, CERTIFIED REGISTERED

## 2024-09-05 PROCEDURE — 25010000002 MIDAZOLAM PER 1 MG: Performed by: ANESTHESIOLOGY

## 2024-09-05 PROCEDURE — 88342 IMHCHEM/IMCYTCHM 1ST ANTB: CPT | Performed by: OBSTETRICS & GYNECOLOGY

## 2024-09-05 PROCEDURE — 25010000002 DEXAMETHASONE SODIUM PHOSPHATE 20 MG/5ML SOLUTION: Performed by: NURSE ANESTHETIST, CERTIFIED REGISTERED

## 2024-09-05 DEVICE — FLOSEAL WITH RECOTHROM - 5ML
Type: IMPLANTABLE DEVICE | Site: ABDOMEN | Status: FUNCTIONAL
Brand: FLOSEAL HEMOSTATIC MATRIX

## 2024-09-05 DEVICE — ABSORBABLE RELOAD
Type: IMPLANTABLE DEVICE | Site: ABDOMEN | Status: FUNCTIONAL
Brand: V-LOC 180

## 2024-09-05 RX ORDER — SODIUM CHLORIDE 0.9 % (FLUSH) 0.9 %
1-10 SYRINGE (ML) INJECTION AS NEEDED
Status: DISCONTINUED | OUTPATIENT
Start: 2024-09-05 | End: 2024-09-05 | Stop reason: HOSPADM

## 2024-09-05 RX ORDER — OXYCODONE AND ACETAMINOPHEN 7.5; 325 MG/1; MG/1
1 TABLET ORAL EVERY 4 HOURS PRN
Status: DISCONTINUED | OUTPATIENT
Start: 2024-09-05 | End: 2024-09-05 | Stop reason: HOSPADM

## 2024-09-05 RX ORDER — FLUMAZENIL 0.1 MG/ML
0.2 INJECTION INTRAVENOUS AS NEEDED
Status: DISCONTINUED | OUTPATIENT
Start: 2024-09-05 | End: 2024-09-05 | Stop reason: HOSPADM

## 2024-09-05 RX ORDER — DEXAMETHASONE SODIUM PHOSPHATE 4 MG/ML
INJECTION, SOLUTION INTRA-ARTICULAR; INTRALESIONAL; INTRAMUSCULAR; INTRAVENOUS; SOFT TISSUE AS NEEDED
Status: DISCONTINUED | OUTPATIENT
Start: 2024-09-05 | End: 2024-09-05 | Stop reason: SURG

## 2024-09-05 RX ORDER — FENTANYL CITRATE 50 UG/ML
50 INJECTION, SOLUTION INTRAMUSCULAR; INTRAVENOUS
Status: DISCONTINUED | OUTPATIENT
Start: 2024-09-05 | End: 2024-09-05 | Stop reason: HOSPADM

## 2024-09-05 RX ORDER — ONDANSETRON 2 MG/ML
INJECTION INTRAMUSCULAR; INTRAVENOUS AS NEEDED
Status: DISCONTINUED | OUTPATIENT
Start: 2024-09-05 | End: 2024-09-05 | Stop reason: SURG

## 2024-09-05 RX ORDER — EPHEDRINE SULFATE 50 MG/ML
5 INJECTION, SOLUTION INTRAVENOUS ONCE AS NEEDED
Status: DISCONTINUED | OUTPATIENT
Start: 2024-09-05 | End: 2024-09-05 | Stop reason: HOSPADM

## 2024-09-05 RX ORDER — BUPIVACAINE HYDROCHLORIDE AND EPINEPHRINE 5; 5 MG/ML; UG/ML
INJECTION, SOLUTION EPIDURAL; INTRACAUDAL; PERINEURAL AS NEEDED
Status: DISCONTINUED | OUTPATIENT
Start: 2024-09-05 | End: 2024-09-05 | Stop reason: HOSPADM

## 2024-09-05 RX ORDER — HYDROMORPHONE HYDROCHLORIDE 1 MG/ML
0.5 INJECTION, SOLUTION INTRAMUSCULAR; INTRAVENOUS; SUBCUTANEOUS
Status: DISCONTINUED | OUTPATIENT
Start: 2024-09-05 | End: 2024-09-05 | Stop reason: HOSPADM

## 2024-09-05 RX ORDER — SODIUM CHLORIDE 9 MG/ML
40 INJECTION, SOLUTION INTRAVENOUS AS NEEDED
Status: DISCONTINUED | OUTPATIENT
Start: 2024-09-05 | End: 2024-09-05 | Stop reason: HOSPADM

## 2024-09-05 RX ORDER — SODIUM CHLORIDE 0.9 % (FLUSH) 0.9 %
10 SYRINGE (ML) INJECTION EVERY 12 HOURS SCHEDULED
Status: DISCONTINUED | OUTPATIENT
Start: 2024-09-05 | End: 2024-09-05 | Stop reason: HOSPADM

## 2024-09-05 RX ORDER — LIDOCAINE HYDROCHLORIDE 20 MG/ML
INJECTION, SOLUTION INFILTRATION; PERINEURAL AS NEEDED
Status: DISCONTINUED | OUTPATIENT
Start: 2024-09-05 | End: 2024-09-05 | Stop reason: SURG

## 2024-09-05 RX ORDER — PROMETHAZINE HYDROCHLORIDE 25 MG/1
25 SUPPOSITORY RECTAL ONCE AS NEEDED
Status: DISCONTINUED | OUTPATIENT
Start: 2024-09-05 | End: 2024-09-05 | Stop reason: HOSPADM

## 2024-09-05 RX ORDER — ONDANSETRON 2 MG/ML
4 INJECTION INTRAMUSCULAR; INTRAVENOUS ONCE AS NEEDED
Status: DISCONTINUED | OUTPATIENT
Start: 2024-09-05 | End: 2024-09-05 | Stop reason: HOSPADM

## 2024-09-05 RX ORDER — DROPERIDOL 2.5 MG/ML
0.62 INJECTION, SOLUTION INTRAMUSCULAR; INTRAVENOUS
Status: DISCONTINUED | OUTPATIENT
Start: 2024-09-05 | End: 2024-09-05 | Stop reason: HOSPADM

## 2024-09-05 RX ORDER — PHENAZOPYRIDINE HYDROCHLORIDE 200 MG/1
200 TABLET, FILM COATED ORAL ONCE
Status: COMPLETED | OUTPATIENT
Start: 2024-09-05 | End: 2024-09-05

## 2024-09-05 RX ORDER — IBUPROFEN 600 MG/1
600 TABLET, FILM COATED ORAL EVERY 8 HOURS PRN
Qty: 40 TABLET | Refills: 1 | Status: SHIPPED | OUTPATIENT
Start: 2024-09-05

## 2024-09-05 RX ORDER — SODIUM CHLORIDE, SODIUM LACTATE, POTASSIUM CHLORIDE, CALCIUM CHLORIDE 600; 310; 30; 20 MG/100ML; MG/100ML; MG/100ML; MG/100ML
9 INJECTION, SOLUTION INTRAVENOUS CONTINUOUS PRN
Status: DISCONTINUED | OUTPATIENT
Start: 2024-09-05 | End: 2024-09-05 | Stop reason: HOSPADM

## 2024-09-05 RX ORDER — HYDROCODONE BITARTRATE AND ACETAMINOPHEN 5; 325 MG/1; MG/1
1 TABLET ORAL ONCE AS NEEDED
Status: COMPLETED | OUTPATIENT
Start: 2024-09-05 | End: 2024-09-05

## 2024-09-05 RX ORDER — DOCUSATE SODIUM 100 MG/1
100 CAPSULE, LIQUID FILLED ORAL 2 TIMES DAILY
Qty: 28 CAPSULE | Refills: 0 | Status: SHIPPED | OUTPATIENT
Start: 2024-09-05

## 2024-09-05 RX ORDER — OXYCODONE AND ACETAMINOPHEN 5; 325 MG/1; MG/1
1 TABLET ORAL EVERY 4 HOURS PRN
Qty: 20 TABLET | Refills: 0 | Status: SHIPPED | OUTPATIENT
Start: 2024-09-05

## 2024-09-05 RX ORDER — IPRATROPIUM BROMIDE AND ALBUTEROL SULFATE 2.5; .5 MG/3ML; MG/3ML
3 SOLUTION RESPIRATORY (INHALATION) ONCE AS NEEDED
Status: DISCONTINUED | OUTPATIENT
Start: 2024-09-05 | End: 2024-09-05 | Stop reason: HOSPADM

## 2024-09-05 RX ORDER — ROCURONIUM BROMIDE 10 MG/ML
INJECTION, SOLUTION INTRAVENOUS AS NEEDED
Status: DISCONTINUED | OUTPATIENT
Start: 2024-09-05 | End: 2024-09-05 | Stop reason: SURG

## 2024-09-05 RX ORDER — PROMETHAZINE HYDROCHLORIDE 25 MG/1
25 TABLET ORAL ONCE AS NEEDED
Status: DISCONTINUED | OUTPATIENT
Start: 2024-09-05 | End: 2024-09-05 | Stop reason: HOSPADM

## 2024-09-05 RX ORDER — MIDAZOLAM HYDROCHLORIDE 1 MG/ML
0.5 INJECTION INTRAMUSCULAR; INTRAVENOUS
Status: DISCONTINUED | OUTPATIENT
Start: 2024-09-05 | End: 2024-09-05 | Stop reason: HOSPADM

## 2024-09-05 RX ORDER — NALOXONE HCL 0.4 MG/ML
0.2 VIAL (ML) INJECTION AS NEEDED
Status: DISCONTINUED | OUTPATIENT
Start: 2024-09-05 | End: 2024-09-05 | Stop reason: HOSPADM

## 2024-09-05 RX ORDER — PROPOFOL 10 MG/ML
INJECTION, EMULSION INTRAVENOUS AS NEEDED
Status: DISCONTINUED | OUTPATIENT
Start: 2024-09-05 | End: 2024-09-05 | Stop reason: SURG

## 2024-09-05 RX ORDER — HYDRALAZINE HYDROCHLORIDE 20 MG/ML
5 INJECTION INTRAMUSCULAR; INTRAVENOUS
Status: DISCONTINUED | OUTPATIENT
Start: 2024-09-05 | End: 2024-09-05 | Stop reason: HOSPADM

## 2024-09-05 RX ORDER — LABETALOL HYDROCHLORIDE 5 MG/ML
5 INJECTION, SOLUTION INTRAVENOUS
Status: DISCONTINUED | OUTPATIENT
Start: 2024-09-05 | End: 2024-09-05 | Stop reason: HOSPADM

## 2024-09-05 RX ORDER — SODIUM CHLORIDE 0.9 % (FLUSH) 0.9 %
10 SYRINGE (ML) INJECTION AS NEEDED
Status: DISCONTINUED | OUTPATIENT
Start: 2024-09-05 | End: 2024-09-05 | Stop reason: HOSPADM

## 2024-09-05 RX ORDER — SCOLOPAMINE TRANSDERMAL SYSTEM 1 MG/1
1 PATCH, EXTENDED RELEASE TRANSDERMAL CONTINUOUS
Status: DISCONTINUED | OUTPATIENT
Start: 2024-09-05 | End: 2024-09-05 | Stop reason: HOSPADM

## 2024-09-05 RX ORDER — CLINDAMYCIN PHOSPHATE 900 MG/50ML
900 INJECTION, SOLUTION INTRAVENOUS ONCE
Status: COMPLETED | OUTPATIENT
Start: 2024-09-05 | End: 2024-09-05

## 2024-09-05 RX ORDER — SODIUM CHLORIDE 9 MG/ML
INJECTION, SOLUTION INTRAVENOUS AS NEEDED
Status: DISCONTINUED | OUTPATIENT
Start: 2024-09-05 | End: 2024-09-05 | Stop reason: HOSPADM

## 2024-09-05 RX ORDER — DIPHENHYDRAMINE HYDROCHLORIDE 50 MG/ML
12.5 INJECTION INTRAMUSCULAR; INTRAVENOUS
Status: DISCONTINUED | OUTPATIENT
Start: 2024-09-05 | End: 2024-09-05 | Stop reason: HOSPADM

## 2024-09-05 RX ADMIN — SCOPALAMINE 1 PATCH: 1 PATCH, EXTENDED RELEASE TRANSDERMAL at 09:56

## 2024-09-05 RX ADMIN — PROPOFOL 25 MCG/KG/MIN: 10 INJECTION, EMULSION INTRAVENOUS at 13:32

## 2024-09-05 RX ADMIN — MIDAZOLAM 0.5 MG: 1 INJECTION INTRAMUSCULAR; INTRAVENOUS at 09:56

## 2024-09-05 RX ADMIN — ROCURONIUM BROMIDE 50 MG: 10 INJECTION, SOLUTION INTRAVENOUS at 13:25

## 2024-09-05 RX ADMIN — FENTANYL CITRATE 50 MCG: 50 INJECTION, SOLUTION INTRAMUSCULAR; INTRAVENOUS at 15:42

## 2024-09-05 RX ADMIN — SODIUM CHLORIDE, POTASSIUM CHLORIDE, SODIUM LACTATE AND CALCIUM CHLORIDE: 600; 310; 30; 20 INJECTION, SOLUTION INTRAVENOUS at 13:17

## 2024-09-05 RX ADMIN — LIDOCAINE HYDROCHLORIDE 100 MG: 20 INJECTION, SOLUTION INFILTRATION; PERINEURAL at 13:23

## 2024-09-05 RX ADMIN — Medication 20 MG: at 13:25

## 2024-09-05 RX ADMIN — DEXAMETHASONE SODIUM PHOSPHATE 10 MG: 4 INJECTION, SOLUTION INTRAMUSCULAR; INTRAVENOUS at 13:28

## 2024-09-05 RX ADMIN — SODIUM CHLORIDE, POTASSIUM CHLORIDE, SODIUM LACTATE AND CALCIUM CHLORIDE: 600; 310; 30; 20 INJECTION, SOLUTION INTRAVENOUS at 14:42

## 2024-09-05 RX ADMIN — HYDROCODONE BITARTRATE AND ACETAMINOPHEN 1 TABLET: 5; 325 TABLET ORAL at 15:56

## 2024-09-05 RX ADMIN — HYDROMORPHONE HYDROCHLORIDE 0.5 MG: 1 INJECTION, SOLUTION INTRAMUSCULAR; INTRAVENOUS; SUBCUTANEOUS at 16:31

## 2024-09-05 RX ADMIN — CLINDAMYCIN PHOSPHATE 900 MG: 900 INJECTION, SOLUTION INTRAVENOUS at 12:37

## 2024-09-05 RX ADMIN — PROPOFOL 150 MG: 10 INJECTION, EMULSION INTRAVENOUS at 13:23

## 2024-09-05 RX ADMIN — PHENAZOPYRIDINE 200 MG: 200 TABLET ORAL at 09:25

## 2024-09-05 RX ADMIN — GENTAMICIN SULFATE 350 MG: 40 INJECTION, SOLUTION INTRAMUSCULAR; INTRAVENOUS at 13:05

## 2024-09-05 RX ADMIN — ONDANSETRON 4 MG: 2 INJECTION INTRAMUSCULAR; INTRAVENOUS at 14:29

## 2024-09-05 RX ADMIN — Medication 30 MG: at 14:28

## 2024-09-05 RX ADMIN — FENTANYL CITRATE 50 MCG: 50 INJECTION, SOLUTION INTRAMUSCULAR; INTRAVENOUS at 16:13

## 2024-09-05 RX ADMIN — SUGAMMADEX 200 MG: 100 INJECTION, SOLUTION INTRAVENOUS at 14:43

## 2024-09-05 RX ADMIN — HYDROMORPHONE HYDROCHLORIDE 0.5 MG: 1 INJECTION, SOLUTION INTRAMUSCULAR; INTRAVENOUS; SUBCUTANEOUS at 17:26

## 2024-09-05 RX ADMIN — HYDROMORPHONE HYDROCHLORIDE 0.5 MG: 1 INJECTION, SOLUTION INTRAMUSCULAR; INTRAVENOUS; SUBCUTANEOUS at 15:17

## 2024-09-05 RX ADMIN — FENTANYL CITRATE 50 MCG: 50 INJECTION, SOLUTION INTRAMUSCULAR; INTRAVENOUS at 15:11

## 2024-09-05 RX ADMIN — FENTANYL CITRATE 50 MCG: 50 INJECTION, SOLUTION INTRAMUSCULAR; INTRAVENOUS at 15:27
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
## 2024-09-05 NOTE — OP NOTE
GYN Operative Note  Subjective     Date of Service:  09/05/24  Time of Service:  14:57 EDT    Surgical Staff: Surgeons and Role:     * Zeynep Phan MD - Primary     * Luanne Amaya MD - Assisting-her assistance was necessary for the completion of the surgery and duties included trocar placement, retraction, pedicle ligation   Pre-operative diagnosis(es): Pre-Op Diagnosis Codes:     * Complex endometrial hyperplasia [N85.01]     Post-operative diagnosis(es): Post-Op Diagnosis Codes:     * Complex endometrial hyperplasia [N85.01]   Procedure(s): Procedure(s):  TOTAL LAPAROSCOPIC HYSTERECTOMY BILATERAL SALPINGO OOPHORECTOMY     Antibiotics: clindamycin (Cleocin) and gentamycin (Garamycin) ordered on call to OR     Anesthesia: Type: General  ASA:  III     Objective      Operative findings: Uterus is small and normal-appearing.  The ovaries were atrophic and normal as well.   Operative Note DESCRIPTION OF PROCEDURE: The patient was taken to the operating room and placed in supine position. General anesthesia was administered. The surgical time-out is completed for the procedure.  The patient's legs were positioned in Ajay stirrups and her arms were tucked at her side and wrapped in protective foam. She was prepped and draped in the usual sterile fashion.     Attention was turned vaginally. A weighted speculum was inserted. The cervix was grasped anteriorly with a single-tooth tenaculum. Anchoring stitches were placed in the cervix anteriorly and posteriorly with 0 Vicryl. The cervix was dilated to 12-French. Uterus sounds to 6 cm. The CARIDAD device was then inserted into the uterus using the anchoring stitches threaded through the cup to seat the cervix within the CARIDAD cup. The intrauterine balloon was inflated and attention was turned abdominally.     A small vertical infraumbilical skin incision was made with the knife. The Veress needle was placed through the incision. The abdomen is insufflated with CO2  gas with normal pressures noted while insufflating. When an adequate pneumoperitoneum was achieved, a 5 mm bladeless trocar was inserted through the umbilical incision and the scope immediately thereafter. An initial survey of the abdomen provides no evidence of significant pathology. The patient was placed in Trendelenburg. Accessory ports are then inserted under direct visualization after appropriate skin incisions were made in the left and right lower quadrant, 5 and 10 mm.  The incision sites are transilluminated before placement to avoid injury to the underlying vasculature.  Both trocars are atraumatic and inserted under direct visualization. Bowel was than manipulated cephalad to expose the pelvis.     Attention was first turned to the left infundibulopelvic ligament.  The infundibulopelvic ligament is then clamped, cauterized, and transected in serial bites along its length up to the level of the round ligament. The round ligament is clamped, cauterized, and transected in serial bites down to a level just above the uterine vessels on the left. The anterior reflection of the peritoneum was then opened and incised with the Harmonic scalpel to create a bladder flap. Attention is turned to the contralateral side and in an identical fashion, the infundibulopelvic ligament was clamped, cauterized, and transected along its length as is the round ligament down to the level just above the uterine vessels. The peritoneal incision is connected with the contralateral side and then the bladder is dissected inferiorly and the dissection of the bladder flap was completed to expose the ring of the CARIDAD device. The uterine vessels on the right were then clamped, cauterized, and transected using vascular mode of the Harmonic scalpel in serial bites. Identical procedures repeated on the contralateral side.     Anterior colpotomy was then performed after the vaginal occluder balloon is inflated. The colpotomy is carried out  circumferentially with the active blade of the Harmonic scalpel staying on the upper region of the CARIDAD ring. When the transsection is completed, the specimen is then delivered vaginally without any difficulty. The operative site is copiously irrigated, inspected, and noted to be hemostatic.  The pneumoperitoneum is maintained by placing an asepto-bulb in the vagina.   The cuff was then closed with 2-0 V-Loc suture using the Ethicon Endo Stitch device. The cuff is closed posterior to anterior and left to right in a running fashion. Upon reaching the right margin, the stitch is threaded back 2 bites to anchor the leading stitch. Careful attention is made during the closure to avoid the bladder flap with the placement of the anterior stitch in the colon posteriorly. The cuff is noted to be hemostatic upon closure.     Cystoscopy was not performed as the surgery was straightforward and the ureters were able to be visualized easily.  There was a small amount of  oozing from the right side of the vaginal cuff and Floseal was placed.  Hemostasis was observed.  The abdomen was thereafter deflated. Valsalva is given by anesthesia to displace all remaining air. All ports are removed. Skin incisions are closed with 4-0 monocryl in a subcuticular fashion. The sites are injected with 0.5% Marcaine with epinephrine.    The Asepto bulb was removed from the vagina.  Upon inspection there was a small ulceration of the vaginal mucosa in the posterior fourchette.  This was reapproximated using 3-0 Vicryl.     The patient is awakened and taken to the recovery room in stable condition.  Her daughter was updated on the surgery.   Specimens removed: ID Type Source Tests Collected by Time   A : UTERUS, CERVIX, BILATERAL FALLOPIAN TUBES, BILATERAL OVARIES Tissue Uterus with Cervix, Bilateral Tubes and Ovaries TISSUE PATHOLOGY EXAM Zeynep Phan MD 9/5/2024 2520      Fluid Intake: 1000 mL   Output: Documented Output  Est. Blood Loss 50  mL  Urine Output 350 mL    I/O this shift:  In: 1150 [I.V.:1000; IV Piggyback:150]  Out: 400 [Urine:350; Blood:50]     Blood products used: No   Drains: Urethral Catheter Silicone 16 Fr. (Active)       [REMOVED] NG/OG Tube Orogastric 18 Fr Center mouth (Removed)      Implant Information: Implant Name Type Inv. Item Serial No.  Lot No. LRB No. Used Action   RELOAD SUT ENDOSTITCH [id number] ABS SZ2/0 20CM GRN - [id number] Implant RELOAD SUT ENDOSTITCH [id number] ABS SZ2/0 20CM GRN  [facility] [id number]  1 Implanted   KT SEAL HEMOS ABS FLOSEAL MATRX 1.5/FAST/PREP 5000/IU 5ML - [id number] Implant KT SEAL HEMOS ABS FLOSEAL MATRX 1.5/FAST/PREP 5000/IU 5ML  [facility] [id number]  1 Implanted      Complications: None   Condition: stable   Disposition: to PACU and then discharge home when recovered             Zeynep Phan MD  09/05/24  14:57 EDT

## 2024-09-05 NOTE — ANESTHESIA PREPROCEDURE EVALUATION
Anesthesia Evaluation     Patient summary reviewed and Nursing notes reviewed   NPO Solid Status: > 8 hours  NPO Liquid Status: > 2 hours           Airway   Mallampati: II  TM distance: >3 FB  Neck ROM: full  No difficulty expected  Dental - normal exam     Pulmonary - normal exam   (+) asthma,  Cardiovascular - normal exam  Exercise tolerance: good (4-7 METS)    ECG reviewed  Patient on routine beta blocker and Beta blocker given within 24 hours of surgery    (+) hypertension, hyperlipidemia    ROS comment: ECHO 2021  · Calculated left ventricular EF = 59% Estimated left ventricular EF was in agreement with the calculated left ventricular EF. Left ventricular systolic function is normal.  · Left ventricular diastolic function was normal.  · Mild mitral valve regurgitation is present with a centrally-directed jet noted.  · Mild tricuspid valve regurgitation is present.  · Estimated right ventricular systolic pressure from tricuspid regurgitation is normal (<35 mmHg).      Neuro/Psych  (+) psychiatric history Anxiety and Depression  GI/Hepatic/Renal/Endo    (+) GERD    Musculoskeletal     Abdominal    Substance History   (+) alcohol use     OB/GYN negative ob/gyn ROS         Other   arthritis,                 Anesthesia Plan    ASA 3     general     intravenous induction     Anesthetic plan, risks, benefits, and alternatives have been provided, discussed and informed consent has been obtained with: patient.    CODE STATUS:          no

## 2024-09-05 NOTE — ANESTHESIA PROCEDURE NOTES
Airway  Urgency: elective    Date/Time: 9/5/2024 1:26 PM  Airway not difficult    General Information and Staff    Patient location during procedure: OR  CRNA/CAA: Chris Azevedo, DANIELA    Indications and Patient Condition  Indications for airway management: airway protection    Preoxygenated: yes  MILS maintained throughout  Mask difficulty assessment: 1 - vent by mask    Final Airway Details  Final airway type: endotracheal airway      Successful airway: ETT  Cuffed: yes   Successful intubation technique: direct laryngoscopy  Facilitating devices/methods: intubating stylet  Endotracheal tube insertion site: oral  Blade: Stephy  Blade size: 3  ETT size (mm): 7.0  Cormack-Lehane Classification: grade I - full view of glottis  Placement verified by: chest auscultation and capnometry   Measured from: gums  ETT/EBT to gums (cm): 21  Number of attempts at approach: 1  Assessment: lips, teeth, and gum same as pre-op and atraumatic intubation

## 2024-09-05 NOTE — DISCHARGE INSTRUCTIONS
Scopolamine Patch  This patch has been applied to the skin behind one of your ears.  It may stay in place up to 24 hours. You may remove it at any time after your surgery; however, it should be removed after you are up and walking around the next day.  This medicine reduces stomach upset. Side effects may include: dry mouth, dizziness, sleepiness, constipation, or upset stomach.  An allergy would show up as: a rash, itching, wheezing or shortness of breath.  Follow these instructions:  Do not drink alcohol, drive or operate machinery while taking this medicine.  Wear only 1 patch at a time. You can leave the patch on for up to 24 hours.  When you remove the patch, fold it in half with the sticky sides together and throw it away. Wash your hands and the area under the patch.  Do not touch your eye with your hand if it has touched the patch.  Wash your hands well before and after touching the patch.  Sit or stand slowly to avoid dizziness.  Call your doctor if you have:  Any sign of allergy  No relief  Trouble passing urine  Any new or severe symptoms

## 2024-09-06 NOTE — ANESTHESIA POSTPROCEDURE EVALUATION
Patient: Victoria H Sturgeon    Procedure Summary       Date: 09/05/24 Room / Location: [facility] OR  / [facility] MAIN OR    Anesthesia Start: 1317 Anesthesia Stop: 1502    Procedure: TOTAL LAPAROSCOPIC HYSTERECTOMY BILATERAL SALPINGO OOPHORECTOMY (Bilateral: Abdomen) Diagnosis:       Complex endometrial hyperplasia      (Complex endometrial hyperplasia [N85.01])    Surgeons: Zeynep Phan MD Provider: Elizabeth Pennington MD    Anesthesia Type: general ASA Status: 3            Anesthesia Type: general    Vitals  Vitals Value Taken Time   /76 09/05/24 1745   Temp 36.5 °C (97.7 °F) 09/05/24 1455   Pulse 83 09/05/24 1745   Resp 16 09/05/24 1745   SpO2 93 % 09/05/24 1745           Anesthesia Post Evaluation

## 2024-09-09 LAB
CYTO UR: NORMAL
LAB AP CASE REPORT: NORMAL
LAB AP CLINICAL INFORMATION: NORMAL
LAB AP INTRADEPARTMENTAL CONSULT: NORMAL
LAB AP SPECIAL STAINS: NORMAL
PATH REPORT.FINAL DX SPEC: NORMAL
PATH REPORT.GROSS SPEC: NORMAL

## 2024-09-19 ENCOUNTER — OFFICE VISIT (OUTPATIENT)
Dept: OBSTETRICS AND GYNECOLOGY | Age: 69
End: 2024-09-19
Payer: MEDICARE

## 2024-09-19 VITALS
HEIGHT: 66 IN | WEIGHT: 161.2 LBS | BODY MASS INDEX: 25.91 KG/M2 | SYSTOLIC BLOOD PRESSURE: 110 MMHG | DIASTOLIC BLOOD PRESSURE: 68 MMHG

## 2024-09-19 DIAGNOSIS — Z90.710 S/P LAPAROSCOPIC HYSTERECTOMY: Primary | ICD-10-CM

## 2024-10-17 ENCOUNTER — OFFICE VISIT (OUTPATIENT)
Dept: OBSTETRICS AND GYNECOLOGY | Age: 69
End: 2024-10-17
Payer: MEDICARE

## 2024-10-17 VITALS
SYSTOLIC BLOOD PRESSURE: 116 MMHG | HEIGHT: 66 IN | BODY MASS INDEX: 25.07 KG/M2 | WEIGHT: 156 LBS | DIASTOLIC BLOOD PRESSURE: 74 MMHG

## 2024-10-17 DIAGNOSIS — Z90.710 S/P LAPAROSCOPIC HYSTERECTOMY: ICD-10-CM

## 2024-10-17 DIAGNOSIS — M81.0 AGE-RELATED OSTEOPOROSIS WITHOUT CURRENT PATHOLOGICAL FRACTURE: Primary | ICD-10-CM

## 2024-10-17 DIAGNOSIS — E28.39 MENOPAUSE OVARIAN FAILURE: ICD-10-CM

## 2024-10-17 RX ORDER — ESTRADIOL 0.04 MG/D
1 PATCH, EXTENDED RELEASE TRANSDERMAL 2 TIMES WEEKLY
Qty: 24 PATCH | Refills: 3 | Status: SHIPPED | OUTPATIENT
Start: 2024-10-17 | End: 2025-10-17

## 2024-10-17 RX ORDER — ESTRADIOL 0.1 MG/G
1 CREAM VAGINAL NIGHTLY
Qty: 42.5 G | Refills: 12 | Status: SHIPPED | OUTPATIENT
Start: 2024-10-17

## 2024-10-17 NOTE — PROGRESS NOTES
"Subjective     Chief Complaint   Patient presents with    Post-op Follow-up     6 week post op TLH, bilateral salpingo oophorectomy        Victoria H Sturgeon is a 69 y.o.  whose LMP is No LMP recorded. Patient is postmenopausal. presents for 6-week postop visit from hysterectomy.  She has been doing well, occasionally has a tiny bit of brown blood but no bright red bleeding.  No abdominal pain.  She has been slowly increasing her activity  She does complain of some hot flashes at times.    The following portions of the patient's history were reviewed and updated as appropriate:vital signs, allergies, current medications, past medical history, past social history, past surgical history, and problem list      Objective      /74   Ht 167.6 cm (66\")   Wt 70.8 kg (156 lb)   BMI 25.18 kg/m²     Physical Exam  Well, no distress  Regular, nonlabored breathing  Abdomen is soft, nontender, laparoscopic sites are well-healed  Perineum inspected, the perineum has healed well but is a bit raw due to atrophy, the vagina is atrophic but normal-appearing, the vaginal cuff appears well-approximated      Assessment & Plan     Diagnoses and all orders for this visit:    1. Age-related osteoporosis without current pathological fracture (Primary)  -     estradiol (VIVELLE-DOT) 0.0375 MG/24HR patch; Place 1 patch on the skin as directed by provider 2 (Two) Times a Week.  Dispense: 24 patch; Refill: 3    2. Menopause ovarian failure  -     estradiol (VIVELLE-DOT) 0.0375 MG/24HR patch; Place 1 patch on the skin as directed by provider 2 (Two) Times a Week.  Dispense: 24 patch; Refill: 3    3. S/P laparoscopic hysterectomy    Other orders  -     estradiol (ESTRACE VAGINAL) 0.1 MG/GM vaginal cream; Insert 1 g into the vagina Every Night. For one month then twice weekly thereafter.  Dispense: 42.5 g; Refill: 12      We discussed the risks and benefits of continuing on hormone replacement at this time.  She does have some hot " flashes and also osteoporosis that would benefit from treatment with hormone replacement.  She is low risk for MI, she has had a calcium score that was 0.  Plan to start a low-dose of estradiol as her risk for endometrial hyperplasia or cancer is now mitigated with a hysterectomy    She needs some vaginal estrogen as well for the perineum and vaginal atrophy    Follow-up in 6 months    Zeynep Phan MD  10/17/2024

## 2025-05-12 ENCOUNTER — TELEPHONE (OUTPATIENT)
Dept: OBSTETRICS AND GYNECOLOGY | Age: 70
End: 2025-05-12

## 2025-05-12 NOTE — TELEPHONE ENCOUNTER
"Caller: Sturgeon, Victoria H \"NAWAF\"    Relationship:  Self    Best call back number: [phone]    PATIENT CALLED REQUESTING TO CANCEL SAME DAY APPT.    Did the patient call AFTER the start time of their scheduled appointment?  []YES  [x]NO    Was the patient's appointment rescheduled? [x]YES  []NO DESTINI TO 5/22    Any additional information: SICK     "

## (undated) DEVICE — ENDOPATH PNEUMONEEDLE INSUFFLATION NEEDLES WITH LUER LOCK CONNECTORS 120MM: Brand: ENDOPATH

## (undated) DEVICE — ANTIBACTERIAL UNDYED BRAIDED (POLYGLACTIN 910), SYNTHETIC ABSORBABLE SURGICAL SUTURE: Brand: COATED VICRYL

## (undated) DEVICE — SUTURING DEVICE: Brand: ENDO STITCH

## (undated) DEVICE — IRRIGATOR BULB ASEPTO 60CC STRL

## (undated) DEVICE — MANIP UTER RUMI 2 KOH EFFICIENT SS CP 3CM

## (undated) DEVICE — APPL HEMOS FOR DELIVERY FLOSEAL

## (undated) DEVICE — APPL CHLORAPREP HI/LITE 26ML ORNG

## (undated) DEVICE — TROCAR: Brand: KII SLEEVE

## (undated) DEVICE — TOTAL TRAY, 16FR 10ML SIL FOLEY, URN: Brand: MEDLINE

## (undated) DEVICE — HARMONIC 700 SHEARS, ADVANCED HEMOSTASIS: Brand: HARMONIC

## (undated) DEVICE — TROCAR: Brand: KII OPTICAL ACCESS SYSTEM

## (undated) DEVICE — LAPAROVUE VISIBILITY SYSTEM LAPAROSCOPIC SOLUTIONS: Brand: LAPAROVUE

## (undated) DEVICE — ANTIBACTERIAL UNDYED BRAIDED (POLYGLACTIN 910), SYNTHETIC ABSORBABLE SUTURE: Brand: COATED VICRYL

## (undated) DEVICE — GLV SURG BIOGEL LTX PF 6 1/2

## (undated) DEVICE — ENDOPATH XCEL BLADELESS TROCARS WITH STABILITY SLEEVES: Brand: ENDOPATH XCEL

## (undated) DEVICE — ENDOCUT SCISSOR TIP, DISPOSABLE: Brand: RENEW

## (undated) DEVICE — SOL IRR NACL 0.9PCT 3000ML

## (undated) DEVICE — STRIP,CLOSURE,WOUND,MEDI-STRIP,1/2X4: Brand: MEDLINE

## (undated) DEVICE — DEV SUT GRSPR CLOSUR 15CM 14G

## (undated) DEVICE — LAPAROSCOPIC SMOKE FILTRATION SYSTEM: Brand: PALL LAPAROSHIELD® PLUS LAPAROSCOPIC SMOKE FILTRATION SYSTEM

## (undated) DEVICE — SUT MNCRYL PLS ANTIB UD 4/0 PS2 18IN

## (undated) DEVICE — MANIP UTER RUMI 2 KOH EFFICIENT SS CP 3.5CM

## (undated) DEVICE — STRAP STIRUP WO/ RNG

## (undated) DEVICE — SYRINGE, LUER LOCK, 60ML: Brand: MEDLINE

## (undated) DEVICE — LOU D & C HYSTEROSCOPY: Brand: MEDLINE INDUSTRIES, INC.

## (undated) DEVICE — LOU GYN LAPAROSCOPY: Brand: MEDLINE INDUSTRIES, INC.

## (undated) DEVICE — MANIP UTER RUMI TP 5.1MM 6CM LAV

## (undated) DEVICE — LAPAROSCOPIC DISSECTOR: Brand: DEROYAL

## (undated) DEVICE — SYR LUERLOK 5CC

## (undated) DEVICE — DEV TISS REMOV MYOSURE REACH

## (undated) DEVICE — 3M™ STERI-DRAPE™ INSTRUMENT POUCH 1018L: Brand: STERI-DRAPE™

## (undated) DEVICE — COVER,MAYO STAND,STERILE: Brand: MEDLINE

## (undated) DEVICE — SEAL HYSTERSCOPE/OUTFLOW CHANNEL MYOSURE

## (undated) DEVICE — DRAPE,UNDERBUTTOCKS,PCH,STERILE: Brand: MEDLINE

## (undated) DEVICE — SOL NACL 0.9PCT 1000ML